# Patient Record
Sex: FEMALE | Race: BLACK OR AFRICAN AMERICAN | Employment: OTHER | URBAN - METROPOLITAN AREA
[De-identification: names, ages, dates, MRNs, and addresses within clinical notes are randomized per-mention and may not be internally consistent; named-entity substitution may affect disease eponyms.]

---

## 2023-02-03 ENCOUNTER — APPOINTMENT (OUTPATIENT)
Dept: CT IMAGING | Age: 88
DRG: 064 | End: 2023-02-03
Attending: STUDENT IN AN ORGANIZED HEALTH CARE EDUCATION/TRAINING PROGRAM
Payer: MEDICARE

## 2023-02-03 ENCOUNTER — APPOINTMENT (OUTPATIENT)
Dept: MRI IMAGING | Age: 88
DRG: 064 | End: 2023-02-03
Attending: STUDENT IN AN ORGANIZED HEALTH CARE EDUCATION/TRAINING PROGRAM
Payer: MEDICARE

## 2023-02-03 ENCOUNTER — APPOINTMENT (OUTPATIENT)
Dept: GENERAL RADIOLOGY | Age: 88
DRG: 064 | End: 2023-02-03
Attending: STUDENT IN AN ORGANIZED HEALTH CARE EDUCATION/TRAINING PROGRAM
Payer: MEDICARE

## 2023-02-03 ENCOUNTER — HOSPITAL ENCOUNTER (INPATIENT)
Age: 88
LOS: 10 days | Discharge: NURSING, REHAB OR LONG-TERM CARE | DRG: 064 | End: 2023-02-13
Attending: STUDENT IN AN ORGANIZED HEALTH CARE EDUCATION/TRAINING PROGRAM | Admitting: STUDENT IN AN ORGANIZED HEALTH CARE EDUCATION/TRAINING PROGRAM
Payer: MEDICARE

## 2023-02-03 DIAGNOSIS — I67.9 CEREBRAL VASCULAR DISEASE: ICD-10-CM

## 2023-02-03 DIAGNOSIS — R29.90 STROKE-LIKE EPISODE: ICD-10-CM

## 2023-02-03 DIAGNOSIS — G93.40 ENCEPHALOPATHY: Primary | ICD-10-CM

## 2023-02-03 LAB
ALBUMIN SERPL-MCNC: 2.7 G/DL (ref 3.5–5)
ALBUMIN/GLOB SERPL: 0.6 (ref 1.1–2.2)
ALP SERPL-CCNC: 90 U/L (ref 45–117)
ALT SERPL-CCNC: 19 U/L (ref 12–78)
AMMONIA PLAS-SCNC: 70 UMOL/L
ANION GAP BLD CALC-SCNC: 10 (ref 10–20)
ANION GAP SERPL CALC-SCNC: 4 MMOL/L (ref 5–15)
APPEARANCE UR: CLEAR
AST SERPL-CCNC: 17 U/L (ref 15–37)
ATRIAL RATE: 98 BPM
B PERT DNA SPEC QL NAA+PROBE: NOT DETECTED
BACTERIA URNS QL MICRO: NEGATIVE /HPF
BASE EXCESS BLD CALC-SCNC: 1.5 MMOL/L
BASOPHILS # BLD: 0 K/UL (ref 0–0.1)
BASOPHILS NFR BLD: 1 % (ref 0–1)
BILIRUB SERPL-MCNC: 0.3 MG/DL (ref 0.2–1)
BILIRUB UR QL: NEGATIVE
BORDETELLA PARAPERTUSSIS PCR, BORPAR: NOT DETECTED
BUN SERPL-MCNC: 26 MG/DL (ref 6–20)
BUN/CREAT SERPL: 29 (ref 12–20)
C PNEUM DNA SPEC QL NAA+PROBE: NOT DETECTED
CA-I BLD-MCNC: 1.28 MMOL/L (ref 1.12–1.32)
CALCIUM SERPL-MCNC: 9.3 MG/DL (ref 8.5–10.1)
CALCULATED R AXIS, ECG10: -167 DEGREES
CALCULATED T AXIS, ECG11: 23 DEGREES
CHLORIDE BLD-SCNC: 110 MMOL/L (ref 100–108)
CHLORIDE SERPL-SCNC: 109 MMOL/L (ref 97–108)
CO2 BLD-SCNC: 27 MMOL/L (ref 19–24)
CO2 SERPL-SCNC: 28 MMOL/L (ref 21–32)
COLOR UR: ABNORMAL
COMMENT, HOLDF: NORMAL
CREAT SERPL-MCNC: 0.91 MG/DL (ref 0.55–1.02)
CREAT UR-MCNC: 1 MG/DL (ref 0.6–1.3)
DIAGNOSIS, 93000: NORMAL
DIFFERENTIAL METHOD BLD: ABNORMAL
EOSINOPHIL # BLD: 0.1 K/UL (ref 0–0.4)
EOSINOPHIL NFR BLD: 2 % (ref 0–7)
EPITH CASTS URNS QL MICRO: ABNORMAL /LPF
ERYTHROCYTE [DISTWIDTH] IN BLOOD BY AUTOMATED COUNT: 11.6 % (ref 11.5–14.5)
FLUAV AG NPH QL IA: NEGATIVE
FLUAV SUBTYP SPEC NAA+PROBE: NOT DETECTED
FLUBV AG NOSE QL IA: NEGATIVE
FLUBV RNA SPEC QL NAA+PROBE: NOT DETECTED
GLOBULIN SER CALC-MCNC: 4.4 G/DL (ref 2–4)
GLUCOSE BLD STRIP.AUTO-MCNC: 103 MG/DL (ref 74–106)
GLUCOSE BLD STRIP.AUTO-MCNC: 128 MG/DL (ref 65–117)
GLUCOSE BLD STRIP.AUTO-MCNC: 71 MG/DL (ref 65–117)
GLUCOSE BLD STRIP.AUTO-MCNC: 79 MG/DL (ref 65–117)
GLUCOSE BLD STRIP.AUTO-MCNC: 85 MG/DL (ref 65–117)
GLUCOSE SERPL-MCNC: 100 MG/DL (ref 65–100)
GLUCOSE UR STRIP.AUTO-MCNC: NEGATIVE MG/DL
HADV DNA SPEC QL NAA+PROBE: NOT DETECTED
HCO3 BLDA-SCNC: 28 MMOL/L
HCOV 229E RNA SPEC QL NAA+PROBE: NOT DETECTED
HCOV HKU1 RNA SPEC QL NAA+PROBE: NOT DETECTED
HCOV NL63 RNA SPEC QL NAA+PROBE: NOT DETECTED
HCOV OC43 RNA SPEC QL NAA+PROBE: NOT DETECTED
HCT VFR BLD AUTO: 37.5 % (ref 35–47)
HGB BLD-MCNC: 12.8 G/DL (ref 11.5–16)
HGB UR QL STRIP: NEGATIVE
HMPV RNA SPEC QL NAA+PROBE: NOT DETECTED
HPIV1 RNA SPEC QL NAA+PROBE: NOT DETECTED
HPIV2 RNA SPEC QL NAA+PROBE: NOT DETECTED
HPIV3 RNA SPEC QL NAA+PROBE: NOT DETECTED
HPIV4 RNA SPEC QL NAA+PROBE: NOT DETECTED
HYALINE CASTS URNS QL MICRO: ABNORMAL /LPF (ref 0–2)
IMM GRANULOCYTES # BLD AUTO: 0 K/UL (ref 0–0.04)
IMM GRANULOCYTES NFR BLD AUTO: 1 % (ref 0–0.5)
INR PPP: 1.5 (ref 0.9–1.1)
KETONES UR QL STRIP.AUTO: 15 MG/DL
LACTATE BLD-SCNC: 0.84 MMOL/L (ref 0.4–2)
LEUKOCYTE ESTERASE UR QL STRIP.AUTO: NEGATIVE
LYMPHOCYTES # BLD: 0.9 K/UL (ref 0.8–3.5)
LYMPHOCYTES NFR BLD: 18 % (ref 12–49)
M PNEUMO DNA SPEC QL NAA+PROBE: NOT DETECTED
MCH RBC QN AUTO: 33.2 PG (ref 26–34)
MCHC RBC AUTO-ENTMCNC: 34.1 G/DL (ref 30–36.5)
MCV RBC AUTO: 97.2 FL (ref 80–99)
MONOCYTES # BLD: 0.5 K/UL (ref 0–1)
MONOCYTES NFR BLD: 9 % (ref 5–13)
NEUTS SEG # BLD: 3.6 K/UL (ref 1.8–8)
NEUTS SEG NFR BLD: 69 % (ref 32–75)
NITRITE UR QL STRIP.AUTO: NEGATIVE
NRBC # BLD: 0 K/UL (ref 0–0.01)
NRBC BLD-RTO: 0 PER 100 WBC
PCO2 BLDV: 47.5 MMHG (ref 41–51)
PH BLDV: 7.37 (ref 7.32–7.42)
PH UR STRIP: 5.5 (ref 5–8)
PLATELET # BLD AUTO: 449 K/UL (ref 150–400)
PMV BLD AUTO: 9.6 FL (ref 8.9–12.9)
PO2 BLDV: 23 MMHG (ref 25–40)
POTASSIUM BLD-SCNC: 3.6 MMOL/L (ref 3.5–5.5)
POTASSIUM SERPL-SCNC: 3.5 MMOL/L (ref 3.5–5.1)
PROT SERPL-MCNC: 7.1 G/DL (ref 6.4–8.2)
PROT UR STRIP-MCNC: ABNORMAL MG/DL
PROTHROMBIN TIME: 15.2 SEC (ref 9–11.1)
Q-T INTERVAL, ECG07: 386 MS
QRS DURATION, ECG06: 108 MS
QTC CALCULATION (BEZET), ECG08: 495 MS
RBC # BLD AUTO: 3.86 M/UL (ref 3.8–5.2)
RBC #/AREA URNS HPF: ABNORMAL /HPF (ref 0–5)
RSV RNA SPEC QL NAA+PROBE: NOT DETECTED
RV+EV RNA SPEC QL NAA+PROBE: DETECTED
SAMPLES BEING HELD,HOLD: NORMAL
SAO2 % BLD: 38 %
SARS-COV-2 RDRP RESP QL NAA+PROBE: NOT DETECTED
SARS-COV-2 RNA RESP QL NAA+PROBE: NOT DETECTED
SERVICE CMNT-IMP: ABNORMAL
SERVICE CMNT-IMP: NORMAL
SODIUM BLD-SCNC: 147 MMOL/L (ref 136–145)
SODIUM SERPL-SCNC: 141 MMOL/L (ref 136–145)
SOURCE, COVRS: NORMAL
SP GR UR REFRACTOMETRY: 1.01 (ref 1–1.03)
SPECIMEN SITE: ABNORMAL
TROPONIN I SERPL HS-MCNC: 62 NG/L (ref 0–51)
TROPONIN I SERPL HS-MCNC: 65 NG/L (ref 0–51)
TSH SERPL DL<=0.05 MIU/L-ACNC: 2.95 UIU/ML (ref 0.36–3.74)
UA: UC IF INDICATED,UAUC: ABNORMAL
UROBILINOGEN UR QL STRIP.AUTO: 1 EU/DL (ref 0.2–1)
VENTRICULAR RATE, ECG03: 99 BPM
WBC # BLD AUTO: 5.2 K/UL (ref 3.6–11)
WBC URNS QL MICRO: ABNORMAL /HPF (ref 0–4)

## 2023-02-03 PROCEDURE — 84484 ASSAY OF TROPONIN QUANT: CPT

## 2023-02-03 PROCEDURE — 82607 VITAMIN B-12: CPT

## 2023-02-03 PROCEDURE — 80053 COMPREHEN METABOLIC PANEL: CPT

## 2023-02-03 PROCEDURE — 70450 CT HEAD/BRAIN W/O DYE: CPT

## 2023-02-03 PROCEDURE — 74011250636 HC RX REV CODE- 250/636: Performed by: STUDENT IN AN ORGANIZED HEALTH CARE EDUCATION/TRAINING PROGRAM

## 2023-02-03 PROCEDURE — 93005 ELECTROCARDIOGRAM TRACING: CPT

## 2023-02-03 PROCEDURE — A9576 INJ PROHANCE MULTIPACK: HCPCS | Performed by: STUDENT IN AN ORGANIZED HEALTH CARE EDUCATION/TRAINING PROGRAM

## 2023-02-03 PROCEDURE — 87040 BLOOD CULTURE FOR BACTERIA: CPT

## 2023-02-03 PROCEDURE — 0202U NFCT DS 22 TRGT SARS-COV-2: CPT

## 2023-02-03 PROCEDURE — 82962 GLUCOSE BLOOD TEST: CPT

## 2023-02-03 PROCEDURE — 99223 1ST HOSP IP/OBS HIGH 75: CPT | Performed by: PSYCHIATRY & NEUROLOGY

## 2023-02-03 PROCEDURE — 85610 PROTHROMBIN TIME: CPT

## 2023-02-03 PROCEDURE — 36415 COLL VENOUS BLD VENIPUNCTURE: CPT

## 2023-02-03 PROCEDURE — 65270000046 HC RM TELEMETRY

## 2023-02-03 PROCEDURE — 85025 COMPLETE CBC W/AUTO DIFF WBC: CPT

## 2023-02-03 PROCEDURE — 0042T CT PERF W CBF: CPT

## 2023-02-03 PROCEDURE — 82140 ASSAY OF AMMONIA: CPT

## 2023-02-03 PROCEDURE — 70496 CT ANGIOGRAPHY HEAD: CPT

## 2023-02-03 PROCEDURE — 82947 ASSAY GLUCOSE BLOOD QUANT: CPT

## 2023-02-03 PROCEDURE — 71045 X-RAY EXAM CHEST 1 VIEW: CPT

## 2023-02-03 PROCEDURE — 74011000250 HC RX REV CODE- 250: Performed by: STUDENT IN AN ORGANIZED HEALTH CARE EDUCATION/TRAINING PROGRAM

## 2023-02-03 PROCEDURE — 70553 MRI BRAIN STEM W/O & W/DYE: CPT

## 2023-02-03 PROCEDURE — 81001 URINALYSIS AUTO W/SCOPE: CPT

## 2023-02-03 PROCEDURE — 87804 INFLUENZA ASSAY W/OPTIC: CPT

## 2023-02-03 PROCEDURE — 84443 ASSAY THYROID STIM HORMONE: CPT

## 2023-02-03 PROCEDURE — 74011250637 HC RX REV CODE- 250/637: Performed by: STUDENT IN AN ORGANIZED HEALTH CARE EDUCATION/TRAINING PROGRAM

## 2023-02-03 PROCEDURE — 74011000636 HC RX REV CODE- 636: Performed by: STUDENT IN AN ORGANIZED HEALTH CARE EDUCATION/TRAINING PROGRAM

## 2023-02-03 PROCEDURE — 87635 SARS-COV-2 COVID-19 AMP PRB: CPT

## 2023-02-03 PROCEDURE — 99285 EMERGENCY DEPT VISIT HI MDM: CPT

## 2023-02-03 RX ORDER — ACETAMINOPHEN 500 MG/1
500 CAPSULE, LIQUID FILLED ORAL
Status: DISCONTINUED | OUTPATIENT
Start: 2023-02-03 | End: 2023-02-03 | Stop reason: SDUPTHER

## 2023-02-03 RX ORDER — TIMOLOL MALEATE 5 MG/ML
1 SOLUTION/ DROPS OPHTHALMIC 2 TIMES DAILY
COMMUNITY

## 2023-02-03 RX ORDER — IBUPROFEN 200 MG
4 TABLET ORAL AS NEEDED
Status: DISCONTINUED | OUTPATIENT
Start: 2023-02-03 | End: 2023-02-13 | Stop reason: HOSPADM

## 2023-02-03 RX ORDER — LOSARTAN POTASSIUM 100 MG/1
100 TABLET ORAL DAILY
COMMUNITY
End: 2023-02-13

## 2023-02-03 RX ORDER — THERA TABS 400 MCG
1 TAB ORAL DAILY
Status: DISCONTINUED | OUTPATIENT
Start: 2023-02-04 | End: 2023-02-13 | Stop reason: HOSPADM

## 2023-02-03 RX ORDER — MULTIVITAMIN
1 TABLET ORAL DAILY
COMMUNITY

## 2023-02-03 RX ORDER — ACETAMINOPHEN 650 MG/1
650 SUPPOSITORY RECTAL
Status: DISCONTINUED | OUTPATIENT
Start: 2023-02-03 | End: 2023-02-13 | Stop reason: HOSPADM

## 2023-02-03 RX ORDER — ADHESIVE BANDAGE
30 BANDAGE TOPICAL DAILY PRN
Status: DISCONTINUED | OUTPATIENT
Start: 2023-02-03 | End: 2023-02-13 | Stop reason: HOSPADM

## 2023-02-03 RX ORDER — TIMOLOL MALEATE 5 MG/ML
1 SOLUTION/ DROPS OPHTHALMIC 2 TIMES DAILY
Status: DISCONTINUED | OUTPATIENT
Start: 2023-02-03 | End: 2023-02-13 | Stop reason: HOSPADM

## 2023-02-03 RX ORDER — METOPROLOL TARTRATE 25 MG/1
25 TABLET, FILM COATED ORAL 2 TIMES DAILY
COMMUNITY
End: 2023-02-13

## 2023-02-03 RX ORDER — ACETAMINOPHEN 500 MG
500 TABLET ORAL
Status: DISCONTINUED | OUTPATIENT
Start: 2023-02-03 | End: 2023-02-13 | Stop reason: HOSPADM

## 2023-02-03 RX ORDER — INSULIN LISPRO 100 [IU]/ML
INJECTION, SOLUTION INTRAVENOUS; SUBCUTANEOUS
Status: DISCONTINUED | OUTPATIENT
Start: 2023-02-03 | End: 2023-02-13 | Stop reason: HOSPADM

## 2023-02-03 RX ORDER — LATANOPROST 50 UG/ML
1 SOLUTION/ DROPS OPHTHALMIC
COMMUNITY

## 2023-02-03 RX ORDER — DEXTROMETHORPHAN HYDROBROMIDE, GUAIFENESIN 5; 100 MG/5ML; MG/5ML
650 LIQUID ORAL EVERY 8 HOURS
COMMUNITY

## 2023-02-03 RX ORDER — GUAIFENESIN 100 MG/5ML
81 LIQUID (ML) ORAL DAILY
Status: DISCONTINUED | OUTPATIENT
Start: 2023-02-04 | End: 2023-02-13 | Stop reason: HOSPADM

## 2023-02-03 RX ORDER — LOSARTAN POTASSIUM 100 MG/1
100 TABLET ORAL DAILY
Status: DISCONTINUED | OUTPATIENT
Start: 2023-02-04 | End: 2023-02-13 | Stop reason: HOSPADM

## 2023-02-03 RX ORDER — DEXTROSE MONOHYDRATE 100 MG/ML
0-250 INJECTION, SOLUTION INTRAVENOUS AS NEEDED
Status: DISCONTINUED | OUTPATIENT
Start: 2023-02-03 | End: 2023-02-13 | Stop reason: HOSPADM

## 2023-02-03 RX ORDER — AMIODARONE HYDROCHLORIDE 200 MG/1
200 TABLET ORAL 2 TIMES DAILY
Status: DISCONTINUED | OUTPATIENT
Start: 2023-02-03 | End: 2023-02-06

## 2023-02-03 RX ORDER — PHENOL/SODIUM PHENOLATE
20 AEROSOL, SPRAY (ML) MUCOUS MEMBRANE DAILY
COMMUNITY

## 2023-02-03 RX ORDER — SODIUM CHLORIDE 0.9 % (FLUSH) 0.9 %
5-10 SYRINGE (ML) INJECTION AS NEEDED
Status: DISCONTINUED | OUTPATIENT
Start: 2023-02-03 | End: 2023-02-13 | Stop reason: HOSPADM

## 2023-02-03 RX ORDER — METOPROLOL TARTRATE 25 MG/1
25 TABLET, FILM COATED ORAL 2 TIMES DAILY
Status: DISCONTINUED | OUTPATIENT
Start: 2023-02-03 | End: 2023-02-06

## 2023-02-03 RX ORDER — LEVOTHYROXINE SODIUM 75 UG/1
75 TABLET ORAL
Status: DISCONTINUED | OUTPATIENT
Start: 2023-02-04 | End: 2023-02-13 | Stop reason: HOSPADM

## 2023-02-03 RX ORDER — AMLODIPINE BESYLATE 5 MG/1
5 TABLET ORAL DAILY
COMMUNITY

## 2023-02-03 RX ORDER — GUAIFENESIN 600 MG/1
600 TABLET, EXTENDED RELEASE ORAL EVERY 12 HOURS
Status: DISCONTINUED | OUTPATIENT
Start: 2023-02-03 | End: 2023-02-13 | Stop reason: HOSPADM

## 2023-02-03 RX ORDER — BENZONATATE 100 MG/1
100 CAPSULE ORAL
Status: DISCONTINUED | OUTPATIENT
Start: 2023-02-03 | End: 2023-02-13 | Stop reason: HOSPADM

## 2023-02-03 RX ORDER — LEVOTHYROXINE SODIUM 75 UG/1
75 TABLET ORAL
COMMUNITY

## 2023-02-03 RX ORDER — ACETAMINOPHEN 325 MG/1
650 TABLET ORAL
Status: DISCONTINUED | OUTPATIENT
Start: 2023-02-03 | End: 2023-02-13 | Stop reason: HOSPADM

## 2023-02-03 RX ORDER — AMIODARONE HYDROCHLORIDE 200 MG/1
200 TABLET ORAL 2 TIMES DAILY
Status: ON HOLD | COMMUNITY
End: 2023-02-10 | Stop reason: SDUPTHER

## 2023-02-03 RX ORDER — LATANOPROST 50 UG/ML
1 SOLUTION/ DROPS OPHTHALMIC
Status: DISCONTINUED | OUTPATIENT
Start: 2023-02-03 | End: 2023-02-13 | Stop reason: HOSPADM

## 2023-02-03 RX ORDER — AMLODIPINE BESYLATE 5 MG/1
5 TABLET ORAL DAILY
Status: DISCONTINUED | OUTPATIENT
Start: 2023-02-04 | End: 2023-02-13 | Stop reason: HOSPADM

## 2023-02-03 RX ORDER — GUAIFENESIN 100 MG/5ML
81 LIQUID (ML) ORAL DAILY
COMMUNITY

## 2023-02-03 RX ORDER — PANTOPRAZOLE SODIUM 40 MG/1
40 TABLET, DELAYED RELEASE ORAL
Status: DISCONTINUED | OUTPATIENT
Start: 2023-02-04 | End: 2023-02-13 | Stop reason: HOSPADM

## 2023-02-03 RX ADMIN — GUAIFENESIN 600 MG: 600 TABLET, EXTENDED RELEASE ORAL at 22:36

## 2023-02-03 RX ADMIN — GADOTERIDOL 12 ML: 279.3 INJECTION, SOLUTION INTRAVENOUS at 19:51

## 2023-02-03 RX ADMIN — IOPAMIDOL 100 ML: 755 INJECTION, SOLUTION INTRAVENOUS at 10:59

## 2023-02-03 RX ADMIN — LATANOPROST 1 DROP: 50 SOLUTION OPHTHALMIC at 22:37

## 2023-02-03 NOTE — CONSULTS
Neurology Note    Patient ID:  Rabia Calles  [id number]  24 y.o.  6/14/1932      Date of Consultation:  February 3, 2023    Referring Physician: Dr. Adam Pearson    Reason for Consultation:  altered mental status      Assessment and Plan:    The patient is a pleasant 80-year-old female who presented to the hospital with altered mental status. Her examination does reveal a rather severe left-sided hemiparesis with cognitive slowing. Exact baseline is unclear at this time. Acute encephalopathy:    Differential includes extension of prior stroke, TIA, metabolic encephalopathy, systemic impact of infection. Patient does have risk factors for stroke including hypertension and atrial fibrillation  Continue home dosing of Xarelto and aspirin. Head CT with no acute abnormality  CTA with concern for possible ring-enhancing abnormality which may include a subacute stroke versus other structural abnormality  The patient should receive a urgent MRI with and without contrast.  Evaluate for underlying infectious etiology. COVID testing is pending  Attempt to a acquire testing results from recent hospitalization  May need to repeat echo, hemoglobin A1c and lipid panel. I will order other labs for possible causes of encephalopathy including thyroid, ammonia, and vitamin B12. Depending on clarification of mental status, the patient may need an EEG to ensure there is no seizure focus or subclinical seizures. Neurology will continue to follow closely in this complicated patient with ongoing neurological symptoms necessitating additional testing to be ordered and performed             Subjective:  I don't know       History of Present Illness:   Rabia Calles is a 80 y.o. female with a history of reportedly prior stroke, hypertension, dyslipidemia who was brought to the hospital from [address] [place] [address] and rehabilitation for reported altered mental status.   The medical history is from speaking with nursing at the bedside and medical records that are available for my review. It appears the patient had a prior stroke approximately 3 weeks ago. She was left with severe left-sided weakness and left-sided facial droop since that time. At the rehabilitation center, the patient did have a fall out of bed but reportedly did not hit her head. Since that time there has been an alteration in her mental status. She had become less responsive. She has been bedbound with dysarthria since her stroke. Prior to the stroke, she was living independently. There is no family available for me to talk to and find additional information. Pertinent labs upon admission include a white blood cell count of 5.2, platelets of 943, hemoglobin of 12.8. Her sodium was 141, INR 1.5, creatinine of 0.9. Her AST was 17 and ALT was 19. I was able to independently review the head CT from February 3, 2023. There was no acute abnormalities. There was atrophy and chronic microvascular ischemic changes. CTA of the head and neck revealed no large vessel flow-limiting stenosis. There was abnormal ringing Hanssen appearance of the right caudate and putamen which may represent subacute ischemia however other etiologies could not be excluded. Additional neuroimaging is recommended. Past Medical History:   Diagnosis Date    Atrial fibrillation ([address]     CVA (cerebral vascular accident) ([address]     GERD (gastroesophageal reflux disease)     Glaucoma     HTN (hypertension)     Hyperlipidemia         No past surgical history on file. Family History   Problem Relation Age of Onset    No Known Problems Mother     No Known Problems Father         Social History     Tobacco Use    Smoking status: Never    Smokeless tobacco: Never   Substance Use Topics    Alcohol use: Not Currently        No Known Allergies     Prior to Admission medications    Medication Sig Start Date End Date Taking?  Authorizing Provider   acetaminophen (Tylenol 8 Hour) 650 mg TbER Take 650 mg by mouth every eight (8) hours. Yes Provider, Historical   amiodarone (CORDARONE) 200 mg tablet Take 200 mg by mouth two (2) times a day. Yes Provider, Historical   aspirin 81 mg chewable tablet Take 81 mg by mouth daily. Yes Provider, Historical   latanoprost (XALATAN) 0.005 % ophthalmic solution Administer 1 Drop to both eyes nightly. Yes Provider, Historical   levothyroxine (SYNTHROID) 75 mcg tablet Take 75 mcg by mouth Daily (before breakfast). Yes Provider, Historical   losartan (COZAAR) 100 mg tablet Take 100 mg by mouth daily. Yes Provider, Historical   metoprolol tartrate (LOPRESSOR) 25 mg tablet Take 25 mg by mouth two (2) times a day. Yes Provider, Historical   multivitamin (ONE A DAY) tablet Take 1 Tablet by mouth daily. Yes Provider, Historical   amLODIPine (Norvasc) 5 mg tablet Take 5 mg by mouth daily. Yes Provider, Historical   Omeprazole delayed release (PRILOSEC D/R) 20 mg tablet Take 20 mg by mouth daily. Yes Provider, Historical   timolol (TIMOPTIC) 0.5 % ophthalmic solution 1 Drop two (2) times a day. Yes Provider, Historical   rivaroxaban (Xarelto) 20 mg tab tablet Take 20 mg by mouth every evening.    Yes Provider, Historical     Current Facility-Administered Medications   Medication Dose Route Frequency    sodium chloride (NS) flush 5-10 mL  5-10 mL IntraVENous PRN    [Held by provider] amiodarone (CORDARONE) tablet 200 mg  200 mg Oral BID    [START ON 2/4/2023] aspirin chewable tablet 81 mg  81 mg Oral DAILY    latanoprost (XALATAN) 0.005 % ophthalmic solution 1 Drop  1 Drop Both Eyes QHS    [START ON 2/4/2023] levothyroxine (SYNTHROID) tablet 75 mcg  75 mcg Oral ACB    [Held by provider] losartan (COZAAR) tablet 100 mg  100 mg Oral DAILY    [Held by provider] metoprolol tartrate (LOPRESSOR) tablet 25 mg  25 mg Oral BID    [Held by provider] amLODIPine (NORVASC) tablet 5 mg  5 mg Oral DAILY    timolol (TIMOPTIC) 0.5 % ophthalmic solution 1 Drop  1 Drop Both Eyes BID    [START ON 2/4/2023] pantoprazole (PROTONIX) tablet 40 mg  40 mg Oral ACB    [START ON 2/4/2023] rivaroxaban (XARELTO) tablet 15 mg  15 mg Oral DAILY    [START ON 2/4/2023] therapeutic multivitamin (THERAGRAN) tablet 1 Tablet  1 Tablet Oral DAILY    acetaminophen (TYLENOL) tablet 650 mg  650 mg Oral Q4H PRN    Or    acetaminophen (TYLENOL) solution 650 mg  650 mg Per NG tube Q4H PRN    Or    acetaminophen (TYLENOL) suppository 650 mg  650 mg Rectal Q4H PRN    magnesium hydroxide (MILK OF MAGNESIA) 400 mg/5 mL oral suspension 30 mL  30 mL Oral DAILY PRN    guaiFENesin ER (MUCINEX) tablet 600 mg  600 mg Oral Q12H    benzonatate (TESSALON) capsule 100 mg  100 mg Oral TID PRN    acetaminophen (TYLENOL) tablet 500 mg  500 mg Oral Q6H PRN       Review of Systems:      [x]Unable to obtain  ROS due to  [x]mental status change  []sedated   []intubated    Objective:     Visit Vitals  /73 (BP 1 Location: Right lower arm, BP Patient Position: Supine)   Pulse 70   Temp 97.9 °F (36.6 °C)   Resp 17   Ht 5' 8\" (1.727 m)   Wt 135 lb (61.2 kg)   SpO2 99%   BMI 20.53 kg/m²       Physical Exam:      General:  appears well nourished in no acute distress  Neck: no carotid bruits  Lungs: clear to auscultation  Heart:  no murmurs, regular rate  Lower extremity: peripheral pulses palpable and no edema  Skin: intact    Neurological exam:    The patient is awake and alert. She would tell me her name. She would follow simple one-step commands such as raise her right arm and wiggle her right toes. She did show me a thumb and 2 fingers. She does have decreased attention and concentration. Her speech was soft but comprehensible. Cranial nerves:   II-XII were tested    Perrrla  Fundoscopic examination attempted but difficult to visualize fundus.   Visual fields were full  Eomi, no evidence of nystagmus  Facial sensation:  normal and symmetric  Facial motor: Left facial droop  Hearing intact  SCM strength intact  Tongue: midline without fasciculations    Motor: Tone -decreased in the left upper and lower extremity    No evidence of fasciculations    Strength testing: The patient had a difficult time in comprehending motor testing. She does lift her right arm and right leg off the bed independently. She had no movement seen in her left arm or left leg. Sensory:  Upper extremity: intact to pp,  Lower extremity: intact to pp,   More detailed sensory testing was unable to be performed due to her mental status. Reflexes:    Right Left  Biceps  2 3  Triceps 2 3  Brachiorad. 2 3  Patella  2 3  Achilles 1 1    Plantar response:  extensor on the left      Cerebellar testing:  no tremor apparent  Gait: This was not assessed due to her severe weakness  Labs:     Lab Results   Component Value Date/Time    Sodium 141 02/03/2023 09:25 AM    Potassium 3.5 02/03/2023 09:25 AM    Chloride 109 (H) 02/03/2023 09:25 AM    Glucose 100 02/03/2023 09:25 AM    BUN 26 (H) 02/03/2023 09:25 AM    Creatinine 0.91 02/03/2023 09:25 AM    Calcium 9.3 02/03/2023 09:25 AM    WBC 5.2 02/03/2023 09:25 AM    HCT 37.5 02/03/2023 09:25 AM    HGB 12.8 02/03/2023 09:25 AM    PLATELET 869 (H) 28/78/8818 09:25 AM       Imaging:    Results from Hospital Encounter encounter on 12/17/10    MRI SPINE CERVICAL WITHOUT CONTRAST    Narrative      ICD Codes / Adm. Diagnosis: 723.1   / Lendel Mood  Examination:  MRI C SPINE  CON  - [id number] - Dec 17 2010 12:33PM  Accession No:  [id number]  Reason:  cervicalgia      REPORT:  INDICATION: Cervicalgia    Cervical spine MRI examination consists of sagittal STIR and T2 and T1 and  axial gradient-echo and T1-weighted sequences. STIR images demonstrate no pathologic high signal intensity foci in cervical  spine bone marrow. The cord is of normal size and signal intensity. There is no tonsillar  herniation. There is degenerative disc disease fairly markedly at C4-C5 and mild to  moderately at C3-C4.     There is bilateral Luschka joint spurring at C3-C4 and transverse disc  protrusion and grade 1 spondylolisthesis of C3 on C4 with mild to moderate  ventral thecal sac indentation at that level and mild to moderate left and  mild right foraminal compromise. There is bilateral Luschka joint spurring and a central disc extrusion at  C4-C5 with moderate ventral central thecal sac indentation and mild  bilateral foraminal compromise. There is transverse disc protrusion at C5-C6 with mild ventral thecal sac  indentation. IMPRESSION:  1. Spondylosis C3-C6. Interpreting/Reading Doctor: Charles Cabello ([id number])  Transcribed:  on 12/17/2010  Approved: Charles Cabello ([id number])  12/17/2010        Distribution:  Attending Doctor: Francisco Peterson. Kim Scheuermann      Results from Hospital Encounter encounter on 02/03/23    CT PERF W CBF    Narrative  EXAM:  CTA HEAD NECK W CONT, CT PERF W CBF    INDICATION:   ams    COMPARISON:  None. CONTRAST: 100 mL of Isovue-370    TECHNIQUE: Unenhanced  images were obtained to localize the volume for  acquisition. Multislice helical axial CT angiography was performed from the  aortic arch to the top of the head during uneventful rapid bolus intravenous  contrast administration. Coronal and sagittal reformations and 3D post  processing was performed. CT dose reduction was achieved through use of a  standardized protocol tailored for this examination and automatic exposure  control for dose modulation. FINDINGS:    DELAYED CONTRAST-ENHANCED HEAD CT:    The ventricles are slightly prominent, possibly related to ex vacuo dilatation  from generalized volume loss. There is no midline shift or mass effect. Relative  low density with ring enhancement of the right caudate and putamen with  surrounding increased low-density in the white matter. No extra-axial  collections. Gray-white differentiation is maintained. The basal cisterns are  clear.  The orbits, paranasal sinuses, and mastoid air cells are unremarkable. CTA NECK:    Great vessels: The aortic arch and great vessel origins are not completely  visualized. Right subclavian artery: Patent    Left subclavian artery: Patent    Right common carotid artery: Patent    Left common carotid artery: Patent    Cervical right internal carotid artery: Patent with no significant stenosis by  NASCET criteria. Cervical left internal carotid artery: Patent with no significant stenosis by  NASCET criteria. Right vertebral artery: Patent    Left vertebral artery: Patent    The lung apices are clear. The cervical soft tissues are unremarkable. No  suspicious osseous lesions. CTA HEAD:    Right cavernous internal carotid artery: Patent    Left cavernous internal carotid artery: Patent    Anterior cerebral arteries: Patent    Anterior communicating artery: Patent    Right middle cerebral artery: Patent    Left middle cerebral artery: Patent    Posterior communicating arteries: Patent    Posterior cerebral arteries: Patent    Basilar artery: Patent    Distal vertebral arteries: Patent    There is no intracranial aneurysm. The dural venous sinuses are patent. Impression  1. Abnormal ring-enhancing appearance of the right caudate and putamen,  possibly representing subacute ischemia though other etiologies are not  excluded. Further evaluation with contrast-enhanced MRI recommended. 2.  No evidence of large vessel occlusion or significant flow-limiting stenosis. 3.  Mild prominence of the ventricular system may be related to ex vacuo  dilatation. Superimposed communicating hydrocephalus can be considered. Complex decision making was necessary due to the patient's current medical condition and other medical co-morbidities.          Active Problems:    Stroke-like episode (2/3/2023)                   Signed By:  Nagi Morrison DO FAAN    February 3, 2023

## 2023-02-03 NOTE — Clinical Note
Status[de-identified] INPATIENT [101]   Type of Bed: Neuro/Stroke [9]   Cardiac Monitoring Required?: Yes   Inpatient Hospitalization Certified Necessary for the Following Reasons: 3.  Patient receiving treatment that can only be provided in an inpatient setting (further clarification in H&P documentation)   Admitting Diagnosis: Stroke-like episode [[id number]]   Admitting Physician: Alonzo Henriquez [[id number]]   Attending Physician: Alonzo Henriquez [[id number]]   Estimated Length of Stay: 3-4 Midnights   Discharge Plan[de-identified] Extended Care Facility (e.g. Adult Home, Nursing Home, etc.)

## 2023-02-03 NOTE — ED NOTES
Pt present to ER from [facility] via EMS w/ complaints of AMS. Facility staff found pt this morning and she was only responsive to pain. Assisted to position of comfort, call bell within reach.

## 2023-02-03 NOTE — PROGRESS NOTES

## 2023-02-03 NOTE — PROGRESS NOTES
Report given to [facility] according to report received from [employer] in ED using SBAR. Patient still needs MRI checklist. Attempted to obtain MRI Check list prior to patient arriving on unit but no one answered the contact numbers on chart and there was no voice mailbox. Attempted to call [facility] care x3 to obtain information but each time was put on hold and then cut off.

## 2023-02-03 NOTE — PROGRESS NOTES
Speech path  Patient is currently being evaluated by the neurologist and the nurse. She just arrived on the floor. No completed H and P yet. We will follow up tomorrow. Please complete [place] swallow screen.    Brii Manuel, SLP

## 2023-02-03 NOTE — H&P
Hospitalist Admission Note    NAME: Ruba Castro   :  1932   MRN:  [id number]     Date/Time:  2/3/2023 2:09 PM    Patient PCP: None  ______________________________________________________________________  Given the patient's current clinical presentation, I have a high level of concern for decompensation if discharged from the emergency department. Complex decision making was performed, which includes reviewing the patient's available past medical records, laboratory results, and x-ray films. My assessment of this patient's clinical condition and my plan of care is as follows. Assessment / Plan:  AMS-Encephalopathy  R/o stroke/TIA  Episode of AMS and unresponsive except to painful stimuli at SNF post CVA discharge approximately 3 weeks ago. CT with subacute stroke/ring enhancing lesion  MRI with previously identified ring enhancing lesion  UA okay  Plan  Admit to neuro  Cardiac monitoring  Stroke order set used  Neurology consulted, expertise appreciated  AM HbA1C and lipid panel  MRI brain  Permissive HTN-hold home antihypertensives  PRNs for BP >220/110  Continue home xarelto and ASA  Stoke nursing assessments  PT/OT/SLP    Cough  Flu and covid negative. CXR with no acute process. Deferred blood cultures as pt not demonstrating SIRS criteria. Acute respiratory viral infection with recent stroke contributing to AMS. PCR added with viral panel as patient currently living in healthcare group setting. Positive for rhinovirus and enterovirus. Mucinex    HTN  Afib  Hold amiodarone, norvasc, losartan, and metoprolol  Continue xarelto    DM  SSI  Follow up AM HbA1C    Glaucoma  Continue home eye drops    GERD  Continue home PPI    Hypothyroidism  Continue home synthroid    Code Status: Full  Surrogate Decision Maker: Damien Canales [phone]    DVT Prophylaxis: Lovenox  GI Prophylaxis: not indicated    Baseline: New baseline of bed bound with left sided hemiplegia  JACKY: ? Subjective:   CHIEF COMPLAINT: AMS    HISTORY OF PRESENT ILLNESS:     Rabia Calles is a 80 y.o.  female who presents with PMH of HTN, afib, CVA, DM, glaucoma, hypothyroidism, and GERD. Patient poor historian, alert and oriented to self and very nearly date with month, day, and \"2003\". Unaware of location. History collected from chart and son Gee Richards. Patient presenting from [facility] after episode of AMS this morning. Reportedly she fell from bed level yesterday and did not hit her head. Yesterday post fall she was fine at new baseline of bed bound with some dysarthria, however able to talk. This morning staff noted that she was altered by finding that she only responded to pain. Per son when he presented to ED, patient was back to her new baseline post CVA. Prior to stroke 3 weeks ago, she was living independently without a assisted walking device. She was driving and managing her ADLs independently with some short term memory difficulties. Since 1/2023 CVA she has been in rehab with left sided hemiplegia and left sided facial droop. Pt is noted to have some congestion and cough during interview. Rapid COVID and flu test negative. ED Course  Afebrile, HR 59 /68 RR 15 SpO2 98% RA  Labs remarkable for Plt of 449, INR of 1.5, Cl 109, BUN 26, Albumin 2.7. HS troponin 62>65  Rapid COVID and flu negative. Blood culture and UA collected. EKG with afib  CT head/CTA with 1. Abnormal ring-enhancing appearance of the right caudate and putamen, possibly representing subacute ischemia though other etiologies are not excluded. Further evaluation with contrast-enhanced MRI recommended. 2.  No evidence of large vessel occlusion or significant flow-limiting stenosis. 3.  Mild prominence of the ventricular system may be related to ex vacuo  dilatation. Superimposed communicating hydrocephalus can be considered.     We were asked to admit for work up and evaluation of the above problems. Past Medical History:   Diagnosis Date    Atrial fibrillation ([facility] [address]     CVA (cerebral vascular accident) ([facility] [address]     GERD (gastroesophageal reflux disease)     Glaucoma     HTN (hypertension)     Hyperlipidemia         No past surgical history on file. Social History     Tobacco Use    Smoking status: Never    Smokeless tobacco: Never   Substance Use Topics    Alcohol use: Not Currently        Family History   Problem Relation Age of Onset    No Known Problems Mother     No Known Problems Father      No Known Allergies     Prior to Admission medications    Medication Sig Start Date End Date Taking? Authorizing Provider   acetaminophen (Tylenol 8 Hour) 650 mg TbER Take 650 mg by mouth every eight (8) hours. Yes Provider, Historical   amiodarone (CORDARONE) 200 mg tablet Take 200 mg by mouth two (2) times a day. Yes Provider, Historical   aspirin 81 mg chewable tablet Take 81 mg by mouth daily. Yes Provider, Historical   latanoprost (XALATAN) 0.005 % ophthalmic solution Administer 1 Drop to both eyes nightly. Yes Provider, Historical   levothyroxine (SYNTHROID) 75 mcg tablet Take 75 mcg by mouth Daily (before breakfast). Yes Provider, Historical   losartan (COZAAR) 100 mg tablet Take 100 mg by mouth daily. Yes Provider, Historical   metoprolol tartrate (LOPRESSOR) 25 mg tablet Take 25 mg by mouth two (2) times a day. Yes Provider, Historical   multivitamin (ONE A DAY) tablet Take 1 Tablet by mouth daily. Yes Provider, Historical   amLODIPine (Norvasc) 5 mg tablet Take 5 mg by mouth daily. Yes Provider, Historical   Omeprazole delayed release (PRILOSEC D/R) 20 mg tablet Take 20 mg by mouth daily. Yes Provider, Historical   timolol (TIMOPTIC) 0.5 % ophthalmic solution 1 Drop two (2) times a day. Yes Provider, Historical   rivaroxaban (Xarelto) 20 mg tab tablet Take 20 mg by mouth every evening.    Yes Provider, Historical       REVIEW OF SYSTEMS:     I am not able to complete the review of systems because: The patient is intubated and sedated    The patient has altered mental status due to acute medical problems    The patient has baseline aphasia from prior stroke(s)   x The patient has baseline dementia and is not reliable historian    The patient is in acute medical distress and unable to provide information           Total of 12 systems reviewed as follows:       POSITIVE= underlined text  Negative = text not underlined  General:  fever, chills, sweats, generalized weakness, weight loss/gain,      loss of appetite   Eyes:    blurred vision, eye pain, loss of vision, double vision  ENT:    rhinorrhea, pharyngitis   Respiratory:   cough, sputum production, SOB, MARTIN, wheezing, pleuritic pain   Cardiology:   chest pain, palpitations, orthopnea, PND, edema, syncope   Gastrointestinal:  abdominal pain , N/V, diarrhea, dysphagia, constipation, bleeding   Genitourinary:  frequency, urgency, dysuria, hematuria, incontinence   Muskuloskeletal :  arthralgia, myalgia, back pain  Hematology:  easy bruising, nose or gum bleeding, lymphadenopathy   Dermatological: rash, ulceration, pruritis, color change / jaundice  Endocrine:   hot flashes or polydipsia   Neurological:  headache, dizziness, confusion, focal weakness, paresthesia,     Speech difficulties, memory loss, gait difficulty  Psychological: Feelings of anxiety, depression, agitation    Objective:   VITALS:    Visit Vitals  /80   Pulse 70   Temp 98.7 °F (37.1 °C)   Resp 17   Ht 5' 8\" (1.727 m)   Wt 61.2 kg (135 lb)   SpO2 99%   BMI 20.53 kg/m²       PHYSICAL EXAM:    General:    Alert, cooperative, no distress, appears stated age. HEENT: Atraumatic, anicteric sclerae, pink conjunctivae     No oral ulcers, mucosa moist, throat clear, dentition fair  Neck:  Supple, symmetrical,  thyroid: non tender  Lungs:   Clear to auscultation bilaterally. No Wheezing or Rhonchi. No rales.   Chest wall:  No tenderness  No Accessory muscle use.  Heart:   Regular  rhythm,  No  murmur   No edema  Abdomen:   Soft, non-tender. Not distended. Bowel sounds normal  Extremities: No cyanosis. No clubbing,      Skin turgor normal, Capillary refill normal, Radial dial pulse 2+  Skin:     Not pale. Not Jaundiced  No rashes   Psych:  Good insight. Not depressed. Not anxious or agitated. Neurologic: EOMs intact. Left sided facial droop/asymmetry. No aphasia, some delayed speech. Alert and oriented x2. Left side UE and LE with 0/5 strength. No palpable muscular contractions except for 1/5 from LLE dorsiflexion. _______________________________________________________________________  Care Plan discussed with:    Comments   Patient x    Family      RN     Care Manager                    Consultant:      _______________________________________________________________________  Expected  Disposition:   Home with Family    HH/PT/OT/RN    SNF/LTC x   MALLORIE    ________________________________________________________________________  TOTAL TIME:  72 Minutes    Critical Care Provided     Minutes non procedure based      Comments    x Reviewed previous records   >50% of visit spent in counseling and coordination of care x Discussion with patient and/or family and questions answered       ________________________________________________________________________  Signed: Macrina Martínez MD    Procedures: see electronic medical records for all procedures/Xrays and details which were not copied into this note but were reviewed prior to creation of Plan.     LAB DATA REVIEWED:    Recent Results (from the past 24 hour(s))   GLUCOSE, POC    Collection Time: 02/03/23  9:07 AM   Result Value Ref Range    Glucose (POC) 128 (H) 65 - 117 mg/dL    Performed by Arian Springer (KEVIN RN)    BLOOD GAS,CHEM8,LACTIC ACID POC    Collection Time: 02/03/23  9:14 AM   Result Value Ref Range    pH, venous (POC) 7.37 7.32 - 7.42      pCO2, venous (POC) 47.5 41 - 51 MMHG    pO2, venous (POC) 23 (L) 25 - 40 mmHg    BICARBONATE 28 mmol/L    Base excess (POC) 1.5 mmol/L    O2 SAT 38 %    Sodium,  (H) 136 - 145 MMOL/L    Potassium, POC 3.6 3.5 - 5.5 MMOL/L    Chloride,  (H) 100 - 108 MMOL/L    CO2, POC 27 (H) 19 - 24 MMOL/L    Anion gap, POC 10 10 - 20      Glucose,  74 - 106 MG/DL    Creatinine, POC 1.0 0.6 - 1.3 MG/DL    eGFR (POC) 54 (L) >60 ml/min/1.73m2    Calcium, ionized (POC) 1.28 1.12 - 1.32 mmol/L    Lactic Acid (POC) 0.84 0.40 - 2.00 mmol/L    Sample source VENOUS BLOOD     EKG, 12 LEAD, INITIAL    Collection Time: 02/03/23  9:14 AM   Result Value Ref Range    Ventricular Rate 99 BPM    Atrial Rate 98 BPM    QRS Duration 108 ms    Q-T Interval 386 ms    QTC Calculation (Bezet) 495 ms    Calculated R Axis -167 degrees    Calculated T Axis 23 degrees    Diagnosis       ** Poor data quality, interpretation may be adversely affected  ** Suspect arm lead reversal, interpretation assumes no reversal  Atrial fibrillation  Minimal voltage criteria for LVH, may be normal variant  Confirmed by Chrissie Fernández ([id number]) on 2/3/2023 11:29:40 AM     CBC WITH AUTOMATED DIFF    Collection Time: 02/03/23  9:25 AM   Result Value Ref Range    WBC 5.2 3.6 - 11.0 K/uL    RBC 3.86 3.80 - 5.20 M/uL    HGB 12.8 11.5 - 16.0 g/dL    HCT 37.5 35.0 - 47.0 %    MCV 97.2 80.0 - 99.0 FL    MCH 33.2 26.0 - 34.0 PG    MCHC 34.1 30.0 - 36.5 g/dL    RDW 11.6 11.5 - 14.5 %    PLATELET 006 (H) 121 - 400 K/uL    MPV 9.6 8.9 - 12.9 FL    NRBC 0.0 0  WBC    ABSOLUTE NRBC 0.00 0.00 - 0.01 K/uL    NEUTROPHILS 69 32 - 75 %    LYMPHOCYTES 18 12 - 49 %    MONOCYTES 9 5 - 13 %    EOSINOPHILS 2 0 - 7 %    BASOPHILS 1 0 - 1 %    IMMATURE GRANULOCYTES 1 (H) 0.0 - 0.5 %    ABS. NEUTROPHILS 3.6 1.8 - 8.0 K/UL    ABS. LYMPHOCYTES 0.9 0.8 - 3.5 K/UL    ABS. MONOCYTES 0.5 0.0 - 1.0 K/UL    ABS. EOSINOPHILS 0.1 0.0 - 0.4 K/UL    ABS. BASOPHILS 0.0 0.0 - 0.1 K/UL    ABS. IMM.  GRANS. 0.0 0.00 - 0.04 K/UL    DF AUTOMATED METABOLIC PANEL, COMPREHENSIVE    Collection Time: 02/03/23  9:25 AM   Result Value Ref Range    Sodium 141 136 - 145 mmol/L    Potassium 3.5 3.5 - 5.1 mmol/L    Chloride 109 (H) 97 - 108 mmol/L    CO2 28 21 - 32 mmol/L    Anion gap 4 (L) 5 - 15 mmol/L    Glucose 100 65 - 100 mg/dL    BUN 26 (H) 6 - 20 MG/DL    Creatinine 0.91 0.55 - 1.02 MG/DL    BUN/Creatinine ratio 29 (H) 12 - 20      eGFR 60 (L) >60 ml/min/1.73m2    Calcium 9.3 8.5 - 10.1 MG/DL    Bilirubin, total 0.3 0.2 - 1.0 MG/DL    ALT (SGPT) 19 12 - 78 U/L    AST (SGOT) 17 15 - 37 U/L    Alk. phosphatase 90 45 - 117 U/L    Protein, total 7.1 6.4 - 8.2 g/dL    Albumin 2.7 (L) 3.5 - 5.0 g/dL    Globulin 4.4 (H) 2.0 - 4.0 g/dL    A-G Ratio 0.6 (L) 1.1 - 2.2     SAMPLES BEING HELD    Collection Time: 02/03/23  9:25 AM   Result Value Ref Range    SAMPLES BEING HELD DRKGRN     COMMENT        Add-on orders for these samples will be processed based on acceptable specimen integrity and analyte stability, which may vary by analyte.    PROTHROMBIN TIME + INR    Collection Time: 02/03/23 10:01 AM   Result Value Ref Range    INR 1.5 (H) 0.9 - 1.1      Prothrombin time 15.2 (H) 9.0 - 11.1 sec   TROPONIN-HIGH SENSITIVITY    Collection Time: 02/03/23 10:01 AM   Result Value Ref Range    Troponin-High Sensitivity 62 (H) 0 - 51 ng/L   COVID-19 RAPID TEST    Collection Time: 02/03/23 10:01 AM   Result Value Ref Range    Specimen source Nasopharyngeal      COVID-19 rapid test Not detected NOTD     INFLUENZA A+B VIRAL AGS    Collection Time: 02/03/23 10:01 AM   Result Value Ref Range    Influenza A Antigen Negative NEG      Influenza B Antigen Negative NEG     TROPONIN-HIGH SENSITIVITY    Collection Time: 02/03/23 12:37 PM   Result Value Ref Range    Troponin-High Sensitivity 65 (H) 0 - 51 ng/L

## 2023-02-03 NOTE — PROGRESS NOTES
Pharmacy Automatic Direct Oral Anticoagulant Renal Dosing Protocol    Patient currently receiving Rivaroxaban 20 mg daily with an indication of ? Start date: PTA med      Labs:  Recent Labs     02/03/23  0925   CREA 0.91   HGB 12.8   *     Wt Readings from Last 1 Encounters:   02/03/23 61.2 kg (135 lb)        Creatinine Clearance: 39.7 (Actual Body Weight)    Impression/Plan:   Reduced dose to  Rivaroxaban 15mg/day while Est Crcl 15-50 ml/min per dosing protocol. Pharmacy will follow daily and adjust as appropriate.     Thank you,  Sy Tapia, [facility]

## 2023-02-03 NOTE — ED PROVIDER NOTES
[facility] [address] [facility]       Pt Name: Yulia Hays  MRN: [id number]  [place] 6/14/1932  Date of evaluation: 2/3/2023  Provider: Cesar Aguilar DO   PCP: None  Note Started: 5:20 PM 2/3/23     CHIEF COMPLAINT       Chief Complaint   Patient presents with    Unresponsive     AMS        HISTORY OF PRESENT ILLNESS: 1 or more elements      History From: EMS and Patient's Son  HPI Limitations : Altered Mental Status and Dementia     Gisell Colin is a 80 y.o. female who presents to the emergency department with chief complaint of altered mental status starting this morning. Patient's last known well was sometime last evening when she went to bed. There are no alternative caregivers at bedside and EMS is unclear of history. Per EMS, patient began not acting normally when she was evaluated this morning. They report that she is normally verbal but was nonverbal.  She does have a cough. Nursing facility denies any fever. History is limited due to patient's altered mental status and lack of caregivers at bedside. Nursing Notes were all reviewed and agreed with or any disagreements were addressed in the HPI. REVIEW OF SYSTEMS      Review of Systems   Unable to perform ROS: Mental status change      Positives and Pertinent negatives as per HPI. PAST HISTORY     Past Medical History:  Past Medical History:   Diagnosis Date    Atrial fibrillation ([address]     CVA (cerebral vascular accident) ([address]     GERD (gastroesophageal reflux disease)     Glaucoma     HTN (hypertension)     Hyperlipidemia        Past Surgical History:  No past surgical history on file.     Family History:  Family History   Problem Relation Age of Onset    No Known Problems Mother     No Known Problems Father        Social History:  Social History     Tobacco Use    Smoking status: Never    Smokeless tobacco: Never   Substance Use Topics    Alcohol use: Not Currently    Drug use: Never Allergies:  No Known Allergies    CURRENT MEDICATIONS      Current Discharge Medication List        CONTINUE these medications which have NOT CHANGED    Details   acetaminophen (Tylenol 8 Hour) 650 mg TbER Take 650 mg by mouth every eight (8) hours. amiodarone (CORDARONE) 200 mg tablet Take 200 mg by mouth two (2) times a day. aspirin 81 mg chewable tablet Take 81 mg by mouth daily. latanoprost (XALATAN) 0.005 % ophthalmic solution Administer 1 Drop to both eyes nightly. levothyroxine (SYNTHROID) 75 mcg tablet Take 75 mcg by mouth Daily (before breakfast). losartan (COZAAR) 100 mg tablet Take 100 mg by mouth daily. metoprolol tartrate (LOPRESSOR) 25 mg tablet Take 25 mg by mouth two (2) times a day. multivitamin (ONE A DAY) tablet Take 1 Tablet by mouth daily. amLODIPine (Norvasc) 5 mg tablet Take 5 mg by mouth daily. Omeprazole delayed release (PRILOSEC D/R) 20 mg tablet Take 20 mg by mouth daily. timolol (TIMOPTIC) 0.5 % ophthalmic solution 1 Drop two (2) times a day. rivaroxaban (Xarelto) 20 mg tab tablet Take 20 mg by mouth every evening. SCREENINGS               No data recorded         PHYSICAL EXAM      ED Triage Vitals [02/03/23 0908]   ED Encounter Vitals Group      /68      Pulse (Heart Rate) (!) 59      Resp Rate 15      Temp 98.7 °F (37.1 °C)      Temp src       O2 Sat (%) 98 %      Weight 135 lb      Height 5' 8\"        Physical Exam  Vitals and nursing note reviewed. Constitutional:       Appearance: Normal appearance. HENT:      Head: Normocephalic and atraumatic. Mouth/Throat:      Mouth: Mucous membranes are moist.   Eyes:      Conjunctiva/sclera: Conjunctivae normal.   Cardiovascular:      Rate and Rhythm: Tachycardia present. Rhythm irregular. Pulmonary:      Effort: Pulmonary effort is normal.      Breath sounds: Normal breath sounds. Abdominal:      General: Abdomen is flat.       Palpations: Abdomen is soft.   Musculoskeletal:      Right lower leg: No edema. Left lower leg: No edema. Skin:     General: Skin is warm. Capillary Refill: Capillary refill takes less than 2 seconds. Neurological:      Mental Status: She is alert. Comments: Opens eyes to painful stimuli, when asked if something hurts, patient reported her head. She is moving all extremities to painful stimuli, does appear weaker on the left arm and the left lower extremity.   She does appear to have a mild facial droop on the left side          DIAGNOSTIC RESULTS   LABS:     Recent Results (from the past 12 hour(s))   GLUCOSE, POC    Collection Time: 02/03/23  9:07 AM   Result Value Ref Range    Glucose (POC) 128 (H) 65 - 117 mg/dL    Performed by Jase Cole (KEIVN RN)    BLOOD GAS,CHEM8,LACTIC ACID POC    Collection Time: 02/03/23  9:14 AM   Result Value Ref Range    pH, venous (POC) 7.37 7.32 - 7.42      pCO2, venous (POC) 47.5 41 - 51 MMHG    pO2, venous (POC) 23 (L) 25 - 40 mmHg    BICARBONATE 28 mmol/L    Base excess (POC) 1.5 mmol/L    O2 SAT 38 %    Sodium,  (H) 136 - 145 MMOL/L    Potassium, POC 3.6 3.5 - 5.5 MMOL/L    Chloride,  (H) 100 - 108 MMOL/L    CO2, POC 27 (H) 19 - 24 MMOL/L    Anion gap, POC 10 10 - 20      Glucose,  74 - 106 MG/DL    Creatinine, POC 1.0 0.6 - 1.3 MG/DL    eGFR (POC) 54 (L) >60 ml/min/1.73m2    Calcium, ionized (POC) 1.28 1.12 - 1.32 mmol/L    Lactic Acid (POC) 0.84 0.40 - 2.00 mmol/L    Sample source VENOUS BLOOD     EKG, 12 LEAD, INITIAL    Collection Time: 02/03/23  9:14 AM   Result Value Ref Range    Ventricular Rate 99 BPM    Atrial Rate 98 BPM    QRS Duration 108 ms    Q-T Interval 386 ms    QTC Calculation (Bezet) 495 ms    Calculated R Axis -167 degrees    Calculated T Axis 23 degrees    Diagnosis       ** Poor data quality, interpretation may be adversely affected  ** Suspect arm lead reversal, interpretation assumes no reversal  Atrial fibrillation  Minimal voltage criteria for LVH, may be normal variant  Confirmed by Saintclair Danish ([id number]) on 2/3/2023 11:29:40 AM     CBC WITH AUTOMATED DIFF    Collection Time: 02/03/23  9:25 AM   Result Value Ref Range    WBC 5.2 3.6 - 11.0 K/uL    RBC 3.86 3.80 - 5.20 M/uL    HGB 12.8 11.5 - 16.0 g/dL    HCT 37.5 35.0 - 47.0 %    MCV 97.2 80.0 - 99.0 FL    MCH 33.2 26.0 - 34.0 PG    MCHC 34.1 30.0 - 36.5 g/dL    RDW 11.6 11.5 - 14.5 %    PLATELET 690 (H) 189 - 400 K/uL    MPV 9.6 8.9 - 12.9 FL    NRBC 0.0 0  WBC    ABSOLUTE NRBC 0.00 0.00 - 0.01 K/uL    NEUTROPHILS 69 32 - 75 %    LYMPHOCYTES 18 12 - 49 %    MONOCYTES 9 5 - 13 %    EOSINOPHILS 2 0 - 7 %    BASOPHILS 1 0 - 1 %    IMMATURE GRANULOCYTES 1 (H) 0.0 - 0.5 %    ABS. NEUTROPHILS 3.6 1.8 - 8.0 K/UL    ABS. LYMPHOCYTES 0.9 0.8 - 3.5 K/UL    ABS. MONOCYTES 0.5 0.0 - 1.0 K/UL    ABS. EOSINOPHILS 0.1 0.0 - 0.4 K/UL    ABS. BASOPHILS 0.0 0.0 - 0.1 K/UL    ABS. IMM. GRANS. 0.0 0.00 - 0.04 K/UL    DF AUTOMATED     METABOLIC PANEL, COMPREHENSIVE    Collection Time: 02/03/23  9:25 AM   Result Value Ref Range    Sodium 141 136 - 145 mmol/L    Potassium 3.5 3.5 - 5.1 mmol/L    Chloride 109 (H) 97 - 108 mmol/L    CO2 28 21 - 32 mmol/L    Anion gap 4 (L) 5 - 15 mmol/L    Glucose 100 65 - 100 mg/dL    BUN 26 (H) 6 - 20 MG/DL    Creatinine 0.91 0.55 - 1.02 MG/DL    BUN/Creatinine ratio 29 (H) 12 - 20      eGFR 60 (L) >60 ml/min/1.73m2    Calcium 9.3 8.5 - 10.1 MG/DL    Bilirubin, total 0.3 0.2 - 1.0 MG/DL    ALT (SGPT) 19 12 - 78 U/L    AST (SGOT) 17 15 - 37 U/L    Alk.  phosphatase 90 45 - 117 U/L    Protein, total 7.1 6.4 - 8.2 g/dL    Albumin 2.7 (L) 3.5 - 5.0 g/dL    Globulin 4.4 (H) 2.0 - 4.0 g/dL    A-G Ratio 0.6 (L) 1.1 - 2.2     SAMPLES BEING HELD    Collection Time: 02/03/23  9:25 AM   Result Value Ref Range    SAMPLES BEING HELD DRKGRN     COMMENT        Add-on orders for these samples will be processed based on acceptable specimen integrity and analyte stability, which may vary by analyte. PROTHROMBIN TIME + INR    Collection Time: 02/03/23 10:01 AM   Result Value Ref Range    INR 1.5 (H) 0.9 - 1.1      Prothrombin time 15.2 (H) 9.0 - 11.1 sec   TROPONIN-HIGH SENSITIVITY    Collection Time: 02/03/23 10:01 AM   Result Value Ref Range    Troponin-High Sensitivity 62 (H) 0 - 51 ng/L   COVID-19 RAPID TEST    Collection Time: 02/03/23 10:01 AM   Result Value Ref Range    Specimen source Nasopharyngeal      COVID-19 rapid test Not detected NOTD     INFLUENZA A+B VIRAL AGS    Collection Time: 02/03/23 10:01 AM   Result Value Ref Range    Influenza A Antigen Negative NEG      Influenza B Antigen Negative NEG     TROPONIN-HIGH SENSITIVITY    Collection Time: 02/03/23 12:37 PM   Result Value Ref Range    Troponin-High Sensitivity 65 (H) 0 - 51 ng/L   URINALYSIS W/ REFLEX CULTURE    Collection Time: 02/03/23  2:54 PM    Specimen: Urine   Result Value Ref Range    Color YELLOW/STRAW      Appearance CLEAR CLEAR      Specific gravity 1.010 1.003 - 1.030      pH (UA) 5.5 5.0 - 8.0      Protein TRACE (A) NEG mg/dL    Glucose Negative NEG mg/dL    Ketone 15 (A) NEG mg/dL    Bilirubin Negative NEG      Blood Negative NEG      Urobilinogen 1.0 0.2 - 1.0 EU/dL    Nitrites Negative NEG      Leukocyte Esterase Negative NEG      UA:UC IF INDICATED CULTURE NOT INDICATED BY UA RESULT CNI      WBC 0-4 0 - 4 /hpf    RBC 0-5 0 - 5 /hpf    Epithelial cells MODERATE (A) FEW /lpf    Bacteria Negative NEG /hpf    Hyaline cast 0-2 0 - 2 /lpf        EKG interpreted by me: A-fib     RADIOLOGY:  Non-plain film images such as CT, Ultrasound and MRI are read by the radiologist. Plain radiographic images are visualized and preliminarily interpreted by the ED Provider with the below findings:       Interpretation per the Radiologist below, if available at the time of this note:     CT HEAD WO CONT    Result Date: 2/3/2023  EXAM: CT HEAD WO CONT INDICATION: ams COMPARISON: None. CONTRAST: None.  TECHNIQUE: Unenhanced CT of the head was performed using 5 mm images. Brain and bone windows were generated. Coronal and sagittal reformats. CT dose reduction was achieved through use of a standardized protocol tailored for this examination and automatic exposure control for dose modulation. FINDINGS: Atrophy and white matter disease. No extra-axial fluid collection hemorrhage or shift. No mass. No acute changes. CT PERF W CBF    Result Date: 2/3/2023  EXAM:  CTA HEAD NECK W CONT, CT PERF W CBF INDICATION:   ams COMPARISON:  None. CONTRAST: 100 mL of Isovue-370 TECHNIQUE: Unenhanced  images were obtained to localize the volume for acquisition. Multislice helical axial CT angiography was performed from the aortic arch to the top of the head during uneventful rapid bolus intravenous contrast administration. Coronal and sagittal reformations and 3D post processing was performed. CT dose reduction was achieved through use of a standardized protocol tailored for this examination and automatic exposure control for dose modulation. FINDINGS: DELAYED CONTRAST-ENHANCED HEAD CT: The ventricles are slightly prominent, possibly related to ex vacuo dilatation from generalized volume loss. There is no midline shift or mass effect. Relative low density with ring enhancement of the right caudate and putamen with surrounding increased low-density in the white matter. No extra-axial collections. Gray-white differentiation is maintained. The basal cisterns are clear. The orbits, paranasal sinuses, and mastoid air cells are unremarkable. CTA NECK: Great vessels: The aortic arch and great vessel origins are not completely visualized. Right subclavian artery: Patent Left subclavian artery: Patent Right common carotid artery: Patent Left common carotid artery: Patent Cervical right internal carotid artery: Patent with no significant stenosis by NASCET criteria. Cervical left internal carotid artery: Patent with no significant stenosis by NASCET criteria.  Right vertebral artery: Patent Left vertebral artery: Patent The lung apices are clear. The cervical soft tissues are unremarkable. No suspicious osseous lesions. CTA HEAD: Right cavernous internal carotid artery: Patent Left cavernous internal carotid artery: Patent Anterior cerebral arteries: Patent Anterior communicating artery: Patent Right middle cerebral artery: Patent Left middle cerebral artery: Patent Posterior communicating arteries: Patent Posterior cerebral arteries: Patent Basilar artery: Patent Distal vertebral arteries: Patent There is no intracranial aneurysm. The dural venous sinuses are patent. 1.  Abnormal ring-enhancing appearance of the right caudate and putamen, possibly representing subacute ischemia though other etiologies are not excluded. Further evaluation with contrast-enhanced MRI recommended. 2.  No evidence of large vessel occlusion or significant flow-limiting stenosis. 3.  Mild prominence of the ventricular system may be related to ex vacuo dilatation. Superimposed communicating hydrocephalus can be considered. XR CHEST PORT    Result Date: 2/3/2023  EXAM:  XR CHEST PORT INDICATION: CVA COMPARISON: none TECHNIQUE: portable chest AP view FINDINGS: The cardiac silhouette is within normal limits. The pulmonary vasculature is within normal limits. Prior sternotomy The lungs and pleural spaces are clear. The visualized bones and upper abdomen are age-appropriate. No acute process on portable chest.     CTA HEAD NECK W CONT    Result Date: 2/3/2023  EXAM:  CTA HEAD NECK W CONT, CT PERF W CBF INDICATION:   ams COMPARISON:  None. CONTRAST: 100 mL of Isovue-370 TECHNIQUE: Unenhanced  images were obtained to localize the volume for acquisition. Multislice helical axial CT angiography was performed from the aortic arch to the top of the head during uneventful rapid bolus intravenous contrast administration. Coronal and sagittal reformations and 3D post processing was performed.  CT dose reduction was achieved through use of a standardized protocol tailored for this examination and automatic exposure control for dose modulation. FINDINGS: DELAYED CONTRAST-ENHANCED HEAD CT: The ventricles are slightly prominent, possibly related to ex vacuo dilatation from generalized volume loss. There is no midline shift or mass effect. Relative low density with ring enhancement of the right caudate and putamen with surrounding increased low-density in the white matter. No extra-axial collections. Gray-white differentiation is maintained. The basal cisterns are clear. The orbits, paranasal sinuses, and mastoid air cells are unremarkable. CTA NECK: Great vessels: The aortic arch and great vessel origins are not completely visualized. Right subclavian artery: Patent Left subclavian artery: Patent Right common carotid artery: Patent Left common carotid artery: Patent Cervical right internal carotid artery: Patent with no significant stenosis by NASCET criteria. Cervical left internal carotid artery: Patent with no significant stenosis by NASCET criteria. Right vertebral artery: Patent Left vertebral artery: Patent The lung apices are clear. The cervical soft tissues are unremarkable. No suspicious osseous lesions. CTA HEAD: Right cavernous internal carotid artery: Patent Left cavernous internal carotid artery: Patent Anterior cerebral arteries: Patent Anterior communicating artery: Patent Right middle cerebral artery: Patent Left middle cerebral artery: Patent Posterior communicating arteries: Patent Posterior cerebral arteries: Patent Basilar artery: Patent Distal vertebral arteries: Patent There is no intracranial aneurysm. The dural venous sinuses are patent. 1.  Abnormal ring-enhancing appearance of the right caudate and putamen, possibly representing subacute ischemia though other etiologies are not excluded. Further evaluation with contrast-enhanced MRI recommended.  2.  No evidence of large vessel occlusion or significant flow-limiting stenosis. 3.  Mild prominence of the ventricular system may be related to ex vacuo dilatation. Superimposed communicating hydrocephalus can be considered.        PROCEDURES   Unless otherwise noted below, none  Procedures     CRITICAL CARE TIME       EMERGENCY DEPARTMENT COURSE and DIFFERENTIAL DIAGNOSIS/MDM   Vitals:    Vitals:    02/03/23 1345 02/03/23 1526 02/03/23 1558 02/03/23 1600   BP: 125/80 (!) 141/68 125/73    Pulse: 70 64 (!) 53 70   Resp: 17 17     Temp:  98.2 °F (36.8 °C) 97.9 °F (36.6 °C)    SpO2: 99%  99%    Weight:       Height:            Patient was given the following medications:  Medications   sodium chloride (NS) flush 5-10 mL (has no administration in time range)   amiodarone (CORDARONE) tablet 200 mg ( Oral Automatically Held 2/17/23 1800)   aspirin chewable tablet 81 mg (has no administration in time range)   latanoprost (XALATAN) 0.005 % ophthalmic solution 1 Drop (has no administration in time range)   levothyroxine (SYNTHROID) tablet 75 mcg (has no administration in time range)   losartan (COZAAR) tablet 100 mg ( Oral Automatically Held 2/18/23 0900)   metoprolol tartrate (LOPRESSOR) tablet 25 mg ( Oral Automatically Held 2/17/23 1800)   amLODIPine (NORVASC) tablet 5 mg ( Oral Automatically Held 2/18/23 0900)   timolol (TIMOPTIC) 0.5 % ophthalmic solution 1 Drop (has no administration in time range)   pantoprazole (PROTONIX) tablet 40 mg (has no administration in time range)   rivaroxaban (XARELTO) tablet 15 mg (has no administration in time range)   therapeutic multivitamin (THERAGRAN) tablet 1 Tablet (has no administration in time range)   acetaminophen (TYLENOL) tablet 650 mg (has no administration in time range)     Or   acetaminophen (TYLENOL) solution 650 mg (has no administration in time range)     Or   acetaminophen (TYLENOL) suppository 650 mg (has no administration in time range)   magnesium hydroxide (MILK OF MAGNESIA) 400 mg/5 mL oral suspension 30 mL (has no administration in time range)   guaiFENesin ER (MUCINEX) tablet 600 mg (has no administration in time range)   benzonatate (TESSALON) capsule 100 mg (has no administration in time range)   acetaminophen (TYLENOL) tablet 500 mg (has no administration in time range)   iopamidoL (ISOVUE-370) 370 mg iodine /mL (76 %) injection 100 mL (100 mL IntraVENous Given 2/3/23 1059)       CONSULTS: (Who and What was discussed)  IP CONSULT TO NEUROLOGY    Chronic Conditions: On xarelto (likely afib), OA, HTN    Social Determinants affecting Dx or Tx: None    Records Reviewed (source and summary of external notes): Prior medical records and EMS run sheet  Revealing hx of OA, has not been seen here in years, no recent admissions    CC/HPI Summary, DDx, ED Course, and Reassessment: Patient presenting with chief complaint of altered mental status. On exam, patient is hemodynamically stable, slightly tachycardic but with history of A-fib likely as she is on Xarelto. She is not hypoxic, tachypneic or febrile. Not hypotensive. Differentials include infectious etiology, pneumonia versus UTI versus sepsis versus COVID versus flu. Consider electrolyte abnormality, anemia, ACS, arrhythmia. I have no previous documentation of stroke or history of left-sided deficits in the nursing home documentation or in our chart review, last known well is last night, she is more verbal in the emergency department but does have left-sided weakness on my exam.  Given the unclear time of onset, waxing and waning symptoms and unclear history as well as patient is on Xarelto will not be a stroke alert or TNK candidate at this time but will place orders for CT head, CTA and CT perfusion. I attempted to call patient's number listed in the chart but it appears to be her home phone and is not excepting any voicemails at this time. Will obtain labs, chest x-ray, UA, CT imaging as above. Disposition pending work-up.     Son is at bedside, he stated that patient was recently admitted to [facility] for stroke 3 weeks ago. She does have left-sided deficits from the stroke 3 weeks ago. He states she is in [facility] and rehab from the stroke. He states that she was normal last night, she was talking and interacting with him. He states that she is altered from her baseline. We will proceed with work-up and likely admission. ED Course as of 02/03/23 1720   Fri Feb 03, 2023   0936 EPOC with creatinine within normal limits [NM]   1148 Lab work largely benign, troponin mildly elevated at 62, patient remaining hemodynamically stable [NM]   1250 CT perfusion with abnormal ring-enhancing in the putamen, possibly consistent with subacute ischemia. This is of unclear etiology. I am unable to see her previous admission as it was at an OSH and she does not have records with her. She is not febrile and has no leukocytosis so my suspicion for infectious etiology is lower however certainly on ddx given ring enhancing lesion. I updated the patient's son regarding the findings. Will admit to the hospitalist for MRI and further work-up. [NM]      ED Course User Index  [NM] Henry Cade,        Disposition Considerations (Tests not done, Shared Decision Making, Pt Expectation of Test or Tx.):     FINAL IMPRESSION     1. Encephalopathy [G93.40 (ICD-10-CM)]    2. Stroke-like episode [R29.90 (ICD-10-CM)]    3. Cerebral vascular disease [I67.9 (ICD-10-CM)]          DISPOSITION/PLAN   Admitted    Admit Note: Pt is being admitted by Dr. Linda Scruggs . The results of their tests and reason(s) for their admission have been discussed with pt and/or available family. They convey agreement and understanding for the need to be admitted and for the admission diagnosis. (Please note that parts of this dictation were completed with voice recognition software.  Quite often unanticipated grammatical, syntax, homophones, and other interpretive errors are inadvertently transcribed by the computer software. Please disregards these errors.  Please excuse any errors that have escaped final proofreading.)    Sarah Acosta, DO

## 2023-02-04 PROBLEM — I69.954 HEMIPLEGIA OF LEFT NONDOMINANT SIDE AS LATE EFFECT OF CEREBROVASCULAR DISEASE (HCC): Status: ACTIVE | Noted: 2023-02-04

## 2023-02-04 PROBLEM — I69.359 HISTORY OF HEMORRHAGIC STROKE WITH RESIDUAL HEMIPLEGIA (HCC): Status: ACTIVE | Noted: 2023-02-04

## 2023-02-04 LAB
CHOLEST SERPL-MCNC: 164 MG/DL
EST. AVERAGE GLUCOSE BLD GHB EST-MCNC: 105 MG/DL
GLUCOSE BLD STRIP.AUTO-MCNC: 104 MG/DL (ref 65–117)
GLUCOSE BLD STRIP.AUTO-MCNC: 111 MG/DL (ref 65–117)
GLUCOSE BLD STRIP.AUTO-MCNC: 123 MG/DL (ref 65–117)
GLUCOSE BLD STRIP.AUTO-MCNC: 144 MG/DL (ref 65–117)
GLUCOSE BLD STRIP.AUTO-MCNC: 62 MG/DL (ref 65–117)
GLUCOSE BLD STRIP.AUTO-MCNC: 83 MG/DL (ref 65–117)
GLUCOSE BLD STRIP.AUTO-MCNC: 92 MG/DL (ref 65–117)
GLUCOSE BLD STRIP.AUTO-MCNC: 97 MG/DL (ref 65–117)
HBA1C MFR BLD: 5.3 % (ref 4–5.6)
HDLC SERPL-MCNC: 43 MG/DL
HDLC SERPL: 3.8 (ref 0–5)
LDLC SERPL CALC-MCNC: 96.4 MG/DL (ref 0–100)
SERVICE CMNT-IMP: ABNORMAL
SERVICE CMNT-IMP: NORMAL
TRIGL SERPL-MCNC: 123 MG/DL (ref ?–150)
VIT B12 SERPL-MCNC: 1222 PG/ML (ref 193–986)
VLDLC SERPL CALC-MCNC: 24.6 MG/DL

## 2023-02-04 PROCEDURE — 97112 NEUROMUSCULAR REEDUCATION: CPT

## 2023-02-04 PROCEDURE — 51798 US URINE CAPACITY MEASURE: CPT

## 2023-02-04 PROCEDURE — 74011636637 HC RX REV CODE- 636/637: Performed by: STUDENT IN AN ORGANIZED HEALTH CARE EDUCATION/TRAINING PROGRAM

## 2023-02-04 PROCEDURE — 80061 LIPID PANEL: CPT

## 2023-02-04 PROCEDURE — 36415 COLL VENOUS BLD VENIPUNCTURE: CPT

## 2023-02-04 PROCEDURE — 82962 GLUCOSE BLOOD TEST: CPT

## 2023-02-04 PROCEDURE — 99232 SBSQ HOSP IP/OBS MODERATE 35: CPT | Performed by: PSYCHIATRY & NEUROLOGY

## 2023-02-04 PROCEDURE — 97166 OT EVAL MOD COMPLEX 45 MIN: CPT

## 2023-02-04 PROCEDURE — 83036 HEMOGLOBIN GLYCOSYLATED A1C: CPT

## 2023-02-04 PROCEDURE — 74011250637 HC RX REV CODE- 250/637: Performed by: INTERNAL MEDICINE

## 2023-02-04 PROCEDURE — 65270000046 HC RM TELEMETRY

## 2023-02-04 PROCEDURE — 97162 PT EVAL MOD COMPLEX 30 MIN: CPT

## 2023-02-04 PROCEDURE — 74011000250 HC RX REV CODE- 250: Performed by: STUDENT IN AN ORGANIZED HEALTH CARE EDUCATION/TRAINING PROGRAM

## 2023-02-04 PROCEDURE — 74011250637 HC RX REV CODE- 250/637: Performed by: STUDENT IN AN ORGANIZED HEALTH CARE EDUCATION/TRAINING PROGRAM

## 2023-02-04 PROCEDURE — 92610 EVALUATE SWALLOWING FUNCTION: CPT

## 2023-02-04 PROCEDURE — 92522 EVALUATE SPEECH PRODUCTION: CPT

## 2023-02-04 PROCEDURE — 74011250636 HC RX REV CODE- 250/636: Performed by: INTERNAL MEDICINE

## 2023-02-04 RX ORDER — SODIUM CHLORIDE 9 MG/ML
50 INJECTION, SOLUTION INTRAVENOUS CONTINUOUS
Status: DISCONTINUED | OUTPATIENT
Start: 2023-02-04 | End: 2023-02-05

## 2023-02-04 RX ORDER — LANOLIN ALCOHOL/MO/W.PET/CERES
3 CREAM (GRAM) TOPICAL
Status: DISCONTINUED | OUTPATIENT
Start: 2023-02-04 | End: 2023-02-13 | Stop reason: HOSPADM

## 2023-02-04 RX ADMIN — GUAIFENESIN 600 MG: 600 TABLET, EXTENDED RELEASE ORAL at 20:21

## 2023-02-04 RX ADMIN — ACETAMINOPHEN 325MG 650 MG: 325 TABLET ORAL at 20:27

## 2023-02-04 RX ADMIN — PANTOPRAZOLE SODIUM 40 MG: 40 TABLET, DELAYED RELEASE ORAL at 09:23

## 2023-02-04 RX ADMIN — GUAIFENESIN 600 MG: 600 TABLET, EXTENDED RELEASE ORAL at 09:23

## 2023-02-04 RX ADMIN — Medication 2 UNITS: at 11:47

## 2023-02-04 RX ADMIN — TIMOLOL MALEATE 1 DROP: 5 SOLUTION OPHTHALMIC at 17:03

## 2023-02-04 RX ADMIN — LEVOTHYROXINE SODIUM 75 MCG: 0.07 TABLET ORAL at 09:23

## 2023-02-04 RX ADMIN — LACTULOSE 15 ML: 20 SOLUTION ORAL at 09:24

## 2023-02-04 RX ADMIN — TIMOLOL MALEATE 1 DROP: 5 SOLUTION OPHTHALMIC at 09:32

## 2023-02-04 RX ADMIN — THERA TABS 1 TABLET: TAB at 09:23

## 2023-02-04 RX ADMIN — SODIUM CHLORIDE 50 ML/HR: 9 INJECTION, SOLUTION INTRAVENOUS at 15:01

## 2023-02-04 RX ADMIN — LATANOPROST 1 DROP: 50 SOLUTION OPHTHALMIC at 22:41

## 2023-02-04 RX ADMIN — LACTULOSE 15 ML: 20 SOLUTION ORAL at 17:03

## 2023-02-04 RX ADMIN — LACTULOSE 15 ML: 20 SOLUTION ORAL at 22:00

## 2023-02-04 RX ADMIN — ASPIRIN 81 MG: 81 TABLET, CHEWABLE ORAL at 09:23

## 2023-02-04 NOTE — PROGRESS NOTES
Neurology Progress Note    Patient ID:  Isidra Renee  [id number]  80 y.o.  6/14/1932      CHIEF COMPLAINT: Increased left-sided weakness    Subjective:      Patient has complaints of increased left-sided weakness that occurred after he was just discharged from [facility] less than a week ago with acute stroke in the right basal ganglia area that now on MRI scan shows hemorrhagic transformation which is causing her increased weakness on the left side. She was on Xarelto and has been discontinued, and she is now on just a baby aspirin. She has a history of GI bleed in the past, we have reassured her that she could take a coated baby aspirin safely since has been 1 year since her stomach problem. She has a fairly flaccid or dense left hemiplegia now. She does not complain of any headache and we discussed her condition with the patient and her son in detail. We will repeat a CT tomorrow in the morning and she might be able to go back to her rehab center to repeat another CT scan in another week or 2 if tomorrow's are stable. Patient overall doing well for her stroke.     Current Facility-Administered Medications   Medication Dose Route Frequency    lactulose (CHRONULAC) 10 gram/15 mL solution 15 mL  10 g Oral TID    0.9% sodium chloride infusion  50 mL/hr IntraVENous CONTINUOUS    sodium chloride (NS) flush 5-10 mL  5-10 mL IntraVENous PRN    [Held by provider] amiodarone (CORDARONE) tablet 200 mg  200 mg Oral BID    aspirin chewable tablet 81 mg  81 mg Oral DAILY    latanoprost (XALATAN) 0.005 % ophthalmic solution 1 Drop  1 Drop Both Eyes QHS    levothyroxine (SYNTHROID) tablet 75 mcg  75 mcg Oral ACB    [Held by provider] losartan (COZAAR) tablet 100 mg  100 mg Oral DAILY    [Held by provider] metoprolol tartrate (LOPRESSOR) tablet 25 mg  25 mg Oral BID    [Held by provider] amLODIPine (NORVASC) tablet 5 mg  5 mg Oral DAILY    timolol (TIMOPTIC) 0.5 % ophthalmic solution 1 Drop  1 Drop Both Eyes BID pantoprazole (PROTONIX) tablet 40 mg  40 mg Oral ACB    [Held by provider] rivaroxaban (XARELTO) tablet 15 mg  15 mg Oral DAILY    therapeutic multivitamin (THERAGRAN) tablet 1 Tablet  1 Tablet Oral DAILY    acetaminophen (TYLENOL) tablet 650 mg  650 mg Oral Q4H PRN    Or    acetaminophen (TYLENOL) solution 650 mg  650 mg Per NG tube Q4H PRN    Or    acetaminophen (TYLENOL) suppository 650 mg  650 mg Rectal Q4H PRN    magnesium hydroxide (MILK OF MAGNESIA) 400 mg/5 mL oral suspension 30 mL  30 mL Oral DAILY PRN    guaiFENesin ER (MUCINEX) tablet 600 mg  600 mg Oral Q12H    benzonatate (TESSALON) capsule 100 mg  100 mg Oral TID PRN    acetaminophen (TYLENOL) tablet 500 mg  500 mg Oral Q6H PRN    insulin lispro (HUMALOG) injection   SubCUTAneous AC&HS    glucose chewable tablet 16 g  4 Tablet Oral PRN    glucagon (GLUCAGEN) injection 1 mg  1 mg IntraMUSCular PRN    dextrose 10% infusion 0-250 mL  0-250 mL IntraVENous PRN        Past Medical History:   Diagnosis Date    Atrial fibrillation (HCC)     CVA (cerebral vascular accident) ([facility] [address]     GERD (gastroesophageal reflux disease)     Glaucoma     HTN (hypertension)     Hyperlipidemia        No past surgical history on file.     [unfilled]    Social History     Tobacco Use    Smoking status: Never    Smokeless tobacco: Never   Substance Use Topics    Alcohol use: Not Currently       Current Facility-Administered Medications   Medication Dose Route Frequency Provider Last Rate Last Admin    lactulose (CHRONULAC) 10 gram/15 mL solution 15 mL  10 g Oral TID Katey Bush MD   15 mL at 02/04/23 0924    0.9% sodium chloride infusion  50 mL/hr IntraVENous CONTINUOUS Katey Bush MD 50 mL/hr at 02/04/23 1501 50 mL/hr at 02/04/23 1501    sodium chloride (NS) flush 5-10 mL  5-10 mL IntraVENous PRN Allyson Maldonado MD        [facility] by provider] amiodarone (CORDARONE) tablet 200 mg  200 mg Oral BID Allyson Maldonado MD        aspirin chewable tablet 81 mg  81 mg Oral DAILY Randa Faye MD   81 mg at 02/04/23 0923    latanoprost (XALATAN) 0.005 % ophthalmic solution 1 Drop  1 Drop Both Eyes QHS Randa Faye MD   1 Drop at 02/03/23 2237    levothyroxine (SYNTHROID) tablet 75 mcg  75 mcg Oral ACB Randa Faye MD   75 mcg at 02/04/23 8898    [Held by provider] losartan (COZAAR) tablet 100 mg  100 mg Oral DAILY Randa Faye MD        [facility] [place] by provider] metoprolol tartrate (LOPRESSOR) tablet 25 mg  25 mg Oral BID Randa Faye MD        [facility] [place] by provider] amLODIPine (NORVASC) tablet 5 mg  5 mg Oral DAILY Randa Faye MD        timolol (TIMOPTIC) 0.5 % ophthalmic solution 1 Drop  1 Drop Both Eyes BID Randa Faye MD   1 Drop at 02/04/23 0932    pantoprazole (PROTONIX) tablet 40 mg  40 mg Oral ACB Randa Faye MD   40 mg at 02/04/23 3192    [Held by provider] rivaroxaban [facility]) tablet 15 mg  15 mg Oral DAILY Rnada Faye MD        therapeutic multivitamin [facility]) tablet 1 Tablet  1 Tablet Oral DAILY Randa Faye MD   1 Tablet at 02/04/23 8515    acetaminophen (TYLENOL) tablet 650 mg  650 mg Oral Q4H PRN Randa Faye MD        Or    acetaminophen (TYLENOL) solution 650 mg  650 mg Per NG tube Q4H PRN Randa Faye MD        Or    acetaminophen (TYLENOL) suppository 650 mg  650 mg Rectal Q4H PRN Randa Faye MD        magnesium hydroxide (MILK OF MAGNESIA) 400 mg/5 mL oral suspension 30 mL  30 mL Oral DAILY PRN Randa Faye MD        guaiFENesin ER [facility]) tablet 600 mg  600 mg Oral Q12H Randa Faye MD   600 mg at 02/04/23 2916    benzonatate (TESSALON) capsule 100 mg  100 mg Oral TID PRN Randa Faye MD        acetaminophen (TYLENOL) tablet 500 mg  500 mg Oral Q6H PRN Randa Faye MD        insulin lispro (HUMALOG) injection   SubCUTAneous AC&HS Randa Faye MD   2 Units at 02/04/23 1147    glucose chewable tablet 16 g  4 Tablet Oral PRN Oralee MD Neyda        glucagon (GLUCAGEN) injection 1 mg  1 mg IntraMUSCular PRN Scotty Baron MD        dextrose 10% infusion 0-250 mL  0-250 mL IntraVENous PRN Scotty Baron MD           No Known Allergies    Review of Systems:    A comprehensive review of systems was negative except for: Neurological: positive for paresthesia and weakness    Objective:    Objective:     Patient Vitals for the past 24 hrs:   BP Temp Pulse Resp SpO2 Weight   02/04/23 1501 -- -- -- -- -- 133 lb 12.8 oz (60.7 kg)   02/04/23 1100 -- -- (!) 51 -- -- --   02/04/23 1023 137/62 97.3 °F (36.3 °C) (!) 45 18 90 % --   02/04/23 0758 139/61 97.7 °F (36.5 °C) 63 20 100 % --   02/04/23 0447 138/70 -- 61 -- -- --   02/04/23 0342 (!) 171/76 97.7 °F (36.5 °C) (!) 59 18 100 % --   02/03/23 2316 (!) 146/82 97.9 °F (36.6 °C) (!) 53 18 90 % --   02/03/23 2013 (!) 152/72 98.2 °F (36.8 °C) 62 18 100 % --   02/03/23 1822 135/73 97.7 °F (36.5 °C) 68 18 100 % --         Lab Review   Recent Results (from the past 24 hour(s))   GLUCOSE, POC    Collection Time: 02/03/23  6:19 PM   Result Value Ref Range    Glucose (POC) 79 65 - 117 mg/dL    Performed by Gregorio Mcadams (TRBRYSON RN)    GLUCOSE, POC    Collection Time: 02/03/23 10:06 PM   Result Value Ref Range    Glucose (POC) 53 (LL) 65 - 117 mg/dL    Performed by Kristal Plush PCT    GLUCOSE, POC    Collection Time: 02/03/23 10:11 PM   Result Value Ref Range    Glucose (POC) 71 65 - 117 mg/dL    Performed by Kristal Plush PCT    GLUCOSE, POC    Collection Time: 02/03/23 10:34 PM   Result Value Ref Range    Glucose (POC) 85 65 - 117 mg/dL    Performed by Kristal Plush PCT    AMMONIA    Collection Time: 02/03/23 10:46 PM   Result Value Ref Range    Ammonia, plasma 70 (H) <32 UMOL/L   VITAMIN B12    Collection Time: 02/03/23 10:46 PM   Result Value Ref Range    Vitamin B12 1,222 (H) 193 - 986 pg/mL   TSH 3RD GENERATION    Collection Time: 02/03/23 10:46 PM   Result Value Ref Range    TSH 2.95 0.36 - 3.74 uIU/mL   LIPID PANEL Collection Time: 02/04/23  2:47 AM   Result Value Ref Range    Cholesterol, total 164 <200 MG/DL    Triglyceride 123 <150 MG/DL    HDL Cholesterol 43 MG/DL    LDL, calculated 96.4 0 - 100 MG/DL    VLDL, calculated 24.6 MG/DL    CHOL/HDL Ratio 3.8 0.0 - 5.0     HEMOGLOBIN A1C WITH EAG    Collection Time: 02/04/23  2:47 AM   Result Value Ref Range    Hemoglobin A1c 5.3 4.0 - 5.6 %    Est. average glucose 105 mg/dL   GLUCOSE, POC    Collection Time: 02/04/23  6:31 AM   Result Value Ref Range    Glucose (POC) 111 65 - 117 mg/dL    Performed by Mily Cuevas PCT    GLUCOSE, POC    Collection Time: 02/04/23 11:11 AM   Result Value Ref Range    Glucose (POC) 144 (H) 65 - 117 mg/dL    Performed by Windy Stewart PCT            Additional comments:I personally viewed and interpreted the patient's MRI scan and CT scans  MRI Results (most recent):  Results from Hospital Encounter encounter on 02/03/23    MRI BRAIN W WO CONT    Narrative  PRELIMINARY REPORT  No emergent process. Restricted diffusion surrounding the area of enhancement  previously identified    Preliminary report was provided by Dr. Kathy Myers  Final report to follow. END PRELIMINARY REPORT      EXAM:  MRI BRAIN W WO CONT    INDICATION:    Abnormal ring-enhancing appearance of the right caudate and  putamen, recent CVA 3wks ago    COMPARISON:  CTA head neck 2/3/2023. CONTRAST: 12 ml ProHance. TECHNIQUE:  Multiplanar multisequence acquisition without and with contrast of the brain. FINDINGS:  Subacute infarct in the right corona radiata/basal ganglia with associated  subacute hematomas within the right caudate, putamen and globus pallidus with  associated peripheral enhancement within the right caudate and putamen. Generalized parenchymal volume loss with commensurate dilation of the sulci and  ventricular system. Incidental cavum vergae.  Confluent areas of periventricular  and deep white matter T2/FLAIR hyperintensity, consistent with moderate to  severe chronic microvascular ischemic disease. Tiny chronic infarct in the right  cerebellum. There is no cerebellar tonsillar herniation. Expected arterial  flow-voids are present. Incidental small right cerebellar developmental venous  anomaly. The paranasal sinuses are clear. Trace left mastoid effusion. The orbital  contents are within normal limits with bilateral lens implants. No significant  osseous or scalp lesions are identified. Grade 1 anterolisthesis of C3 on C4  secondary to severe facet arthropathy in the cervical spine, with multilevel  degenerative disc disease also noted. Impression  1. Subacute hemorrhage with associated peripheral enhancement throughout the  right basal ganglia, associated with a subacute infarct in the right corona  radiata/basal ganglia. Follow-up brain MRI in 6-8 weeks is recommended to  establish expected evolution of the suspected subacute infarct with hemorrhagic  conversion. 2. Generalized parenchymal volume loss and moderate to severe chronic  microvascular ischemic disease. Tiny chronic infarct in the right cerebellum. Results from [facility] encounter on 02/03/23    MRI BRAIN W WO CONT    Narrative  PRELIMINARY REPORT  No emergent process. Restricted diffusion surrounding the area of enhancement  previously identified    Preliminary report was provided by Dr. Radha Shelton  Final report to follow. END PRELIMINARY REPORT      EXAM:  MRI BRAIN W WO CONT    INDICATION:    Abnormal ring-enhancing appearance of the right caudate and  putamen, recent CVA 3wks ago    COMPARISON:  CTA head neck 2/3/2023. CONTRAST: 12 ml ProHance. TECHNIQUE:  Multiplanar multisequence acquisition without and with contrast of the brain.     FINDINGS:  Subacute infarct in the right corona radiata/basal ganglia with associated  subacute hematomas within the right caudate, putamen and globus pallidus with  associated peripheral enhancement within the right caudate and putamen. Generalized parenchymal volume loss with commensurate dilation of the sulci and  ventricular system. Incidental cavum vergae. Confluent areas of periventricular  and deep white matter T2/FLAIR hyperintensity, consistent with moderate to  severe chronic microvascular ischemic disease. Tiny chronic infarct in the right  cerebellum. There is no cerebellar tonsillar herniation. Expected arterial  flow-voids are present. Incidental small right cerebellar developmental venous  anomaly. The paranasal sinuses are clear. Trace left mastoid effusion. The orbital  contents are within normal limits with bilateral lens implants. No significant  osseous or scalp lesions are identified. Grade 1 anterolisthesis of C3 on C4  secondary to severe facet arthropathy in the cervical spine, with multilevel  degenerative disc disease also noted. Impression  1. Subacute hemorrhage with associated peripheral enhancement throughout the  right basal ganglia, associated with a subacute infarct in the right corona  radiata/basal ganglia. Follow-up brain MRI in 6-8 weeks is recommended to  establish expected evolution of the suspected subacute infarct with hemorrhagic  conversion. 2. Generalized parenchymal volume loss and moderate to severe chronic  microvascular ischemic disease. Tiny chronic infarct in the right cerebellum. NEUROLOGICAL EXAM:    Appearance: The patient is well developed, well nourished, provides a coherent history and is in no acute distress. Mental Status: Oriented to time, place and person and the president, cognitive function and fund of knowledge is probably normal for her age. Speech is fluent without aphasia or dysarthria. Mood and affect appropriate. Cranial Nerves:   Intact visual fields. Fundi are benign, discs are flat, no lesions seen on funduscopy. ROVERTO, EOM's full, no nystagmus, no ptosis. Facial sensation is normal. Corneal reflexes are not tested.  Facial movement is weak on the left. Hearing is normal bilaterally. Palate is midline with normal sternocleidomastoid and trapezius muscles are normal. Tongue is midline. Neck without meningismus or bruits   Motor:  5/5 strength in upper and lower proximal and distal muscles on the right, but dense almost flaccid left hemiplegia persists. Normal bulk and tone. No fasciculations. Rapid alternating movement is symmetric and intact in all extremities   Reflexes:   Deep tendon reflexes 1+/4 and symmetrical.  No babinski or clonus present   Sensory:   Normal to touch, pinprick and temperature and vibration. DSS is intact   Gait:  Not testable    Tremor:   No tremor noted. Cerebellar:  Not tested abnormal cerebellar signs present on Romberg, tandem, and finger-nose-finger exam.   Neurovascular:  Abnormal heart sounds and irregular rhythm, peripheral pulses decreased and no carotid bruits. Assessment:         ICD-10-CM ICD-9-CM    1. Encephalopathy [G93.40 (ICD-10-CM)]  G93.40 348.30       2. Stroke-like episode [R29.90 (ICD-10-CM)]  R29.90 781.99       3. 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  We will repeat her CT scan tomorrow to make sure there is no enlarging hematoma, and if that is stable we can probably let her go back to rehab and repeat her scan in a week to 2 as an outpatient. Discussed with the patient and her son in detail and they agree with plans and therapy. Discussed with nursing staff also.   Continue excellent medical care, 45 minutes spent with the patient going over her chest, reviewing her scans personally, and discussing her condition with the patient and wife and the staff all in detail.       Signed:  Aileen Montes MD  2/4/2023  4:19 PM    None  None

## 2023-02-04 NOTE — PROGRESS NOTES
Problem: Mobility Impaired (Adult and Pediatric)  Goal: *Acute Goals and Plan of Care (Insert Text)  Description:   FUNCTIONAL STATUS PRIOR TO ADMISSION: per chart, patient was independent prior to CVA which was 3 weeks ago. Patient and chart report that she has been bedbound since CVA. Patient with L hemiplegia. HOME SUPPORT PRIOR TO ADMISSION: patient at SNF since CVA. Physical Therapy Goals  Initiated 2/4/2023  1. Patient will move from supine to sit and sit to supine , scoot up and down, and roll side to side in bed with moderate assistance  within 7 day(s). 2.  Patient will transfer from bed to chair and chair to bed with maximal assistance using the least restrictive device within 7 day(s). 3.  Patient will perform sit to stand with maximal assistance within 7 day(s). 4.  Patient will demonstrate sitting EOB x 5 minutes without external support, requiring supervision in preparation for transfers within 7 days. Outcome: Not Met   PHYSICAL THERAPY EVALUATION- NEURO POPULATION  Patient: Rabia Calles (80 y.o. female)  Date: 2/4/2023  Primary Diagnosis: Stroke-like episode [R29.90]       Precautions:   Bed Alarm, Contact      ASSESSMENT  Based on the objective data described below, the patient presents with decreased strength, decrease functional mobility, impaired sitting balance, and L hemiplegia following stroke work up. Patient with L hemiplegia from previous CVA from 3 weeks ago. Patient required max-total A x 2 for bed mobility. Patient with inattention/neglect of L side although able to find L arm with max cues. She was able to briefly maintain sitting balance on EOB x 10 seconds without external support, although immediately with posterior LOB. Recommend returning to SNF for rehab as patient was independent prior. Current Level of Function Impacting Discharge (mobility/balance): max-total A x 2 for bed mobility    Functional Outcome Measure:  The patient scored Total: 1/56 on the [facility] Assessment which is indicative of high fall risk. Other factors to consider for discharge: fall risk, L hemiplegia     Patient will benefit from skilled therapy intervention to address the above noted impairments. PLAN :  Recommendations and Planned Interventions: bed mobility training, therapeutic exercises, neuromuscular re-education, patient and family training/education, and therapeutic activities      Frequency/Duration: Patient will be followed by physical therapy:  3 times a week to address goals. Recommendation for discharge: (in order for the patient to meet his/her long term goals)  Therapy up to 5 days/week in SNF setting    This discharge recommendation:  A follow-up discussion with the attending provider and/or case management is planned    IF patient discharges home will need the following DME: patient owns DME required for discharge         SUBJECTIVE:   Patient stated I don't think I was getting any therapy.     OBJECTIVE DATA SUMMARY:   HISTORY:    Past Medical History:   Diagnosis Date    Atrial fibrillation ([facility] [address]     CVA (cerebral vascular accident) ([facility] [address]     GERD (gastroesophageal reflux disease)     Glaucoma     HTN (hypertension)     Hyperlipidemia    No past surgical history on file.     Personal factors and/or comorbidities impacting plan of care: fall risk, L hemiplegia    Home Situation  Home Environment: [address] [place] [address] Name: [facility].    EXAMINATION/PRESENTATION/DECISION MAKING:   Critical Behavior:  Neurologic State: Alert  Orientation Level: Oriented X4  Cognition: Follows commands     Hearing:     Skin:  intact  Edema: none  Range Of Motion:  AROM: Grossly decreased, non-functional (L UE and LLE flaccid, decreased but functional on R side)           PROM: Generally decreased, functional           Strength:    Strength: Grossly decreased, non-functional (L UE and LLE flaccid, decreased but functional on R side) Tone & Sensation:   Tone: Abnormal (L UE and LLE flaccid, decreased but functional on R side)              Sensation: Impaired (on L)               Coordination:  Coordination: Grossly decreased, non-functional (L UE and LLE flaccid, decreased but functional on R side)  Vision:      Functional Mobility:  Bed Mobility:  Rolling: Assist x2; Moderate assistance  Supine to Sit: Assist x2  Sit to Supine: Maximum assistance; Total assistance;Assist x2  Scooting: Total assistance;Assist x2  Transfers:  Sit to Stand:  (not tested)                          Balance:   Sitting: Impaired; Without support  Sitting - Static: Poor (constant support)  Sitting - Dynamic: Poor (constant support)  Standing:  (not tested)  Ambulation/Gait Training:                                                          Functional Measure  Boo Balance Test:    Sitting to Standin  Standing Unsupported: 0  Sitting with Back Unsupported: 1  Standing to Sittin  Transfers: 0  Standing Unsupported with Eyes Closed: 0  Standing Unsupported with Feet Together: 0  Reach Forward with Outstretched Arm: 0   Object: 0  Turn to Look Over Shoulders: 0  Turn 360 Degrees: 0  Alternate Foot on Step/Stool: 0  Standing Unsupported One Foot in Front: 0  Stand on One Le  Total:          56=Maximum possible score;   0-20=High fall risk  21-40=Moderate fall risk   41-56=Low fall risk        Physical Therapy Evaluation Charge Determination   History Examination Presentation Decision-Making   MEDIUM  Complexity : 1-2 comorbidities / personal factors will impact the outcome/ POC  MEDIUM Complexity : 3 Standardized tests and measures addressing body structure, function, activity limitation and / or participation in recreation  MEDIUM Complexity : Evolving with changing characteristics  Other outcome measures Boo  HIGH       Based on the above components, the patient evaluation is determined to be of the following complexity level: MEDIUM        Activity Tolerance:   Fair, Poor, and requires rest breaks      After treatment patient left in no apparent distress:   Supine in bed, Heels elevated for pressure relief, Call bell within reach, Bed / chair alarm activated, and Side rails x 3    COMMUNICATION/EDUCATION:   The patients plan of care was discussed with: Occupational therapist and Registered nurse. Did not educate patient on BEFAST this date due to confusion and attention. Fall prevention education was provided and the patient/caregiver indicated understanding., Patient/family have participated as able in goal setting and plan of care. , and Patient/family agree to work toward stated goals and plan of care.     Thank you for this referral.  Sheldon Nunez, PT, DPT   Time Calculation: 21 mins

## 2023-02-04 NOTE — PROGRESS NOTES
Transition of Care Plan:    RUR: 13%  Disposition: [facility]  If SNF or IPR: Date FOC offered:  Date FOC received:  Date authorization started with reference number:  Date authorization received and expires:  Accepting facility:  Follow up appointments: Pt does not have a PCP  DME needed: None  Transportation at Discharge: Pt will need medical transport at d/c.   Valerio Shorten or means to access home:   Pt has access     IM Medicare Letter: 2nd IM Letter needed at d/c. Is patient a  and connected with the [facility]? No              If yes, was [employer] transfer form completed and [facility] notified? Caregiver Contact: Tha Quezada [phone]  Discharge Caregiver contacted prior to discharge? CM will notify caregiver at d/c. Care Conference needed?: No    Reason for Admission:   Stroke-like episode                    RUR Score:    13% Low risk for readmission              PCP: First and Last name:   None     Name of Practice:    Are you a current patient: Yes/No:    Approximate date of last visit:    Can you participate in a virtual visit if needed:     Do you (patient/family) have any concerns for transition/discharge? Plan for utilizing home health:   Pt's son is receptive to home health at d/c. Current Advanced Directive/Advance Care Plan:  Full Code; No ACP on file. Advance Care Planning   Healthcare Decision Maker: Tha Quezada       Click here to complete INSOMENIA including selection of the Healthcare Decision Maker Relationship (ie \"Primary\")  Today we documented Decision Maker(s) consistent with Legal Next of Kin hierarchy. CM met with pt and pt's son at bedside to discuss d/c plan. CM verified pt's demographics, insurance and PCP. Pt is a 81 y/o  [place] male admitted to [facility] on 2/3/2023 for Stroke-like episode. Pt does not have a PCP. Pt uses [address] on [facility]. Pt resides alone in a tri-level for 4/5 ASHWINI. Pt does not drive. Pt needs assistance with ADL's and IADL's. No HH or IPR in the past. No DME's. Pt was in [address] [place] [address] in the past. Pt is FULL code status. No ACP on file. Pt will need medical transport at d/c.     Pt's son stated that pt will be going to [address] at d/c and that Yasmeen Longoria will need to do an assessment before pt is discharge. Care Management Interventions  PCP Verified by CM:  (Pt does not have a PCP.)  Palliative Care Criteria Met (RRAT>21 & CHF Dx)?: No  Mode of Transport at Discharge:  (Pt will need medical transport at d/c. )  Transition of Care Consult (CM Consult): Discharge Planning ([address])  Discharge Durable Medical Equipment: No (No DME's)  Physical Therapy Consult: Yes  Occupational Therapy Consult: Yes  Speech Therapy Consult: Yes  Support Systems: Child(beatriz) (Pt resides alone in a tri-level for 4/5 ASHWINI. )  Confirm Follow Up Transport: Family (Pt does not drive)  [facility] Provided?: No  Discharge Location  Patient Expects to be Discharged to[de-identified]  ([address] )    CM will continue to follow patient for discharge planning needs and arrange for services as deemed necessary.     Evelyne Concepcion [address]  [phone]

## 2023-02-04 NOTE — PROGRESS NOTES
Problem: Dysphagia (Adult)  Goal: *Acute Goals and Plan of Care (Insert Text)  Description: Speech pathology goals  Initiated 2023  1. Patient will tolerate soft and bite sized/thin liquid diet with no adverse effects within 7 days  Outcome: Progressing Towards Goal     Problem: Communication Impaired (Adult)  Goal: *Acute Goals and Plan of Care (Insert Text)  Description: Speech pathology goals  Initiated 2023   1. Patient will achieve 80% intelligibility with functional conversation within 7 days  Outcome: Progressing Towards Goal     [address]  Patient: Radha Rivas (80 y.o. female)  Date: 2023  Primary Diagnosis: Stroke-like episode [R29.90]       Precautions:        ASSESSMENT :  Based on the objective data described below, the patient presents with suspected mild oral/pharyngeal dysphagia. Patient with prolonged mastication and delayed posterior propulsion, and she reported that the easy to chew diet was too hard. Note per facility records that patient's baseline diet is soft and bite sized/thin liquid. Patient also reported she does not like to chew because she bites her lip, and note L labial droop. Patient with audible swallow but no overt s/s aspiration observed. Note patient with CVA ~3 weeks ago, and MRI this admission showed the followin. Subacute hemorrhage with associated peripheral enhancement throughout the  right basal ganglia, associated with a subacute infarct in the right corona  radiata/basal ganglia. Follow-up brain MRI in 6-8 weeks is recommended to  establish expected evolution of the suspected subacute infarct with hemorrhagic  conversion. 2. Generalized parenchymal volume loss and moderate to severe chronic  microvascular ischemic disease. Tiny chronic infarct in the right cerebellum. Patient is at risk for aspiration secondary to CVA. Note CXR showed no acute process.     Patient also with mild-moderate dysarthria characterized by blended word boundaries, imprecise articulation due to L labial/lingual weakness, and decreased breath support. Discussed motor speech strategies of slow, loud, clear speech, however suspect reinforcement will be needed. Patient will benefit from skilled intervention to address the above impairments. Patients rehabilitation potential is considered to be Fair     PLAN :  Recommendations and Planned Interventions:  -Soft and bite sized/thin liquid diet, straws ok  -Meds per patient preference. May need large meds whole in puree  -Cue patient for slow, loud, clear speech  -SLP to follow for diet tolerance and motor speech treatment  Frequency/Duration: Patient will be followed by speech-language pathology 2 times a week to address goals. Discharge Recommendations: Skilled Nursing Facility     SUBJECTIVE:   Patient stated Since my stroke re: dysarthria. Ox4. OBJECTIVE:     Past Medical History:   Diagnosis Date    Atrial fibrillation ([facility]     CVA (cerebral vascular accident) ([facility]     GERD (gastroesophageal reflux disease)     Glaucoma     HTN (hypertension)     Hyperlipidemia    No past surgical history on file. Prior Level of Function/Home Situation:      Diet prior to admission: soft and bite sized/thin liquid   Current Diet:  easy to chew/thin liquid   Cognitive and Communication Status:  Neurologic State: Alert  Orientation Level: Oriented X4  Cognition: Follows commands           Swallowing Evaluation:   Oral Assessment:  Oral Assessment  Labial: Left droop  Dentition: Natural;Limited  Oral Hygiene: moist  Lingual: Left deviation  Velum: Unable to visualize  Mandible: No impairment  P.O. Trials:  Patient Position: up in bed  Vocal quality prior to P.O.: No impairment  Consistency Presented: Thin liquid; Solid;Puree  How Presented: SLP-fed/presented;Straw;Successive swallows;Spoon     Bolus Acceptance: No impairment  Bolus Formation/Control: Impaired  Type of Impairment: Delayed;Mastication  Propulsion: Delayed (# of seconds)  Oral Residue: None        Aspiration Signs/Symptoms: None  Pharyngeal Phase Characteristics: Audible swallow                     NOMS:   The NOMS functional outcome measure was used to quantify this patient's level of swallowing impairment. Based on the NOMS, the patient was determined to be at level 5 for swallow function       NOMS Swallowing Levels:  Level 1 (CN): NPO  Level 2 (CM): NPO but takes consistency in therapy  Level 3 (CL): Takes less than 50% of nutrition p.o. and continues with nonoral feedings; and/or safe with mod cues; and/or max diet restriction  Level 4 (CK): Safe swallow but needs mod cues; and/or mod diet restriction; and/or still requires some nonoral feeding/supplements  Level 5 (CJ): Safe swallow with min diet restriction; and/or needs min cues  Level 6 (CI): Independent with p.o.; rare cues; usually self cues; may need to avoid some foods or needs extra time  Level 7 ([address] [place] [address]): Independent for all p.o.  KODI (2003). National Outcomes Measurement System (NOMS): Adult Speech-Language Pathology User's Guide. Speech/Language Evaluation  Motor Speech:  Oral-Motor Structure/Motor Speech  Labial: Left droop  Dentition: Natural;Limited  Oral Hygiene: moist  Lingual: Left deviation  Velum: Unable to visualize  Mandible: No impairment  Apraxic Characteristics: None  Dysarthric Characteristics: Blended word boundaries; Decreased breath support; Imprecise  Intelligibility: Impaired  Overall Impairment Severity: Mild-moderate  Compensatory Strategies for Motor Speech: slow, loud, clear     Voice:                 Vocal Quality: No impairment             Pain:  Pain Scale 1: Numeric (0 - 10)  Pain Intensity 1: 0       After treatment:   Patient left in no apparent distress in bed, Call bell within reach, and Nursing notified    COMMUNICATION/EDUCATION:   Patient was educated regarding her deficit(s) of dysphagia, dysarthria as this relates to her diagnosis of CVA. She demonstrated Good understanding as evidenced by verbalizing understanding. The patient's plan of care including recommendations, planned interventions, and recommended diet changes were discussed with: Registered nurse. Patient/family have participated as able in goal setting and plan of care. Patient/family agree to work toward stated goals and plan of care.     Thank you for this referral.  Mireille Reyes, SLP  Time Calculation: 16 mins

## 2023-02-04 NOTE — PROGRESS NOTES
Problem: Self Care Deficits Care Plan (Adult)  Goal: *Acute Goals and Plan of Care (Insert Text)  Description: FUNCTIONAL STATUS PRIOR TO ADMISSION: Per chart review, patient was independent until 3 weeks ago following CVA. Since CVA, she has been bed bound at SNF with L hemiplegia and inattention. HOME SUPPORT: Patient from SNF. Occupational Therapy Goals  Initiated 2/4/2023  1. Patient will perform supine > sit EOB to prepare for OOB ADLs with moderate assistance within 7 day(s). 2.  Patient will perform simple grooming task with supervision/set-up within 7 day(s). 3.  Patient will perform upper body dressing using sally technique with moderate assistance within 7 day(s). 4.  Patient will perform toilet transfers to [facility] with maximal assistance within 7 day(s). 5.  Patient will perform all aspects of toileting with maximal assistance within 7 day(s). 6.  Patient will participate in upper extremity therapeutic exercise/activities with minimal assistance for 3 minutes within 7 day(s). Outcome: Not Met   OCCUPATIONAL THERAPY EVALUATION  Patient: Benjy De Paz (80 y.o. female)  Date: 2/4/2023  Primary Diagnosis: Stroke-like episode [R29.90]       Precautions: Bed Alarm, Contact    ASSESSMENT  Based on the objective data described below, the patient presents with decreased independence in self-care and functional mobility secondary to gross weakness, impaired sitting balance, L hemiplegia, L vision inattention, drowsiness, decreased insight/safety awareness, and decreased activity tolerance. Patient has CVA 3 weeks ago resulting in L hemiplegia. Patient is functioning significantly below her baseline for ADLs and functional mobility, now completing ADLs with min to total assist and bed mobility with mod x2 to total assist x2. Patient received semisupine in bed and cleared for therapy by nursing.  She required max/total assist x2 for supine > sit and was only able to briefly maintain sitting balance unsupported but consistently demonstrating posterior LOB. Patient initially demonstrated L inattention and required max cues to attend L side to manage LUE. She tolerated sitting EOB for approx 6 mins before reporting increased fatigue. Once returned to supine, patient requested to sit on bed pan for bowel movement and required mod x2 to roll onto L side for placement. She required total assist for pericare and was left semisupine in bed with all needs met, VSS, and bed alarmed. Patient would continue to benefit from skilled OT services during acute hospital stay. Anticipate patient will need to return to SNF rehab. Current Level of Function Impacting Discharge (ADLs/self-care): min to total assist for ADLs, mod x2 to total assist x2 for bed mobility     Functional Outcome Measure: The patient scored 10/100 on the Barthel Index outcome measure which is indicative of being totally dependent in ADLs. Other factors to consider for discharge: fall risk, L hemiplegia, L inattention, drowsy      Patient will benefit from skilled therapy intervention to address the above noted impairments. PLAN :  Recommendations and Planned Interventions: self care training, functional mobility training, therapeutic exercise, balance training, therapeutic activities, endurance activities, patient education, home safety training, and family training/education    Frequency/Duration: Patient will be followed by occupational therapy 3 times a week to address goals. Recommendation for discharge: (in order for the patient to meet his/her long term goals)  Therapy up to 5 days/week in SNF setting    This discharge recommendation:  Has not yet been discussed the attending provider and/or case management    IF patient discharges home will need the following DME: TBD in SNF rehab       SUBJECTIVE:   Patient stated I wasn't getting any therapy.     OBJECTIVE DATA SUMMARY:   HISTORY:   Past Medical History:   Diagnosis Date    Atrial fibrillation [facility])     CVA (cerebral vascular accident) ([address]     GERD (gastroesophageal reflux disease)     Glaucoma     HTN (hypertension)     Hyperlipidemia    No past surgical history on file. Expanded or extensive additional review of patient history:     Home Situation  Home Environment: [address] [place] [address] Name: [facility] [employer]  One/Two Story Residence: Other (Comment)  Living Alone: Yes  Support Systems: Child(beatriz)  Patient Expects to be Discharged to[de-identified] Assisted Living  Current DME Used/Available at Home: None    Hand dominance: Right    EXAMINATION OF PERFORMANCE DEFICITS:  Cognitive/Behavioral Status:  Neurologic State: Drowsy  Orientation Level: Oriented to person;Oriented to place;Oriented to time;Disoriented to situation  Cognition: Follows commands; Impaired decision making;Poor safety awareness  Perception: Cues to maintain midline in sitting  Perseveration: No perseveration noted  Safety/Judgement: Awareness of environment;Decreased insight into deficits; Fall prevention    Hearing: Auditory  Auditory Impairment: Hard of hearing, bilateral    Vision/Perceptual:    Unable to formally assess but did appear to have L visual neglect/inattention     Range of Motion:  AROM: Grossly decreased, non-functional (L UE and LLE flaccid, decreased but functional on R side)  PROM: Generally decreased, functional    Strength:  Strength: Grossly decreased, non-functional (L UE and LLE flaccid, decreased but functional on R side)    Coordination:  Coordination: Grossly decreased, non-functional (L UE and LLE flaccid, decreased but functional on R side)  Fine Motor Skills-Upper: Left Impaired;Right Intact    Gross Motor Skills-Upper: Left Impaired;Right Intact    Tone & Sensation:  Tone: Abnormal (L UE and LLE flaccid, decreased but functional on R side)  Sensation: Impaired (on L)    Balance:  Sitting: Impaired; Without support  Sitting - Static: Poor (constant support)  Sitting - Dynamic: Poor (constant support)  Standing:  (not tested)    Functional Mobility and Transfers for ADLs:  Bed Mobility:  Rolling: Assist x2; Moderate assistance  Supine to Sit: Assist x2;Maximum assistance  Sit to Supine: Maximum assistance; Total assistance;Assist x2  Scooting: Total assistance;Assist x2    Transfers:  Sit to Stand:  (not tested)    ADL Assessment:  Feeding: Minimum assistance  Oral Facial Hygiene/Grooming: Minimum assistance  Bathing: Maximum assistance  Upper Body Dressing: Maximum assistance  Lower Body Dressing: Total assistance  Toileting: Total assistance    ADL Intervention and task modifications:    Lower Body Dressing Assistance  Socks: Total assistance (dependent)  Position Performed: Supine  Cues: Don;Physical assistance; Tactile cues provided;Verbal cues provided    Cognitive Retraining  Safety/Judgement: Awareness of environment;Decreased insight into deficits; Fall prevention    Functional Measure:    Barthel Index:  Bathin  Bladder: 0  Bowels: 5  Groomin  Dressin  Feedin  Mobility: 0  Stairs: 0  Toilet Use: 0  Transfer (Bed to Chair and Back): 0  Total: 10/100      The Barthel ADL Index: Guidelines  1. The index should be used as a record of what a patient does, not as a record of what a patient could do. 2. The main aim is to establish degree of independence from any help, physical or verbal, however minor and for whatever reason. 3. The need for supervision renders the patient not independent. 4. A patient's performance should be established using the best available evidence. Asking the patient, friends/relatives and nurses are the usual sources, but direct observation and common sense are also important. However direct testing is not needed. 5. Usually the patient's performance over the preceding 24-48 hours is important, but occasionally longer periods will be relevant. 6. Middle categories imply that the patient supplies over 50 per cent of the effort.   7. Use of aids to be independent is allowed. Score Interpretation (from [facility])    Independent   60-79 Minimally independent   40-59 Partially dependent   20-39 Very dependent   <20 Totally dependent     -Kelsy Seay., Barthel, D.W. (1965). Functional evaluation: the Barthel Index. [facility] ([address] (1997). The Barthel activities of daily living index: self-reporting versus actual performance in the old (> or = 75 years). Journal of [address] [place] [address] 45(7), [facility], J.J.M.F, Levar Elizalde., Vicenta Waddell. (1999). Measuring the change in disability after inpatient rehabilitation; comparison of the responsiveness of the Barthel Index and Functional [place] Measure. Journal of Neurology, Neurosurgery, and Psychiatry, 66(4), 922-042. KELLY Lopez, OFELIA Dodge, & Deedee Lacy, MMAY. (2004) Assessment of post-stroke quality of life in cost-effectiveness studies: The usefulness of the Barthel Index and the EuroQoL-5D. Quality of Life Research, 13, 177-58      Based on the above components, the patient evaluation is determined to be of the following complexity level: MEDIUM  Pain Rating:  Patient c/o L shoulder pain. Activity Tolerance:   Fair and Poor    After treatment patient left in no apparent distress:    Supine in bed, Call bell within reach, Bed / chair alarm activated, and Side rails x 3    COMMUNICATION/EDUCATION:   The patients plan of care was discussed with: Physical therapist and Registered nurse. Home safety education was provided and the patient/caregiver indicated understanding., Patient/family have participated as able in goal setting and plan of care. , and Patient/family agree to work toward stated goals and plan of care. This patients plan of care is appropriate for delegation to [facility].     Thank you for this referral.  Mary Abdullahi, OTR/L  Time Calculation: 20 mins

## 2023-02-04 NOTE — PROGRESS NOTES
Hospitalist Progress Note    Subjective:   Daily Progress Note: 2/4/2023 2:24 PM    Hospital Course:  Isidra Renee is a 80 y.o.  female who presents with PMH of HTN, afib, CVA, DM, glaucoma, hypothyroidism, and GERD. Patient presenting from [facility] after episode of AMS this morning. Reportedly she fell from bed level yesterday and did not hit her head. Yesterday post fall she was fine at new baseline of bed bound with some dysarthria, however able to talk. This morning staff noted that she was altered by finding that she only responded to pain. Per son when he presented to ED, patient was back to her new baseline post CVA. Prior to stroke 3 weeks ago, she was living independently without a assisted walking device. She was driving and managing her ADLs independently with some short term memory difficulties. Since 1/2023 CVA she has been in rehab with left sided hemiplegia and left sided facial droop. MRI brain shows subacute hemorrhage associated with subacute infarct in right corona radiata/basal ganglia. Subjective:  Patient was alert, awake, and talking this morning. Assessment / Plan:  Acute encephalopathy:  Transient  Mental status now improved  Apparently there was concern for slurring of speech this morning, plan is to repeat CT head in morning    Cough:  Rhino and enterovirus +  Mucinex    Hypertension  Afib:  Hold xarelto in the setting of subacute hemorrhage  Hold amiodarone, amlodipine, losartan and metoprolol    Diabetes mellitus:  Continue sliding scale    Glaucoma:  Continue eye drops        18.5 - 24.9 Normal weight / Body mass index is 20.53 kg/m².     Code status: Full  Prophylaxis: SCD's  Recommended Disposition: SNF/LTC  JACKY: 2/5/23  Barrier: neurology clearance       Current Facility-Administered Medications   Medication Dose Route Frequency    lactulose (CHRONULAC) 10 gram/15 mL solution 15 mL  10 g Oral TID    sodium chloride (NS) flush 5-10 mL  5-10 mL IntraVENous PRN [Held by provider] amiodarone (CORDARONE) tablet 200 mg  200 mg Oral BID    aspirin chewable tablet 81 mg  81 mg Oral DAILY    latanoprost (XALATAN) 0.005 % ophthalmic solution 1 Drop  1 Drop Both Eyes QHS    levothyroxine (SYNTHROID) tablet 75 mcg  75 mcg Oral ACB    [Held by provider] losartan (COZAAR) tablet 100 mg  100 mg Oral DAILY    [Held by provider] metoprolol tartrate (LOPRESSOR) tablet 25 mg  25 mg Oral BID    [Held by provider] amLODIPine (NORVASC) tablet 5 mg  5 mg Oral DAILY    timolol (TIMOPTIC) 0.5 % ophthalmic solution 1 Drop  1 Drop Both Eyes BID    pantoprazole (PROTONIX) tablet 40 mg  40 mg Oral ACB    [Held by provider] rivaroxaban (XARELTO) tablet 15 mg  15 mg Oral DAILY    therapeutic multivitamin (THERAGRAN) tablet 1 Tablet  1 Tablet Oral DAILY    acetaminophen (TYLENOL) tablet 650 mg  650 mg Oral Q4H PRN    Or    acetaminophen (TYLENOL) solution 650 mg  650 mg Per NG tube Q4H PRN    Or    acetaminophen (TYLENOL) suppository 650 mg  650 mg Rectal Q4H PRN    magnesium hydroxide (MILK OF MAGNESIA) 400 mg/5 mL oral suspension 30 mL  30 mL Oral DAILY PRN    guaiFENesin ER (MUCINEX) tablet 600 mg  600 mg Oral Q12H    benzonatate (TESSALON) capsule 100 mg  100 mg Oral TID PRN    acetaminophen (TYLENOL) tablet 500 mg  500 mg Oral Q6H PRN    insulin lispro (HUMALOG) injection   SubCUTAneous AC&HS    glucose chewable tablet 16 g  4 Tablet Oral PRN    glucagon (GLUCAGEN) injection 1 mg  1 mg IntraMUSCular PRN    dextrose 10% infusion 0-250 mL  0-250 mL IntraVENous PRN        Review of Systems  Constitutional: No fevers, No chills, No sweats, No fatigue, No Weakness  Eyes: No redness  Ears, nose, mouth, throat, and face: No nasal congestion, No sore throat, No voice change  Respiratory: No Shortness of Breath, No cough, No wheezing  Cardiovascular: No chest pain, No palpitations, No extremity edema  Gastrointestinal: No nausea, No vomiting, No diarrhea, No abdominal pain  Genitourinary: No frequency, No dysuria, No hematuria  Integument/breast: No skin lesion(s)   Neurological: has mild Confusion, No headaches, No dizziness      Objective:     Visit Vitals  /62   Pulse (!) 51   Temp 97.3 °F (36.3 °C)   Resp 18   Ht 5' 8\" (1.727 m)   Wt 61.2 kg (135 lb)   SpO2 90%   BMI 20.53 kg/m²      O2 Device: None (Room air)    Temp (24hrs), Av.8 °F (36.6 °C), Min:97.3 °F (36.3 °C), Max:98.2 °F (36.8 °C)      No intake/output data recorded. No intake/output data recorded. PHYSICAL EXAM:  Constitutional: No acute distress  Skin: Extremities and face reveal no rashes. HEENT: Sclerae anicteric. Extra-occular muscles are intact. No oral ulcers. The neck is supple and no masses. Cardiovascular: Regular rate and rhythm. Respiratory:  Clear breath sounds bilaterally with no wheezes, rales, or rhonchi. GI: Abdomen nondistended, soft, and nontender. Normal active bowel sounds. Musculoskeletal: No pitting edema of the lower legs.  Able to move all ext  Neurological:  Patient is alert and oriented X 2  Psychiatric: Mood appears appropriate       Data Review    Recent Results (from the past 24 hour(s))   URINALYSIS W/ REFLEX CULTURE    Collection Time: 23  2:54 PM    Specimen: Urine   Result Value Ref Range    Color YELLOW/STRAW      Appearance CLEAR CLEAR      Specific gravity 1.010 1.003 - 1.030      pH (UA) 5.5 5.0 - 8.0      Protein TRACE (A) NEG mg/dL    Glucose Negative NEG mg/dL    Ketone 15 (A) NEG mg/dL    Bilirubin Negative NEG      Blood Negative NEG      Urobilinogen 1.0 0.2 - 1.0 EU/dL    Nitrites Negative NEG      Leukocyte Esterase Negative NEG      UA:UC IF INDICATED CULTURE NOT INDICATED BY UA RESULT CNI      WBC 0-4 0 - 4 /hpf    RBC 0-5 0 - 5 /hpf    Epithelial cells MODERATE (A) FEW /lpf    Bacteria Negative NEG /hpf    Hyaline cast 0-2 0 - 2 /lpf   RESPIRATORY VIRUS PANEL W/COVID-19, PCR    Collection Time: 23  2:54 PM    Specimen: Nasopharyngeal   Result Value Ref Range Adenovirus Not detected NOTD      Coronavirus 229E Not detected NOTD      Coronavirus HKU1 Not detected NOTD      Coronavirus CVNL63 Not detected NOTD      Coronavirus OC43 Not detected NOTD      SARS-CoV-2, PCR Not detected NOTD      Metapneumovirus Not detected NOTD      Rhinovirus and Enterovirus Detected (A) NOTD      Influenza A Not detected NOTD      Influenza B Not detected NOTD      Parainfluenza 1 Not detected NOTD      Parainfluenza 2 Not detected NOTD      Parainfluenza 3 Not detected NOTD      Parainfluenza virus 4 Not detected NOTD      RSV by PCR Not detected NOTD      B. parapertussis, PCR Not detected NOTD      Bordetella pertussis - PCR Not detected NOTD      Chlamydophila pneumoniae DNA, QL, PCR Not detected NOTD      Mycoplasma pneumoniae DNA, QL, PCR Not detected NOTD     GLUCOSE, POC    Collection Time: 02/03/23  6:19 PM   Result Value Ref Range    Glucose (POC) 79 65 - 117 mg/dL    Performed by Isaiah Patino (KEVIN RN)    GLUCOSE, POC    Collection Time: 02/03/23 10:06 PM   Result Value Ref Range    Glucose (POC) 53 (LL) 65 - 117 mg/dL    Performed by [email or web address] [email or web address] PCT    GLUCOSE, POC    Collection Time: 02/03/23 10:11 PM   Result Value Ref Range    Glucose (POC) 71 65 - 117 mg/dL    Performed by Jero Inoue PCT    GLUCOSE, POC    Collection Time: 02/03/23 10:34 PM   Result Value Ref Range    Glucose (POC) 85 65 - 117 mg/dL    Performed by Jero Inoue PCT    AMMONIA    Collection Time: 02/03/23 10:46 PM   Result Value Ref Range    Ammonia, plasma 70 (H) <32 UMOL/L   VITAMIN B12    Collection Time: 02/03/23 10:46 PM   Result Value Ref Range    Vitamin B12 1,222 (H) 193 - 986 pg/mL   TSH 3RD GENERATION    Collection Time: 02/03/23 10:46 PM   Result Value Ref Range    TSH 2.95 0.36 - 3.74 uIU/mL   LIPID PANEL    Collection Time: 02/04/23  2:47 AM   Result Value Ref Range    Cholesterol, total 164 <200 MG/DL    Triglyceride 123 <150 MG/DL    HDL Cholesterol 43 MG/DL    LDL, calculated 96.4 0 - 100 MG/DL    VLDL, calculated 24.6 MG/DL    CHOL/HDL Ratio 3.8 0.0 - 5.0     HEMOGLOBIN A1C WITH EAG    Collection Time: 02/04/23  2:47 AM   Result Value Ref Range    Hemoglobin A1c 5.3 4.0 - 5.6 %    Est. average glucose 105 mg/dL   GLUCOSE, POC    Collection Time: 02/04/23  6:31 AM   Result Value Ref Range    Glucose (POC) 111 65 - 117 mg/dL    Performed by Amy Ramirez PCT    GLUCOSE, POC    Collection Time: 02/04/23 11:11 AM   Result Value Ref Range    Glucose (POC) 144 (H) 65 - 117 mg/dL    Performed by Kirill Wood PCT                  Time spent 35 minutes involving direct patient care as well as reviewing patient's labs and coordination of care with nursing staff     Care Plan discussed with: Patient/Family/RN/Case Management        Total time spent with patient: 35 minutes.

## 2023-02-04 NOTE — PROGRESS NOTES
Patient facial drop seems appears questionably greater than earlier. Son in room and states he sees no change in facial droop. Speech seems slurred with some words and not others but it was noted that speech was garbled all day yesterday and son states he sees no change in patent condition since yesterday. Dr Justin Lyman neurologist at bedside and aware of above.  He stated he would repeat CT in am

## 2023-02-04 NOTE — PROGRESS NOTES
End of Shift Note    Bedside shift change report given to Brandin Nicole (oncoming nurse) by Neelima Hooker RN (offgoing nurse). Report included the following information SBAR, Kardex, ED Summary, MAR, and Recent Results    Shift worked:  [id number]   Shift summary and any significant changes:     New ED admit at 1600. Patient in MRI now. Patient waxes and wanes between lethargic and alert. VSS. BG 79. Dr. Laine Gutierrez at bedside to examine pt       Concerns for physician to address: [facility]   Zone phone for oncoming shift:   [phone]     Patient Information  Austin Schultz  80 y.o.  2/3/2023  9:02 AM by Carla Cox MD. Austin Schultz was admitted from [facility]    Problem List  Patient Active Problem List    Diagnosis Date Noted    Stroke-like episode 02/03/2023     Past Medical History:   Diagnosis Date    Atrial fibrillation ([facility] [address]     CVA (cerebral vascular accident) ([facility] [address]     GERD (gastroesophageal reflux disease)     Glaucoma     HTN (hypertension)     Hyperlipidemia        Core Measures:  CVA: Yes Yes  CHF:No Not applicable  PNA:No Not applicable    Activity:  Activity Level: Bed Rest  Number times ambulated in hallways past shift: 0  Number of times OOB to chair past shift: 0    Cardiac:   Cardiac Monitoring: Yes      Cardiac Rhythm: Sinus Rhythm    Access:   Current line(s): PIV   Central Line? No Placement date  Reason Medically Necessary   PICC LINE? No Placement date Reason Medically Necessary     Genitourinary:   Urinary status: voiding  Urinary Catheter?  No Placement Date  Reason Medically Necessary     Respiratory:   O2 Device: None (Room air)  Chronic home O2 use?: NO  Incentive spirometer at bedside: N/A       GI:     Current diet:  ADULT DIET Easy to Chew  Passing flatus: YES  Tolerating current diet: TBD       Pain Management:   Patient states pain is manageable on current regimen: YES    Skin:     Interventions: float heels, PT/OT consult, limit briefs, and internal/external urinary devices    Patient Safety:  Fall Score:    Interventions: bed/chair alarm, gripper socks, and pt to call before getting OOB     @Rollbelt  @dexterity to release roll belt  Yes/No ( must document dexterity  here by stating Yes or No here, otherwise this is a restraint and must follow restraint documentation policy.)    DVT prophylaxis:  DVT prophylaxis Med- Yes  DVT prophylaxis SCD or JOSELO- No     Wounds: (If Applicable)  Wounds- No  Location     Active Consults:  IP CONSULT TO NEUROLOGY    Length of Stay:  Expected LOS: - - -  Actual LOS: 0  Discharge Plan: No TBAUDI Hooker RN

## 2023-02-04 NOTE — CONSULTS
Consult    NAME: Romeo Collier   :  1932   MRN:  [id number]     Date/Time:  2023 3:13 PM    Patient PCP: None  ________________________________________________________________________     Assessment:     - Recent CVA with hemorragic conversion  - AMS    - CAD with 1V CABG, stress in  no ischemia  - Echo 2023 EF 55%, mild AI, mild MR  - PAF with hx of bradycardia  - HTN  - Dyslipidemia  - Carotid 2023 mild  - Hypothyroid  - GERD    Cardiologist: Maricel Martinez        Plan:     - Negative enyzmes  - Euvolemic  - Paroxysmal afib, with maggie when in sinus    - Start xarelto when ok with neurology  - holding amiodarone today, resume at lower dose soon  - Hold metoprolol  - Hold amlodipine  - Hold losartan  - Stop fluids as tolerates po    Follow up Maricel Martinez to see if watchman candidate      [x]        High complexity decision making was performed        Subjective:   CHIEF COMPLAINT: Altered mental status    HISTORY OF PRESENT ILLNESS:       Romeo Collier is a 80 y.o.  female who presents with PMH of HTN, afib, CVA, DM, glaucoma, hypothyroidism, and GERD. Patient poor historian, alert and oriented to self and very nearly date with month, day, and \"\". Unaware of location. History collected from chart and son Jeremy Sung. Patient presenting from [facility] after episode of AMS this morning. Reportedly she fell from bed level yesterday and did not hit her head. Yesterday post fall she was fine at new baseline of bed bound with some dysarthria, however able to talk. This morning staff noted that she was altered by finding that she only responded to pain. Per son when he presented to ED, patient was back to her new baseline post CVA. Prior to stroke 3 weeks ago, she was living independently without a assisted walking device. She was driving and managing her ADLs independently with some short term memory difficulties.  Since 2023 CVA she has been in rehab with left sided hemiplegia and left sided facial droop. Pt is noted to have some congestion and cough during interview. Rapid COVID and flu test negative. ED Course  Afebrile, HR 59 /68 RR 15 SpO2 98% RA  Labs remarkable for Plt of 449, INR of 1.5, Cl 109, BUN 26, Albumin 2.7. HS troponin 62>65  Rapid COVID and flu negative. Blood culture and UA collected. EKG with afib  CT head/CTA with 1. Abnormal ring-enhancing appearance of the right caudate and putamen, possibly representing subacute ischemia though other etiologies are not excluded. Further evaluation with contrast-enhanced MRI recommended. 2.  No evidence of large vessel occlusion or significant flow-limiting stenosis. 3.  Mild prominence of the ventricular system may be related to ex vacuo  dilatation. Superimposed communicating hydrocephalus can be considered. In Room oriented to self  No chest pain  No dyspnea  No syncope    We were asked to consult for work up and evaluation of the above problems. Past Medical History:   Diagnosis Date    Atrial fibrillation ([address]     CVA (cerebral vascular accident) [address] [address]     GERD (gastroesophageal reflux disease)     Glaucoma     HTN (hypertension)     Hyperlipidemia       No past surgical history on file. No Known Allergies   Meds:  See below  Social History     Tobacco Use    Smoking status: Never    Smokeless tobacco: Never   Substance Use Topics    Alcohol use: Not Currently      Family History   Problem Relation Age of Onset    No Known Problems Mother     No Known Problems Father        REVIEW OF SYSTEMS:     [x]         Unable to obtain  ROS due to altered mental status   []         Total of 12 systems reviewed as follows:     Total of 12 systems reviewed as follows:       POSITIVE= Bold text  Negative = normal text  General:  fever, chills, sweats, generalized weakness, weight loss/gain,      loss of appetite   Eyes:    blurred vision, eye pain, loss of vision, double vision  ENT:    rhinorrhea, pharyngitis   Respiratory:   cough, sputum production, SOB, MARTIN, wheezing, pleuritic pain   Cardiology:   chest pain, palpitations, orthopnea, PND, edema, syncope   Gastrointestinal:  abdominal pain , N/V, diarrhea, dysphagia, constipation, bleeding   Genitourinary:  frequency, urgency, dysuria, hematuria, incontinence   Muskuloskeletal :  arthralgia, myalgia, back pain  Hematology:  easy bruising, nose or gum bleeding, lymphadenopathy   Dermatological: rash, ulceration, pruritis, color change / jaundice  Endocrine:   hot flashes or polydipsia   Neurological:  headache, dizziness, confusion, focal weakness, paresthesia,     Speech difficulties, memory loss, gait difficulty  Psychological: Feelings of anxiety, depression, agitation    Objective:      Physical Exam:    Last 24hrs VS reviewed since prior progress note. Most recent are:    Visit Vitals  /62   Pulse (!) 51   Temp 97.3 °F (36.3 °C)   Resp 18   Ht 5' 8\" (1.727 m)   Wt 60.7 kg (133 lb 12.8 oz)   SpO2 90%   BMI 20.34 kg/m²       Intake/Output Summary (Last 24 hours) at 2/4/2023 1513  Last data filed at 2/4/2023 1330  Gross per 24 hour   Intake 200 ml   Output --   Net 200 ml        General Appearance: Well developed, well nourished, oriented to self,    no acute distress. Ears/Nose/Mouth/Throat: Pupils equal and round, Hearing grossly normal.  Neck: Supple. JVP within normal limits. Carotids good upstrokes, with no bruit. Chest: Lungs clear to auscultation bilaterally. Cardiovascular: Regular rate and rhythm, S1S2 normal, no murmur, rubs, gallops. Abdomen: Soft, non-tender, bowel sounds are active. No organomegaly. Extremities: No edema bilaterally. Femoral pulses +1, Distal Pulses +1. Skin: Warm and dry. Neuro: poorly cooperative    Data:      Prior to Admission medications    Medication Sig Start Date End Date Taking? Authorizing Provider   acetaminophen (Tylenol 8 Hour) 650 mg TbER Take 650 mg by mouth every eight (8) hours.    Yes Provider, Historical   amiodarone (CORDARONE) 200 mg tablet Take 200 mg by mouth two (2) times a day. Yes Provider, Historical   aspirin 81 mg chewable tablet Take 81 mg by mouth daily. Yes Provider, Historical   latanoprost (XALATAN) 0.005 % ophthalmic solution Administer 1 Drop to both eyes nightly. Yes Provider, Historical   levothyroxine (SYNTHROID) 75 mcg tablet Take 75 mcg by mouth Daily (before breakfast). Yes Provider, Historical   losartan (COZAAR) 100 mg tablet Take 100 mg by mouth daily. Yes Provider, Historical   metoprolol tartrate (LOPRESSOR) 25 mg tablet Take 25 mg by mouth two (2) times a day. Yes Provider, Historical   multivitamin (ONE A DAY) tablet Take 1 Tablet by mouth daily. Yes Provider, Historical   amLODIPine (Norvasc) 5 mg tablet Take 5 mg by mouth daily. Yes Provider, Historical   Omeprazole delayed release (PRILOSEC D/R) 20 mg tablet Take 20 mg by mouth daily. Yes Provider, Historical   timolol (TIMOPTIC) 0.5 % ophthalmic solution 1 Drop two (2) times a day. Yes Provider, Historical   rivaroxaban (Xarelto) 20 mg tab tablet Take 20 mg by mouth every evening.    Yes Provider, Historical       Recent Results (from the past 24 hour(s))   GLUCOSE, POC    Collection Time: 02/03/23  6:19 PM   Result Value Ref Range    Glucose (POC) 79 65 - 117 mg/dL    Performed by Sirena Thomas (KEVIN RN)    GLUCOSE, POC    Collection Time: 02/03/23 10:06 PM   Result Value Ref Range    Glucose (POC) 53 (LL) 65 - 117 mg/dL    Performed by Adán Fore PCT    GLUCOSE, POC    Collection Time: 02/03/23 10:11 PM   Result Value Ref Range    Glucose (POC) 71 65 - 117 mg/dL    Performed by Adán Fore PCT    GLUCOSE, POC    Collection Time: 02/03/23 10:34 PM   Result Value Ref Range    Glucose (POC) 85 65 - 117 mg/dL    Performed by [facility]    AMMONIA    Collection Time: 02/03/23 10:46 PM   Result Value Ref Range    Ammonia, plasma 70 (H) <32 UMOL/L   VITAMIN B12    Collection Time: 02/03/23 10:46 PM   Result Value Ref Range    Vitamin B12 1,222 (H) 193 - 986 pg/mL   TSH 3RD GENERATION    Collection Time: 02/03/23 10:46 PM   Result Value Ref Range    TSH 2.95 0.36 - 3.74 uIU/mL   LIPID PANEL    Collection Time: 02/04/23  2:47 AM   Result Value Ref Range    Cholesterol, total 164 <200 MG/DL    Triglyceride 123 <150 MG/DL    HDL Cholesterol 43 MG/DL    LDL, calculated 96.4 0 - 100 MG/DL    VLDL, calculated 24.6 MG/DL    CHOL/HDL Ratio 3.8 0.0 - 5.0     HEMOGLOBIN A1C WITH EAG    Collection Time: 02/04/23  2:47 AM   Result Value Ref Range    Hemoglobin A1c 5.3 4.0 - 5.6 %    Est. average glucose 105 mg/dL   GLUCOSE, POC    Collection Time: 02/04/23  6:31 AM   Result Value Ref Range    Glucose (POC) 111 65 - 117 mg/dL    Performed by Alfa Arambula PCT    GLUCOSE, POC    Collection Time: 02/04/23 11:11 AM   Result Value Ref Range    Glucose (POC) 144 (H) 65 - 117 mg/dL    Performed by Alida Bosworth PCT         RYLEY SMITH  80year old F (06/14/1932)  Account #: [de-identified]  PRIMARY CARE PHYSICIAN:  Miriam Benton MD  CARDIOLOGIST:  Cookie Zapata MD    REASON FOR VISIT:  This 80year old female presents for AFib. HISTORY OF PRESENT ILLNESS:   ARRYTHMIA DIAGNOSES:  1. Paroxysmal atrial fibrillation, on Xarelto, Amiodarone. 2.  Bradycardia reported to 40s, Metoprolol stopped 12/20/17    CARDIOVASCULAR DIAGNOSES:  1. Status post coronary artery bypass grafting times one vessel in January 2010 (left anterior descending). Lexiscan Cardiolite stress test February 2011 showed ejection fraction of 64% with no evidence of inducible ischemia. 2. Evaluation also showed mild AI with normal left ventricular systolic function, mildly dilated aortic root, moderate MR.     OTHER DIAGNOSES:  1. Hypertension. 2. Dyslipidemia. 3. Mildly prolonged QT interval.  4. Hypothyroidism. 5. Gastroesophageal reflux disease.     REASON FOR VISIT:  Ms. Lauren Palmer is an 59-year-old pleasant lady who presents today for Afib followup. The patient states that she has been doing well since her last visit apart from little aches and pains. She reports no particular problems from a cardiac standpoint. Denies any chest pain, sustained palpitations, syncope/presyncope. The patient is currently on amiodarone 100 mg a day and ASA 81 mg a day. BP today is 109/67. 12-lead EKG today reveals normal sinus rhythm at 60 bpm.    EKG today reveals normal sinus rhythm. Continues to do very well. No signs or symptoms to signify recurrence of atrial fibrillation. At this point, we will not change anything. Continue with current medical therapy. Follow up with NP in 6 months. Followup with me in one year. Amiodarone maintenance diagnostics were discussed. We will order chest x-ray. CARDIAC HISTORY  CAD:  1 1V CABG - 1/2010     ARRHYTHMIA:  1 PAF   2 Mildly prolonged QT interval   3 Nonsustained VT     VALVULAR:  1 Moderate MR, Mild AI   2 Mild TR, Mild AR     RISK FACTORS:  1 Hypertension   2 Dyslipidemia   3 Family History of CAD [Less than 61years of age]       CARDIOVASCULAR PROCEDURES  Procedure Date Results   [facility] MPI 04/07/2016 EF 0.76 (76%), Normal   [facility] MPI 08/08/2012 EF 0.70, Normal rest and stress myocardial perfusion images. The gated study shows septal hypokinesis probably related to prior CABG   CV Surgery  1V CABG: LAD   Echo 03/17/2016 EF: .65, Normal left ventricular size and function. Concentric left ventricular hypertrophy. Diastolic dysfunction. Mild to moderate aortic regurgitation. Mild mitral regurgitation. Echo 08/25/2011 Concentric left ventricular hypertrophy. Normal left ventricular function with ejection fraction at 65-70%. Diastolic dysfunction. Left atrial enlargement. Mild aortic, mitral, and tricuspid regurgitation. No pericardial effusion.    EKG 10/18/2021 Sinus Curtis, HR 58, Nonspecific ST Twave changes   Holter 03/17/2016 INAL IMPRESSION  w    Baseline Rhythm:  Sinus Bradycardia  w    Rare PACs  w    Rare PVCs  w    average HR 51 bpm.  Max HR 85 bpm. No sx's that would relate to cardiac source. No afib. Holter 2010 Only two strips were submitted for interpretation. At baseline she has sinus bradycardia at 55 to 57 beats per minute. Ten days later she had a routine recording that showed sinus rhythm at 60 beats per minute. No other strips were submitted for interpretation. MPI 2011 EF 0.06, Normal myocardial perfusion imaging study. PFTs 2016 the diffision capacity is moderately reduced but corrects when considering alveolar volumr       ACTIVE ALLERGIES:  Ingredient Reaction Comment   EZETIMIBE     PENICILLINS  Penicillins   STATINS-HMG-COA REDUCTASE INHIBITORS Intolerant      ACTIVE INTOLERANCES:  Description Reaction Comment   EZETIMIBE       PROBLEM LIST:  Problem Description Chronic   Hypercholesterolemia Y   HTN Y   CAD Y   AFIB Y       PAST MEDICAL/SURGICAL HISTORY  (Detailed)    Disease/disorder Onset Date Surgical History Date Comments     CABG     Bradyarrhymias       CAD       GERD       Hyperlipidemia       Hypertension       Hypothyroidism       Paroxysmal afib       Valvular disease         Family History  (Detailed)  Relationship Family Member Name  Age at Death Condition Onset Age Cause of Death   Father  Y  Myocardial Infarction  Y   Mother  N  Myocardial Infarction  N   Mother  N  Diabetes mellitus  N   Mother  N  unknown cause per pt at age 80  N   Mother  N  Hypertension  N   Sister  Y  heart condition per pt  Y   Sister  N  Hypertension  N   Sister  N  Diabetes mellitus  N     SOCIAL HISTORY  (Detailed)  Tobacco use reviewed. Preferred language is [place]. EDUCATION/EMPLOYMENT/OCCUPATION  Employment History Status Retired Restrictions                                 MARITAL STATUS/FAMILY/SOCIAL SUPPORT  Currently . 0 son(s). 0 daughter(s). Smoking status: Former smoker.       SMOKING STATUS  Use Status Type Smoking Status Usage Per Day Years Used Total Pack Years   yes  Former smoker        TOBACCO CESSATION INFORMATION  Date Counseled By Order Status Description Code Tobacco Cessation Information   05/27/2016      Smoking effects education   12/02/2016      Smoking effects education   06/16/2017      Smoking effects education         ALCOHOL  There is a history of alcohol use. consumed rarely. CAFFEINE  The patient does not use caffeine. Piedad Dao REVIEW OF SYMPTOMS:    RESP - Negative for dyspnea. CARD - Negative for chest pain, palpitation, syncope. VASC - Negative for edema. NEURO - Negative for dizziness. VITAL SIGNS  Time BP mm/Hg Pulse /min Resp /min Temp F Ht ft Ht in Ht cm Wt lb Wt kg BMI kg/m2 BSA m2 O2 Sat%   2:58 /67 60   5.0 5.00 165.10 135.00 61.235 22.46         PHYSICAL EXAM:  Exam Findings Details   Const Neg Level of Distress - Awake / Alert. Nourishment - Well Nourished. Appearance - Well Developed. Resp Neg Respirations - Nonlabored. Breath Sounds - Clear Throughout. Rales - Absent. Wheezes - Absent. Rhonchi - Absent. Cardiac Neg Rhythm - Regular. Palpation - PMI Normal.  Heart Sounds - S1 Normal, S2 Normal, No S3, No S4. Extra Sounds - None. Murmurs - None. Vasc Neg Carotid - Bilateral Normal Pulse. Dorsalis Pedis - Bilateral Normal Pulse. Vasc Pos Aorta - Nonpalpable. Abd Neg Tenderness - None. EXT Neg Clubbing - Absent. Lower Extremity Edema - Absent. Psych Neg Orientation - Oriented to Time, Person, Place. IMPRESSION AND PLAN  01. Paroxysmal atrial fibrillation (I48.0):  12-lead EKG today reveals normal sinus rhythm at 60 bpm.    EKG today reveals normal sinus rhythm. Continues to do very well. No signs or symptoms to signify recurrence of atrial fibrillation. At this point, we will not change anything. Continue with current medical therapy. Follow up with NP in 6 months. Followup with me in one year. Amiodarone maintenance diagnostics were discussed.   We will order chest x-ray. ECG done Return for follow-up visit in one year. .  Return for follow-up with Nurse Practitioner in six months. PA / Lat CXR to be done   02. Essential (primary) hypertension (I10):  Reasonably well controlled. 03. Personal history of nicotine dependence (H47.414):  remains abstinent from tobacco use. 04. Body mass index [BMI] 22.0-22.9, adult (Z68.22)       Total time spent >30 minutes on reviewing previous tests/charts, discussing test results with the patient, performing a medically necessary appropriate examination, orders, counseling/educating the patient, documenting clinical information in the EHR. ORDERS:   Dietary management education, guidance, and counseling    1 ECG done    3 Return for follow-up visit in one year. 4 Return for follow-up with Nurse Practitioner in six months. 5 PA / Lat Chest X-Ray.         FINAL MEDICATION LIST  Medication Sig Desc Non-VCS   AMIODARONE 200MG TABLETS TAKE 1/2 TABLET( 100 MG) BY MOUTH EVERY DAY N   amlodipine 5 mg tablet take 1 tablet (5MG)  by oral route  every day N   aspirin 81 mg Tab take 1 tablet (81MG)  by oral route  every day N   Fish Oil 1,000 mg capsule take 1 by Oral route  every day Y   Glucosamine 500 mg tablet  Y   Levothroid 75 mcg tablet take 1 tablet (75MCG)  by oral route  every day Y   losartan 100 mg-hydrochlorothiazide 25 mg tablet take 1 tablet by oral route  every day N   multivitamin capsule take 1 capsule by oral route  every day Y   omeprazole 40 mg capsule,delayed release take 1 capsule (40MG)  by oral route  every day before a meal Y   tramadol 50 mg tablet take 1 tablet (50MG)  by oral route  every 6 hours as needed Y

## 2023-02-04 NOTE — PROGRESS NOTES
End of Shift Note    Bedside shift change report given to [employer] (oncoming nurse) by Elena Galan RN (offgoing nurse). Report included the following information SBAR, Kardex, ED Summary, MAR, and Recent Results    Shift worked:  [id number]   Shift summary and any significant changes:     Seen by Neurology. Has not voided. Bladder scanned for 296cc. IV fluids started. Cardiology consult called for maggie cardia. Turned and repositioned every 2 hours       Concerns for physician to address:     Zone phone for oncoming shift:   [phone]     Patient Information  Verna Miguel  80 y.o.  2/3/2023  9:02 AM by Julia Morgan MD. Verna Miguel was admitted from [facility]    Problem List  Patient Active Problem List    Diagnosis Date Noted    Stroke-like episode 02/03/2023     Past Medical History:   Diagnosis Date    Atrial fibrillation ([facility] [address]     CVA (cerebral vascular accident) ([facility] [address]     GERD (gastroesophageal reflux disease)     Glaucoma     HTN (hypertension)     Hyperlipidemia        Core Measures:  CVA: Yes Yes  CHF:No Not applicable  PNA:No Not applicable    Activity:  Activity Level: Bed Rest  Number times ambulated in hallways past shift: 0  Number of times OOB to chair past shift: 0    Cardiac:   Cardiac Monitoring: Yes      Cardiac Rhythm: Sinus Rhythm    Access:   Current line(s): PIV   Central Line? No Placement date  Reason Medically Necessary   PICC LINE? No Placement date Reason Medically Necessary     Genitourinary:   Urinary status: has not voided this shift. Urinary Catheter?  No Placement Date  Reason Medically Necessary     Respiratory:   O2 Device: None (Room air)  Chronic home O2 use?: NO  Incentive spirometer at bedside: N/A       GI:     Current diet:  DIET ONE TIME MESSAGE  ADULT DIET Dysphagia - Pureed  Passing flatus: YES  Tolerating current diet: TBD       Pain Management:   Patient states pain is manageable on current regimen: YES    Skin:  Mikey Score: 16  Interventions: float heels, PT/OT consult, limit briefs, and internal/external urinary devices    Patient Safety:  Fall Score:    Interventions: bed/chair alarm, gripper socks, and pt to call before getting OOB     @Rollbelt  @dexterity to release roll belt  Yes/No ( must document dexterity  here by stating Yes or No here, otherwise this is a restraint and must follow restraint documentation policy.)    DVT prophylaxis:  DVT prophylaxis Med- no  DVT prophylaxis SCD or JOSELO- No     Wounds: (If Applicable)  Wounds- No  Location     Active Consults:  IP CONSULT TO NEUROLOGY  IP CONSULT TO CARDIOLOGY    Length of Stay:  Expected LOS: - - -  Actual LOS: 1  Discharge Plan: No TBD      Chen Nguyen RN

## 2023-02-05 ENCOUNTER — APPOINTMENT (OUTPATIENT)
Dept: CT IMAGING | Age: 88
DRG: 064 | End: 2023-02-05
Attending: PSYCHIATRY & NEUROLOGY
Payer: MEDICARE

## 2023-02-05 LAB
ANION GAP SERPL CALC-SCNC: 5 MMOL/L (ref 5–15)
BASOPHILS # BLD: 0 K/UL (ref 0–0.1)
BASOPHILS NFR BLD: 1 % (ref 0–1)
BUN SERPL-MCNC: 25 MG/DL (ref 6–20)
BUN/CREAT SERPL: 33 (ref 12–20)
CALCIUM SERPL-MCNC: 9.1 MG/DL (ref 8.5–10.1)
CHLORIDE SERPL-SCNC: 112 MMOL/L (ref 97–108)
CO2 SERPL-SCNC: 25 MMOL/L (ref 21–32)
CREAT SERPL-MCNC: 0.76 MG/DL (ref 0.55–1.02)
DIFFERENTIAL METHOD BLD: ABNORMAL
EOSINOPHIL # BLD: 0.1 K/UL (ref 0–0.4)
EOSINOPHIL NFR BLD: 3 % (ref 0–7)
ERYTHROCYTE [DISTWIDTH] IN BLOOD BY AUTOMATED COUNT: 11.5 % (ref 11.5–14.5)
GLUCOSE BLD STRIP.AUTO-MCNC: 107 MG/DL (ref 65–117)
GLUCOSE BLD STRIP.AUTO-MCNC: 116 MG/DL (ref 65–117)
GLUCOSE BLD STRIP.AUTO-MCNC: 90 MG/DL (ref 65–117)
GLUCOSE BLD STRIP.AUTO-MCNC: 91 MG/DL (ref 65–117)
GLUCOSE SERPL-MCNC: 124 MG/DL (ref 65–100)
HCT VFR BLD AUTO: 35.1 % (ref 35–47)
HGB BLD-MCNC: 12.2 G/DL (ref 11.5–16)
IMM GRANULOCYTES # BLD AUTO: 0 K/UL (ref 0–0.04)
IMM GRANULOCYTES NFR BLD AUTO: 1 % (ref 0–0.5)
LYMPHOCYTES # BLD: 0.8 K/UL (ref 0.8–3.5)
LYMPHOCYTES NFR BLD: 20 % (ref 12–49)
MCH RBC QN AUTO: 33.6 PG (ref 26–34)
MCHC RBC AUTO-ENTMCNC: 34.8 G/DL (ref 30–36.5)
MCV RBC AUTO: 96.7 FL (ref 80–99)
MONOCYTES # BLD: 0.4 K/UL (ref 0–1)
MONOCYTES NFR BLD: 9 % (ref 5–13)
NEUTS SEG # BLD: 2.8 K/UL (ref 1.8–8)
NEUTS SEG NFR BLD: 66 % (ref 32–75)
NRBC # BLD: 0 K/UL (ref 0–0.01)
NRBC BLD-RTO: 0 PER 100 WBC
PLATELET # BLD AUTO: 374 K/UL (ref 150–400)
PMV BLD AUTO: 9.4 FL (ref 8.9–12.9)
POTASSIUM SERPL-SCNC: 3.9 MMOL/L (ref 3.5–5.1)
RBC # BLD AUTO: 3.63 M/UL (ref 3.8–5.2)
SERVICE CMNT-IMP: NORMAL
SODIUM SERPL-SCNC: 142 MMOL/L (ref 136–145)
WBC # BLD AUTO: 4.2 K/UL (ref 3.6–11)

## 2023-02-05 PROCEDURE — 74011250637 HC RX REV CODE- 250/637: Performed by: STUDENT IN AN ORGANIZED HEALTH CARE EDUCATION/TRAINING PROGRAM

## 2023-02-05 PROCEDURE — 74011250636 HC RX REV CODE- 250/636: Performed by: INTERNAL MEDICINE

## 2023-02-05 PROCEDURE — 80048 BASIC METABOLIC PNL TOTAL CA: CPT

## 2023-02-05 PROCEDURE — 74011250637 HC RX REV CODE- 250/637: Performed by: INTERNAL MEDICINE

## 2023-02-05 PROCEDURE — 70450 CT HEAD/BRAIN W/O DYE: CPT

## 2023-02-05 PROCEDURE — 99232 SBSQ HOSP IP/OBS MODERATE 35: CPT | Performed by: PSYCHIATRY & NEUROLOGY

## 2023-02-05 PROCEDURE — 65270000046 HC RM TELEMETRY

## 2023-02-05 PROCEDURE — 85025 COMPLETE CBC W/AUTO DIFF WBC: CPT

## 2023-02-05 PROCEDURE — 36415 COLL VENOUS BLD VENIPUNCTURE: CPT

## 2023-02-05 PROCEDURE — 82962 GLUCOSE BLOOD TEST: CPT

## 2023-02-05 RX ADMIN — ASPIRIN 81 MG: 81 TABLET, CHEWABLE ORAL at 09:04

## 2023-02-05 RX ADMIN — TIMOLOL MALEATE 1 DROP: 5 SOLUTION OPHTHALMIC at 22:40

## 2023-02-05 RX ADMIN — THERA TABS 1 TABLET: TAB at 09:04

## 2023-02-05 RX ADMIN — LEVOTHYROXINE SODIUM 75 MCG: 0.07 TABLET ORAL at 09:04

## 2023-02-05 RX ADMIN — GUAIFENESIN 600 MG: 600 TABLET, EXTENDED RELEASE ORAL at 09:04

## 2023-02-05 RX ADMIN — ACETAMINOPHEN 325MG 650 MG: 325 TABLET ORAL at 12:20

## 2023-02-05 RX ADMIN — TIMOLOL MALEATE 1 DROP: 5 SOLUTION OPHTHALMIC at 09:06

## 2023-02-05 RX ADMIN — SODIUM CHLORIDE 50 ML/HR: 9 INJECTION, SOLUTION INTRAVENOUS at 12:15

## 2023-02-05 RX ADMIN — LACTULOSE 15 ML: 20 SOLUTION ORAL at 17:23

## 2023-02-05 RX ADMIN — LATANOPROST 1 DROP: 50 SOLUTION OPHTHALMIC at 22:57

## 2023-02-05 RX ADMIN — PANTOPRAZOLE SODIUM 40 MG: 40 TABLET, DELAYED RELEASE ORAL at 09:04

## 2023-02-05 RX ADMIN — LACTULOSE 15 ML: 20 SOLUTION ORAL at 09:07

## 2023-02-05 RX ADMIN — LACTULOSE 15 ML: 20 SOLUTION ORAL at 22:49

## 2023-02-05 NOTE — PROGRESS NOTES
Hospitalist Progress Note    Subjective:   Daily Progress Note: 2/5/2023 2:24 PM    Hospital Course:  Benjy De Paz is a 80 y.o.  female who presents with PMH of HTN, afib, CVA, DM, glaucoma, hypothyroidism, and GERD. Patient presenting from [facility] after episode of AMS this morning. Reportedly she fell from bed level yesterday and did not hit her head. Yesterday post fall she was fine at new baseline of bed bound with some dysarthria, however able to talk. This morning staff noted that she was altered by finding that she only responded to pain. Per son when he presented to ED, patient was back to her new baseline post CVA. Prior to stroke 3 weeks ago, she was living independently without a assisted walking device. She was driving and managing her ADLs independently with some short term memory difficulties. Since 1/2023 CVA she has been in rehab with left sided hemiplegia and left sided facial droop. MRI brain shows subacute hemorrhage associated with subacute infarct in right corona radiata/basal ganglia. Subjective:  Patient was sleepy this morning when I saw the patient, however, responding to questions asked. As per RN, patient had breakfast before I saw her this morning. Assessment / Plan:  Acute encephalopathy:  Hemorrhagic transformation of recent stroke  Transient  MRI brain shows subacute hemorrhage associated with subacute infarct in right corona radiata/basal ganglia. Repeat CT head today  Repeat CT head in 1-2 week as outpatient. Neurology recommends to be off for xarelto for 4-6 weeks. Mental status now improved  Apparently there was concern for slurring of speech on 2/4/23.  Repeat CT head to be done today    Cough:  Rhino and enterovirus +  Mucinex    Hypertension  Afib:  Hold xarelto in the setting of subacute hemorrhage  Hold amiodarone, amlodipine, losartan and metoprolol  Start amiodarone at lower dose soon  Appreciate cardiology consult  Outpatient cardiology follow up for watchman evaluation. Diabetes mellitus:  Continue sliding scale    Glaucoma:  Continue eye drops        18.5 - 24.9 Normal weight / Body mass index is 20.34 kg/m². Code status: Full  Prophylaxis: SCD's  Recommended Disposition: SNF/LTC  AJCKY: 2/6/23  Barrier: repeat CT head, placement.        Current Facility-Administered Medications   Medication Dose Route Frequency    lactulose (CHRONULAC) 10 gram/15 mL solution 15 mL  10 g Oral TID    melatonin tablet 3 mg  3 mg Oral QHS PRN    sodium chloride (NS) flush 5-10 mL  5-10 mL IntraVENous PRN    [Held by provider] amiodarone (CORDARONE) tablet 200 mg  200 mg Oral BID    aspirin chewable tablet 81 mg  81 mg Oral DAILY    latanoprost (XALATAN) 0.005 % ophthalmic solution 1 Drop  1 Drop Both Eyes QHS    levothyroxine (SYNTHROID) tablet 75 mcg  75 mcg Oral ACB    [Held by provider] losartan (COZAAR) tablet 100 mg  100 mg Oral DAILY    [Held by provider] metoprolol tartrate (LOPRESSOR) tablet 25 mg  25 mg Oral BID    [Held by provider] amLODIPine (NORVASC) tablet 5 mg  5 mg Oral DAILY    timolol (TIMOPTIC) 0.5 % ophthalmic solution 1 Drop  1 Drop Both Eyes BID    pantoprazole (PROTONIX) tablet 40 mg  40 mg Oral ACB    [Held by provider] rivaroxaban (XARELTO) tablet 15 mg  15 mg Oral DAILY    therapeutic multivitamin (THERAGRAN) tablet 1 Tablet  1 Tablet Oral DAILY    acetaminophen (TYLENOL) tablet 650 mg  650 mg Oral Q4H PRN    Or    acetaminophen (TYLENOL) solution 650 mg  650 mg Per NG tube Q4H PRN    Or    acetaminophen (TYLENOL) suppository 650 mg  650 mg Rectal Q4H PRN    magnesium hydroxide (MILK OF MAGNESIA) 400 mg/5 mL oral suspension 30 mL  30 mL Oral DAILY PRN    guaiFENesin ER (MUCINEX) tablet 600 mg  600 mg Oral Q12H    benzonatate (TESSALON) capsule 100 mg  100 mg Oral TID PRN    acetaminophen (TYLENOL) tablet 500 mg  500 mg Oral Q6H PRN    insulin lispro (HUMALOG) injection   SubCUTAneous AC&HS    glucose chewable tablet 16 g  4 Tablet Oral PRN    glucagon (GLUCAGEN) injection 1 mg  1 mg IntraMUSCular PRN    dextrose 10% infusion 0-250 mL  0-250 mL IntraVENous PRN        Review of Systems  Constitutional: No fevers, No chills, No sweats, No fatigue, No Weakness  Eyes: No redness  Ears, nose, mouth, throat, and face: No nasal congestion, No sore throat, No voice change  Respiratory: No Shortness of Breath, No cough, No wheezing  Cardiovascular: No chest pain, No palpitations, No extremity edema  Gastrointestinal: No nausea, No vomiting, No diarrhea, No abdominal pain  Genitourinary: No frequency, No dysuria, No hematuria  Integument/breast: No skin lesion(s)   Neurological: has mild Confusion, No headaches, No dizziness      Objective:     Visit Vitals  /76   Pulse 62   Temp 97.3 °F (36.3 °C)   Resp 16   Ht 5' 8\" (1.727 m)   Wt 60.7 kg (133 lb 12.8 oz)   SpO2 100%   BMI 20.34 kg/m²      O2 Device: None (Room air)    Temp (24hrs), Av.6 °F (36.4 °C), Min:97.3 °F (36.3 °C), Max:97.9 °F (36.6 °C)      No intake/output data recorded.  1901 -  0700  In: 440 [P.O.:440]  Out: 300 [Urine:300]    PHYSICAL EXAM:  Constitutional: No acute distress  Skin: Extremities and face reveal no rashes. HEENT: Sclerae anicteric. Extra-occular muscles are intact. No oral ulcers. The neck is supple and no masses. Cardiovascular: Regular rate and rhythm. Respiratory:  Clear breath sounds bilaterally with no wheezes, rales, or rhonchi. GI: Abdomen nondistended, soft, and nontender. Normal active bowel sounds. Musculoskeletal: No pitting edema of the lower legs. Able to move all ext  Neurological:  Patient was sleepy this morning.    Psychiatric: Mood appears appropriate       Data Review    Recent Results (from the past 24 hour(s))   GLUCOSE, POC    Collection Time: 23  4:33 PM   Result Value Ref Range    Glucose (POC) 62 (L) 65 - 117 mg/dL    Performed by [address], POC    Collection Time: 23  5:00 PM   Result Value Ref Range    Glucose (POC) 92 65 - 117 mg/dL    Performed by [employer]    GLUCOSE, POC    Collection Time: 02/04/23  5:26 PM   Result Value Ref Range    Glucose (POC) 97 65 - 117 mg/dL    Performed by [employer]    GLUCOSE, POC    Collection Time: 02/04/23  5:56 PM   Result Value Ref Range    Glucose (POC) 83 65 - 117 mg/dL    Performed by Venus Dryer    GLUCOSE, POC    Collection Time: 02/04/23  6:00 PM   Result Value Ref Range    Glucose (POC) 123 (H) 65 - 117 mg/dL    Performed by [employer] Dryer    GLUCOSE, POC    Collection Time: 02/04/23  9:17 PM   Result Value Ref Range    Glucose (POC) 104 65 - 117 mg/dL    Performed by Ana Cotton PCT    CBC WITH AUTOMATED DIFF    Collection Time: 02/05/23  3:33 AM   Result Value Ref Range    WBC 4.2 3.6 - 11.0 K/uL    RBC 3.63 (L) 3.80 - 5.20 M/uL    HGB 12.2 11.5 - 16.0 g/dL    HCT 35.1 35.0 - 47.0 %    MCV 96.7 80.0 - 99.0 FL    MCH 33.6 26.0 - 34.0 PG    MCHC 34.8 30.0 - 36.5 g/dL    RDW 11.5 11.5 - 14.5 %    PLATELET 315 607 - 569 K/uL    MPV 9.4 8.9 - 12.9 FL    NRBC 0.0 0  WBC    ABSOLUTE NRBC 0.00 0.00 - 0.01 K/uL    NEUTROPHILS 66 32 - 75 %    LYMPHOCYTES 20 12 - 49 %    MONOCYTES 9 5 - 13 %    EOSINOPHILS 3 0 - 7 %    BASOPHILS 1 0 - 1 %    IMMATURE GRANULOCYTES 1 (H) 0.0 - 0.5 %    ABS. NEUTROPHILS 2.8 1.8 - 8.0 K/UL    ABS. LYMPHOCYTES 0.8 0.8 - 3.5 K/UL    ABS. MONOCYTES 0.4 0.0 - 1.0 K/UL    ABS. EOSINOPHILS 0.1 0.0 - 0.4 K/UL    ABS. BASOPHILS 0.0 0.0 - 0.1 K/UL    ABS. IMM.  GRANS. 0.0 0.00 - 0.04 K/UL    DF AUTOMATED     METABOLIC PANEL, BASIC    Collection Time: 02/05/23  3:33 AM   Result Value Ref Range    Sodium 142 136 - 145 mmol/L    Potassium 3.9 3.5 - 5.1 mmol/L    Chloride 112 (H) 97 - 108 mmol/L    CO2 25 21 - 32 mmol/L    Anion gap 5 5 - 15 mmol/L    Glucose 124 (H) 65 - 100 mg/dL    BUN 25 (H) 6 - 20 MG/DL    Creatinine 0.76 0.55 - 1.02 MG/DL    BUN/Creatinine ratio 33 (H) 12 - 20      eGFR >60 >60 ml/min/1.73m2    Calcium 9.1 8.5 - 10.1 MG/DL   GLUCOSE, POC    Collection Time: 02/05/23  6:26 AM   Result Value Ref Range    Glucose (POC) 107 65 - 117 mg/dL    Performed by [facility] PCT    GLUCOSE, POC    Collection Time: 02/05/23 10:50 AM   Result Value Ref Range    Glucose (POC) 116 65 - 117 mg/dL    Performed by Maryam Martinez PCT                  Time spent 35 minutes involving direct patient care as well as reviewing patient's labs and coordination of care with nursing staff     Care Plan discussed with: Patient/Family/RN/Case Management        Total time spent with patient: 35 minutes.

## 2023-02-05 NOTE — PROGRESS NOTES
Problem: Patient Education: Go to Patient Education Activity  Goal: Patient/Family Education  Outcome: Progressing Towards Goal     Problem: TIA/CVA Stroke: 0-24 hours  Goal: Off Pathway (Use only if patient is Off Pathway)  Outcome: Progressing Towards Goal  Goal: Activity/Safety  Outcome: Progressing Towards Goal  Goal: Consults, if ordered  Outcome: Progressing Towards Goal  Goal: Diagnostic Test/Procedures  Outcome: Progressing Towards Goal  Goal: Nutrition/Diet  Outcome: Progressing Towards Goal  Goal: Discharge Planning  Outcome: Progressing Towards Goal  Goal: Medications  Outcome: Progressing Towards Goal  Goal: Respiratory  Outcome: Progressing Towards Goal  Goal: Treatments/Interventions/Procedures  Outcome: Progressing Towards Goal  Goal: Minimize risk of bleeding post-thrombolytic infusion  Outcome: Progressing Towards Goal  Goal: Monitor for complications post-thrombolytic infusion  Outcome: Progressing Towards Goal  Goal: Psychosocial  Outcome: Progressing Towards Goal  Goal: *Hemodynamically stable  Outcome: Progressing Towards Goal  Goal: *Neurologically stable  Description: Absence of additional neurological deficits    Outcome: Progressing Towards Goal  Goal: *Verbalizes anxiety and depression are reduced or absent  Outcome: Progressing Towards Goal  Goal: *Absence of Signs of Aspiration on Current Diet  Outcome: Progressing Towards Goal  Goal: *Absence of deep venous thrombosis signs and symptoms(Stroke Metric)  Outcome: Progressing Towards Goal  Goal: *Ability to perform ADLs and demonstrates progressive mobility and function  Outcome: Progressing Towards Goal  Goal: *Stroke education started(Stroke Metric)  Outcome: Progressing Towards Goal  Goal: *Dysphagia screen performed(Stroke Metric)  Outcome: Progressing Towards Goal  Goal: *Rehab consulted(Stroke Metric)  Outcome: Progressing Towards Goal     Problem: TIA/CVA Stroke: Day 2 Until Discharge  Goal: Off Pathway (Use only if patient is Off Pathway)  Outcome: Progressing Towards Goal  Goal: Activity/Safety  Outcome: Progressing Towards Goal  Goal: Diagnostic Test/Procedures  Outcome: Progressing Towards Goal  Goal: Nutrition/Diet  Outcome: Progressing Towards Goal  Goal: Discharge Planning  Outcome: Progressing Towards Goal  Goal: Medications  Outcome: Progressing Towards Goal  Goal: Respiratory  Outcome: Progressing Towards Goal  Goal: Treatments/Interventions/Procedures  Outcome: Progressing Towards Goal  Goal: Psychosocial  Outcome: Progressing Towards Goal  Goal: *Verbalizes anxiety and depression are reduced or absent  Outcome: Progressing Towards Goal  Goal: *Absence of aspiration  Outcome: Progressing Towards Goal  Goal: *Absence of deep venous thrombosis signs and symptoms(Stroke Metric)  Outcome: Progressing Towards Goal  Goal: *Optimal pain control at patient's stated goal  Outcome: Progressing Towards Goal  Goal: *Tolerating diet  Outcome: Progressing Towards Goal  Goal: *Ability to perform ADLs and demonstrates progressive mobility and function  Outcome: Progressing Towards Goal  Goal: *Stroke education continued(Stroke Metric)  Outcome: Progressing Towards Goal     Problem: Ischemic Stroke: Discharge Outcomes  Goal: *Verbalizes anxiety and depression are reduced or absent  Outcome: Progressing Towards Goal  Goal: *Verbalize understanding of risk factor modification(Stroke Metric)  Outcome: Progressing Towards Goal  Goal: *Hemodynamically stable  Outcome: Progressing Towards Goal  Goal: *Absence of aspiration pneumonia  Outcome: Progressing Towards Goal  Goal: *Aware of needed dietary changes  Outcome: Progressing Towards Goal  Goal: *Verbalize understanding of prescribed medications including anti-coagulants, anti-lipid, and/or anti-platelets(Stroke Metric)  Outcome: Progressing Towards Goal  Goal: *Tolerating diet  Outcome: Progressing Towards Goal  Goal: *Aware of follow-up diagnostics related to anticoagulants  Outcome: Progressing Towards Goal  Goal: *Ability to perform ADLs and demonstrates progressive mobility and function  Outcome: Progressing Towards Goal  Goal: *Absence of DVT(Stroke Metric)  Outcome: Progressing Towards Goal  Goal: *Absence of aspiration  Outcome: Progressing Towards Goal  Goal: *Optimal pain control at patient's stated goal  Outcome: Progressing Towards Goal  Goal: *Home safety concerns addressed  Outcome: Progressing Towards Goal  Goal: *Describes available resources and support systems  Outcome: Progressing Towards Goal  Goal: *Verbalizes understanding of activation of EMS(911) for stroke symptoms(Stroke Metric)  Outcome: Progressing Towards Goal  Goal: *Understands and describes signs and symptoms to report to providers(Stroke Metric)  Outcome: Progressing Towards Goal  Goal: *Neurolgocially stable (absence of additional neurological deficits)  Outcome: Progressing Towards Goal  Goal: *Verbalizes importance of follow-up with primary care physician(Stroke Metric)  Outcome: Progressing Towards Goal  Goal: *Smoking cessation discussed,if applicable(Stroke Metric)  Outcome: Progressing Towards Goal  Goal: *Depression screening completed(Stroke Metric)  Outcome: Progressing Towards Goal     Problem: Pain  Goal: *Control of Pain  Outcome: Progressing Towards Goal  Goal: *PALLIATIVE CARE:  Alleviation of Pain  Outcome: Progressing Towards Goal     Problem: Patient Education: Go to Patient Education Activity  Goal: Patient/Family Education  Outcome: Progressing Towards Goal     Problem: Falls - Risk of  Goal: *Absence of Falls  Description: Document Aylin Fall Risk and appropriate interventions in the flowsheet.   Outcome: Progressing Towards Goal  Note: Fall Risk Interventions:                                Problem: Patient Education: Go to Patient Education Activity  Goal: Patient/Family Education  Outcome: Progressing Towards Goal     Problem: Risk for Spread of Infection  Goal: Prevent transmission of infectious organism to others  Description: Prevent the transmission of infectious organisms to other patients, staff members, and visitors.   Outcome: Progressing Towards Goal     Problem: Patient Education:  Go to Education Activity  Goal: Patient/Family Education  Outcome: Progressing Towards Goal

## 2023-02-05 NOTE — PROGRESS NOTES
Neurology Progress Note    Patient ID:  Sandhya Castellanos  [id number]  80 y.o.  6/14/1932      CHIEF COMPLAINT: Increased left-sided weakness    Subjective:      Patient has complaints of increased left-sided weakness that occurred after he was just discharged from [facility] less than a week ago with acute stroke in the right basal ganglia area that now on MRI scan shows hemorrhagic transformation which is causing her increased weakness on the left side. She was on Xarelto and has been discontinued, and she is now on just a baby aspirin. She has a history of GI bleed in the past, we have reassured her that she could take a coated baby aspirin safely since has been 1 year since her stomach problem. She has a fairly flaccid or dense left hemiplegia now. She does not complain of any headache and we discussed her condition with the patient and her son in detail. We will repeat a CT tomorrow in the morning and she might be able to go back to her rehab center to repeat another CT scan in another week or 2 if tomorrow's are stable. Patient awaiting repeat CT scan, and the weakness on the left side may be just a little bit worse, as she is almost flaccid on the left, but not complaining of any headache or other new neurological symptoms.     Current Facility-Administered Medications   Medication Dose Route Frequency    lactulose (CHRONULAC) 10 gram/15 mL solution 15 mL  10 g Oral TID    melatonin tablet 3 mg  3 mg Oral QHS PRN    sodium chloride (NS) flush 5-10 mL  5-10 mL IntraVENous PRN    [Held by provider] amiodarone (CORDARONE) tablet 200 mg  200 mg Oral BID    aspirin chewable tablet 81 mg  81 mg Oral DAILY    latanoprost (XALATAN) 0.005 % ophthalmic solution 1 Drop  1 Drop Both Eyes QHS    levothyroxine (SYNTHROID) tablet 75 mcg  75 mcg Oral ACB    [Held by provider] losartan (COZAAR) tablet 100 mg  100 mg Oral DAILY    [Held by provider] metoprolol tartrate (LOPRESSOR) tablet 25 mg  25 mg Oral BID [Held by provider] amLODIPine (NORVASC) tablet 5 mg  5 mg Oral DAILY    timolol (TIMOPTIC) 0.5 % ophthalmic solution 1 Drop  1 Drop Both Eyes BID    pantoprazole (PROTONIX) tablet 40 mg  40 mg Oral ACB    [Held by provider] rivaroxaban (XARELTO) tablet 15 mg  15 mg Oral DAILY    therapeutic multivitamin (THERAGRAN) tablet 1 Tablet  1 Tablet Oral DAILY    acetaminophen (TYLENOL) tablet 650 mg  650 mg Oral Q4H PRN    Or    acetaminophen (TYLENOL) solution 650 mg  650 mg Per NG tube Q4H PRN    Or    acetaminophen (TYLENOL) suppository 650 mg  650 mg Rectal Q4H PRN    magnesium hydroxide (MILK OF MAGNESIA) 400 mg/5 mL oral suspension 30 mL  30 mL Oral DAILY PRN    guaiFENesin ER (MUCINEX) tablet 600 mg  600 mg Oral Q12H    benzonatate (TESSALON) capsule 100 mg  100 mg Oral TID PRN    acetaminophen (TYLENOL) tablet 500 mg  500 mg Oral Q6H PRN    insulin lispro (HUMALOG) injection   SubCUTAneous AC&HS    glucose chewable tablet 16 g  4 Tablet Oral PRN    glucagon (GLUCAGEN) injection 1 mg  1 mg IntraMUSCular PRN    dextrose 10% infusion 0-250 mL  0-250 mL IntraVENous PRN        Past Medical History:   Diagnosis Date    Atrial fibrillation (HCC)     CVA (cerebral vascular accident) ([facility] [address]     GERD (gastroesophageal reflux disease)     Glaucoma     HTN (hypertension)     Hyperlipidemia        No past surgical history on file.     [unfilled]    Social History     Tobacco Use    Smoking status: Never    Smokeless tobacco: Never   Substance Use Topics    Alcohol use: Not Currently       Current Facility-Administered Medications   Medication Dose Route Frequency Provider Last Rate Last Admin    lactulose (CHRONULAC) 10 gram/15 mL solution 15 mL  10 g Oral TID Haris Johns MD   15 mL at 02/05/23 1606    melatonin tablet 3 mg  3 mg Oral QHS PRN Yaa Mcallister PA-C        sodium chloride (NS) flush 5-10 mL  5-10 mL IntraVENous PRN Levy Galdamez MD        [facility] [place] by provider] amiodarone (CORDARONE) tablet 200 mg  200 mg Oral BID Martha Dawn MD        aspirin chewable tablet 81 mg  81 mg Oral DAILY Martha Dawn MD   81 mg at 02/05/23 0904    latanoprost (XALATAN) 0.005 % ophthalmic solution 1 Drop  1 Drop Both Eyes QHS Martha Dawn MD   1 Drop at 02/04/23 2241    levothyroxine (SYNTHROID) tablet 75 mcg  75 mcg Oral ACB Martha Dawn MD   75 mcg at 02/05/23 2736    [Held by provider] losartan (COZAAR) tablet 100 mg  100 mg Oral DAILY Martha Dawn MD        [facility] [place] by provider] metoprolol tartrate (LOPRESSOR) tablet 25 mg  25 mg Oral BID Martha Dawn MD        [facility] [place] by provider] amLODIPine (NORVASC) tablet 5 mg  5 mg Oral DAILY Martha Dawn MD        timolol (TIMOPTIC) 0.5 % ophthalmic solution 1 Drop  1 Drop Both Eyes BID Martha Dawn MD   1 Drop at 02/05/23 0906    pantoprazole (PROTONIX) tablet 40 mg  40 mg Oral ACB Martha Dawn MD   40 mg at 02/05/23 8303    [Held by provider] rivaroxaban Ranjan Lopez) tablet 15 mg  15 mg Oral DAILY Martha Dawn MD        therapeutic multivitamin [facility]) tablet 1 Tablet  1 Tablet Oral DAILY Martha Dawn MD   1 Tablet at 02/05/23 8975    acetaminophen (TYLENOL) tablet 650 mg  650 mg Oral Q4H PRN Martha Dawn MD   650 mg at 02/05/23 1220    Or    acetaminophen (TYLENOL) solution 650 mg  650 mg Per NG tube Q4H PRN Martha Dawn MD        Or    acetaminophen (TYLENOL) suppository 650 mg  650 mg Rectal Q4H PRN Martha Dawn MD        magnesium hydroxide (MILK OF MAGNESIA) 400 mg/5 mL oral suspension 30 mL  30 mL Oral DAILY PRN Martha Dawn MD        guaiFENesin ER [facility]) tablet 600 mg  600 mg Oral Q12H Martha Dawn MD   600 mg at 02/05/23 2892    benzonatate (TESSALON) capsule 100 mg  100 mg Oral TID PRN Martha Dawn MD        acetaminophen (TYLENOL) tablet 500 mg  500 mg Oral Q6H PRN Mratha Dawn MD        insulin lispro (HUMALOG) injection   SubCUTAneous AC&HS Martha Dawn MD   2 Units at 02/04/23 1147    glucose chewable tablet 16 g  4 Tablet Oral PRN Lino Wiggins MD        glucagon [facility]) injection 1 mg  1 mg IntraMUSCular PRN Lino Wiggins MD        dextrose 10% infusion 0-250 mL  0-250 mL IntraVENous PRN Lino Wiggins MD           No Known Allergies    Review of Systems:    A comprehensive review of systems was negative except for: Neurological: positive for paresthesia and weakness    Objective:    Objective:     Patient Vitals for the past 24 hrs:   BP Temp Pulse Resp SpO2   02/05/23 1527 (!) 153/62 97.7 °F (36.5 °C) -- 18 --   02/05/23 1522 (!) 150/76 97.4 °F (36.3 °C) 70 18 100 %   02/05/23 1049 136/76 97.3 °F (36.3 °C) 62 16 100 %   02/05/23 0800 (!) 148/66 97.9 °F (36.6 °C) (!) 54 16 100 %   02/05/23 0320 (!) 148/77 97.3 °F (36.3 °C) (!) 56 15 98 %   02/04/23 2322 (!) 120/54 97.7 °F (36.5 °C) (!) 53 15 100 %   02/04/23 1928 (!) 173/74 97.7 °F (36.5 °C) (!) 55 20 100 %   02/04/23 1626 (!) 159/66 97.5 °F (36.4 °C) (!) 57 18 100 %         Lab Review   Recent Results (from the past 24 hour(s))   GLUCOSE, POC    Collection Time: 02/04/23  4:33 PM   Result Value Ref Range    Glucose (POC) 62 (L) 65 - 117 mg/dL    Performed by Mychal Floyd, POC    Collection Time: 02/04/23  5:00 PM   Result Value Ref Range    Glucose (POC) 92 65 - 117 mg/dL    Performed by Mychal Floyd, POC    Collection Time: 02/04/23  5:26 PM   Result Value Ref Range    Glucose (POC) 97 65 - 117 mg/dL    Performed by Mychal Floyd, POC    Collection Time: 02/04/23  5:56 PM   Result Value Ref Range    Glucose (POC) 83 65 - 117 mg/dL    Performed by Mychal Floyd, POC    Collection Time: 02/04/23  6:00 PM   Result Value Ref Range    Glucose (POC) 123 (H) 65 - 117 mg/dL    Performed by Anna Key    GLUCOSE, POC    Collection Time: 02/04/23  9:17 PM   Result Value Ref Range    Glucose (POC) 104 65 - 117 mg/dL    Performed by Dahiana Lockwood PCT    CBC WITH AUTOMATED DIFF    Collection Time: 02/05/23  3:33 AM   Result Value Ref Range    WBC 4.2 3.6 - 11.0 K/uL    RBC 3.63 (L) 3.80 - 5.20 M/uL    HGB 12.2 11.5 - 16.0 g/dL    HCT 35.1 35.0 - 47.0 %    MCV 96.7 80.0 - 99.0 FL    MCH 33.6 26.0 - 34.0 PG    MCHC 34.8 30.0 - 36.5 g/dL    RDW 11.5 11.5 - 14.5 %    PLATELET 047 433 - 886 K/uL    MPV 9.4 8.9 - 12.9 FL    NRBC 0.0 0  WBC    ABSOLUTE NRBC 0.00 0.00 - 0.01 K/uL    NEUTROPHILS 66 32 - 75 %    LYMPHOCYTES 20 12 - 49 %    MONOCYTES 9 5 - 13 %    EOSINOPHILS 3 0 - 7 %    BASOPHILS 1 0 - 1 %    IMMATURE GRANULOCYTES 1 (H) 0.0 - 0.5 %    ABS. NEUTROPHILS 2.8 1.8 - 8.0 K/UL    ABS. LYMPHOCYTES 0.8 0.8 - 3.5 K/UL    ABS. MONOCYTES 0.4 0.0 - 1.0 K/UL    ABS. EOSINOPHILS 0.1 0.0 - 0.4 K/UL    ABS. BASOPHILS 0.0 0.0 - 0.1 K/UL    ABS. IMM.  GRANS. 0.0 0.00 - 0.04 K/UL    DF AUTOMATED     METABOLIC PANEL, BASIC    Collection Time: 02/05/23  3:33 AM   Result Value Ref Range    Sodium 142 136 - 145 mmol/L    Potassium 3.9 3.5 - 5.1 mmol/L    Chloride 112 (H) 97 - 108 mmol/L    CO2 25 21 - 32 mmol/L    Anion gap 5 5 - 15 mmol/L    Glucose 124 (H) 65 - 100 mg/dL    BUN 25 (H) 6 - 20 MG/DL    Creatinine 0.76 0.55 - 1.02 MG/DL    BUN/Creatinine ratio 33 (H) 12 - 20      eGFR >60 >60 ml/min/1.73m2    Calcium 9.1 8.5 - 10.1 MG/DL   GLUCOSE, POC    Collection Time: 02/05/23  6:26 AM   Result Value Ref Range    Glucose (POC) 107 65 - 117 mg/dL    Performed by Joel Gilliam PCT    GLUCOSE, POC    Collection Time: 02/05/23 10:50 AM   Result Value Ref Range    Glucose (POC) 116 65 - 117 mg/dL    Performed by Kirill Wood PCT    GLUCOSE, POC    Collection Time: 02/05/23  3:51 PM   Result Value Ref Range    Glucose (POC) 90 65 - 117 mg/dL    Performed by Kirill Wood PCT            Additional comments:I personally viewed and interpreted the patient's MRI scan and CT scans  MRI Results (most recent):  Results from Hospital Encounter encounter on 02/03/23    MRI BRAIN W WO CONT    Narrative  PRELIMINARY REPORT  No emergent process. Restricted diffusion surrounding the area of enhancement  previously identified    Preliminary report was provided by Dr. Sanchez Better  Final report to follow. END PRELIMINARY REPORT      EXAM:  MRI BRAIN W WO CONT    INDICATION:    Abnormal ring-enhancing appearance of the right caudate and  putamen, recent CVA 3wks ago    COMPARISON:  CTA head neck 2/3/2023. CONTRAST: 12 ml ProHance. TECHNIQUE:  Multiplanar multisequence acquisition without and with contrast of the brain. FINDINGS:  Subacute infarct in the right corona radiata/basal ganglia with associated  subacute hematomas within the right caudate, putamen and globus pallidus with  associated peripheral enhancement within the right caudate and putamen. Generalized parenchymal volume loss with commensurate dilation of the sulci and  ventricular system. Incidental cavum vergae. Confluent areas of periventricular  and deep white matter T2/FLAIR hyperintensity, consistent with moderate to  severe chronic microvascular ischemic disease. Tiny chronic infarct in the right  cerebellum. There is no cerebellar tonsillar herniation. Expected arterial  flow-voids are present. Incidental small right cerebellar developmental venous  anomaly. The paranasal sinuses are clear. Trace left mastoid effusion. The orbital  contents are within normal limits with bilateral lens implants. No significant  osseous or scalp lesions are identified. Grade 1 anterolisthesis of C3 on C4  secondary to severe facet arthropathy in the cervical spine, with multilevel  degenerative disc disease also noted. Impression  1. Subacute hemorrhage with associated peripheral enhancement throughout the  right basal ganglia, associated with a subacute infarct in the right corona  radiata/basal ganglia.  Follow-up brain MRI in 6-8 weeks is recommended to  establish expected evolution of the suspected subacute infarct with hemorrhagic  conversion. 2. Generalized parenchymal volume loss and moderate to severe chronic  microvascular ischemic disease. Tiny chronic infarct in the right cerebellum. Results from [facility] encounter on 02/03/23    MRI BRAIN W WO CONT    Narrative  PRELIMINARY REPORT  No emergent process. Restricted diffusion surrounding the area of enhancement  previously identified    Preliminary report was provided by Dr. Kathy Myers  Final report to follow. END PRELIMINARY REPORT      EXAM:  MRI BRAIN W WO CONT    INDICATION:    Abnormal ring-enhancing appearance of the right caudate and  putamen, recent CVA 3wks ago    COMPARISON:  CTA head neck 2/3/2023. CONTRAST: 12 ml ProHance. TECHNIQUE:  Multiplanar multisequence acquisition without and with contrast of the brain. FINDINGS:  Subacute infarct in the right corona radiata/basal ganglia with associated  subacute hematomas within the right caudate, putamen and globus pallidus with  associated peripheral enhancement within the right caudate and putamen. Generalized parenchymal volume loss with commensurate dilation of the sulci and  ventricular system. Incidental cavum vergae. Confluent areas of periventricular  and deep white matter T2/FLAIR hyperintensity, consistent with moderate to  severe chronic microvascular ischemic disease. Tiny chronic infarct in the right  cerebellum. There is no cerebellar tonsillar herniation. Expected arterial  flow-voids are present. Incidental small right cerebellar developmental venous  anomaly. The paranasal sinuses are clear. Trace left mastoid effusion. The orbital  contents are within normal limits with bilateral lens implants. No significant  osseous or scalp lesions are identified. Grade 1 anterolisthesis of C3 on C4  secondary to severe facet arthropathy in the cervical spine, with multilevel  degenerative disc disease also noted. Impression  1.  Subacute hemorrhage with associated peripheral enhancement throughout the  right basal ganglia, associated with a subacute infarct in the right corona  radiata/basal ganglia. Follow-up brain MRI in 6-8 weeks is recommended to  establish expected evolution of the suspected subacute infarct with hemorrhagic  conversion. 2. Generalized parenchymal volume loss and moderate to severe chronic  microvascular ischemic disease. Tiny chronic infarct in the right cerebellum. NEUROLOGICAL EXAM:    Appearance: The patient is well developed, well nourished, provides a coherent history and is in no acute distress. Mental Status: Oriented to time, place and person and the president, cognitive function and fund of knowledge is probably normal for her age. Speech is fluent without aphasia or dysarthria. Mood and affect appropriate. Cranial Nerves:   Intact visual fields. Fundi are benign, discs are flat, no lesions seen on funduscopy. ROVERTO, EOM's full, no nystagmus, no ptosis. Facial sensation is normal. Corneal reflexes are not tested. Facial movement is weak on the left. Hearing is normal bilaterally. Palate is midline with normal sternocleidomastoid and trapezius muscles are normal. Tongue is midline. Neck without meningismus or bruits   Motor:  5/5 strength in upper and lower proximal and distal muscles on the right, but dense almost flaccid left hemiplegia persists. Normal bulk and tone. No fasciculations. Rapid alternating movement is symmetric and intact in all extremities   Reflexes:   Deep tendon reflexes 1+/4 and symmetrical.  No babinski or clonus present   Sensory:   Normal to touch, pinprick and temperature and vibration. DSS is intact   Gait:  Not testable    Tremor:   No tremor noted. Cerebellar:  Not tested abnormal cerebellar signs present on Romberg, tandem, and finger-nose-finger exam.   Neurovascular:  Abnormal heart sounds and irregular rhythm, peripheral pulses decreased and no carotid bruits. Assessment:         ICD-10-CM ICD-9-CM    1. Encephalopathy [G93.40 (ICD-10-CM)]  G93.40 348.30       2. Stroke-like episode [R29.90 (ICD-10-CM)]  R29.90 781.99       3. 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  We will repeat her CT scan tomorrow to make sure there is no enlarging hematoma, and if that is stable we can probably let her go back to rehab and repeat her scan in a week to 2 as an outpatient. Discussed with the patient and her son in detail and they agree with plans and therapy. Discussed with nursing staff also. Continue excellent medical care, 45 minutes spent with the patient going over her chest, reviewing her scans personally, and discussing her condition with the patient and wife and the staff all in detail. Patient awaiting repeat CT scan, and the weakness on the left side may be just a little bit worse, as she is almost flaccid on the left, but not complaining of any headache or other new neurological symptoms.   35 minutes met with the patient reviewing her records, and discussing her case with the nursing staff, very important we get the repeat CT scan to make sure there is no progression of her intracranial hemorrhage      Signed:  Sixto Novoa MD  2/5/2023  4:19 PM    None  None

## 2023-02-05 NOTE — PROGRESS NOTES
Bedside shift change report given to GEORGIA Wren (oncoming nurse) by Nadege Osei RN (offgoing nurse). Report included the following information SBAR, Kardex, ED Summary, Intake/Output, MAR, Recent Results, and Cardiac Rhythm NSB to NSR . Patient is high fall risk. Necessary fall safety interventions in use and fall prevention education provided to patient.

## 2023-02-05 NOTE — PROGRESS NOTES
Progress Note      2/5/2023 11:26 AM  NAME: Bria Martínez   MRN:  [id number]   Admit Diagnosis: Stroke-like episode [R29.90]                Assessment:       - Recent CVA with hemorragic conversion  - AMS     - CAD with 1V CABG, stress in 2016 no ischemia  - Echo 1/2023 EF 55%, mild AI, mild MR  - PAF with hx of bradycardia  - HTN  - Dyslipidemia  - Carotid 1/2023 mild  - Hypothyroid  - GERD     Cardiologist: Dora Banks                     Plan:        - Negative enyzmes  - Euvolemic  - Paroxysmal afib, with maggie when in sinus     - Start xarelto when ok with neurology  - holding amiodarone today, resume at 200mg daily soon  - Stop metoprolol  - Hold amlodipine, resume as able  - Hold losartan, resume as able  - Stop fluids as tolerates po     Follow up Dora Banks to see if watchman candidate          [x]       High complexity decision making was performed in this patient at high risk for decompensation with multiple organ involvement. We discussed the expected course, resolution and complications of the diagnoses in detail. Medication risk, benefits, costs, interactions, and alternatives were discussed as indicated. I advised him to contact the office if his condition worsens, changes or fails to improve as anticipated. Patient expressed understanding with the diagnoses  and plan. Subjective:     Gisell S Jason denies chest pain, dyspnea. Discussed with RN events overnight. Review of Systems:    Symptom Y/N Comments  Symptom Y/N Comments   Fever/Chills N   Chest Pain N    Poor Appetite N   Edema N    Cough N   Abdominal Pain N    Sputum N   Joint Pain N    SOB/MARTIN N   Pruritis/Rash N    Nausea/vomit N   Tolerating PT/OT Y    Diarrhea N   Tolerating Diet Y    Constipation N   Other       Could NOT obtain due to:      Objective:      Physical Exam:    Last 24hrs VS reviewed since prior progress note.  Most recent are:    Visit Vitals  /76   Pulse 62   Temp 97.3 °F (36.3 °C)   Resp 16 Ht 5' 8\" (1.727 m)   Wt 60.7 kg (133 lb 12.8 oz)   SpO2 100%   BMI 20.34 kg/m²       Intake/Output Summary (Last 24 hours) at 2/5/2023 1126  Last data filed at 2/5/2023 0603  Gross per 24 hour   Intake 440 ml   Output 300 ml   Net 140 ml        General Appearance: Well developed, well nourished, alert & oriented x 3,    no acute distress. Ears/Nose/Mouth/Throat: Hearing grossly normal.  Neck: Supple. Chest: Lungs clear to auscultation bilaterally. Cardiovascular: Regular rate and rhythm, S1S2 normal, no murmur. Abdomen: Soft, non-tender, bowel sounds are active. Extremities: No edema bilaterally. Skin: Warm and dry. PMH/SH reviewed - no change compared to H&P    Data Review    Telemetry: normal sinus rhythm     Lab Data Personally Reviewed:    Recent Labs     02/05/23  0333 02/03/23  0925   WBC 4.2 5.2   HGB 12.2 12.8   HCT 35.1 37.5    449*     Recent Labs     02/03/23  1001   INR 1.5*   PTP 15.2*      Recent Labs     02/05/23  0333 02/03/23  0925    141   K 3.9 3.5   * 109*   CO2 25 28   BUN 25* 26*   CREA 0.76 0.91   * 100   CA 9.1 9.3     No results for input(s): CPK, CKNDX, TROIQ in the last 72 hours. No lab exists for component: CPKMB  Lab Results   Component Value Date/Time    Cholesterol, total 164 02/04/2023 02:47 AM    HDL Cholesterol 43 02/04/2023 02:47 AM    LDL, calculated 96.4 02/04/2023 02:47 AM    Triglyceride 123 02/04/2023 02:47 AM    CHOL/HDL Ratio 3.8 02/04/2023 02:47 AM       Recent Labs     02/03/23  0925   AP 90   TP 7.1   ALB 2.7*   GLOB 4.4*     No results for input(s): PH, PCO2, PO2 in the last 72 hours.     Medications Personally Reviewed:    Current Facility-Administered Medications   Medication Dose Route Frequency    lactulose (CHRONULAC) 10 gram/15 mL solution 15 mL  10 g Oral TID    0.9% sodium chloride infusion  50 mL/hr IntraVENous CONTINUOUS    melatonin tablet 3 mg  3 mg Oral QHS PRN    sodium chloride (NS) flush 5-10 mL  5-10 mL IntraVENous PRN    [Held by provider] amiodarone (CORDARONE) tablet 200 mg  200 mg Oral BID    aspirin chewable tablet 81 mg  81 mg Oral DAILY    latanoprost (XALATAN) 0.005 % ophthalmic solution 1 Drop  1 Drop Both Eyes QHS    levothyroxine (SYNTHROID) tablet 75 mcg  75 mcg Oral ACB    [Held by provider] losartan (COZAAR) tablet 100 mg  100 mg Oral DAILY    [Held by provider] metoprolol tartrate (LOPRESSOR) tablet 25 mg  25 mg Oral BID    [Held by provider] amLODIPine (NORVASC) tablet 5 mg  5 mg Oral DAILY    timolol (TIMOPTIC) 0.5 % ophthalmic solution 1 Drop  1 Drop Both Eyes BID    pantoprazole (PROTONIX) tablet 40 mg  40 mg Oral ACB    [Held by provider] rivaroxaban (XARELTO) tablet 15 mg  15 mg Oral DAILY    therapeutic multivitamin (THERAGRAN) tablet 1 Tablet  1 Tablet Oral DAILY    acetaminophen (TYLENOL) tablet 650 mg  650 mg Oral Q4H PRN    Or    acetaminophen (TYLENOL) solution 650 mg  650 mg Per NG tube Q4H PRN    Or    acetaminophen (TYLENOL) suppository 650 mg  650 mg Rectal Q4H PRN    magnesium hydroxide (MILK OF MAGNESIA) 400 mg/5 mL oral suspension 30 mL  30 mL Oral DAILY PRN    guaiFENesin ER (MUCINEX) tablet 600 mg  600 mg Oral Q12H    benzonatate (TESSALON) capsule 100 mg  100 mg Oral TID PRN    acetaminophen (TYLENOL) tablet 500 mg  500 mg Oral Q6H PRN    insulin lispro (HUMALOG) injection   SubCUTAneous AC&HS    glucose chewable tablet 16 g  4 Tablet Oral PRN    glucagon (GLUCAGEN) injection 1 mg  1 mg IntraMUSCular PRN    dextrose 10% infusion 0-250 mL  0-250 mL IntraVENous PRN         Elli Cottrell MD

## 2023-02-05 NOTE — PROGRESS NOTES
End of Shift Note    Bedside shift change report given to Mike Min RN (oncoming nurse) by Loree Hill LPN (offgoing nurse). Report included the following information SBAR, Kardex, ED Summary, MAR, and Recent Results    Shift worked:  Nights   Shift summary and any significant changes:    Turned and repositioned every 2 hours,        Concerns for physician to address:  none   Zone phone for oncoming shift:   [phone]     Patient Information  Rabia Calles  80 y.o.  2/3/2023  9:02 AM by Abel El MD. Rabia Calles was [facility] from [facility]    Problem List  Patient Active Problem List    Diagnosis Date Noted    History of hemorrhagic stroke with residual hemiplegia ([facility] [address] 02/04/2023    Hemiplegia of left nondominant side as late effect of cerebrovascular disease ([facility] [address] 02/04/2023    Stroke-like episode 02/03/2023     Past Medical History:   Diagnosis Date    Atrial fibrillation ([facility] [address]     CVA (cerebral vascular accident) ([facility] [address]     GERD (gastroesophageal reflux disease)     Glaucoma     HTN (hypertension)     Hyperlipidemia        Core Measures:  CVA: Yes Yes  CHF:No Not applicable  PNA:No Not applicable    Activity:  Activity Level: Bed Rest  Number times ambulated in hallways past shift: 0  Number of times OOB to chair past shift: 0    Cardiac:   Cardiac Monitoring: Yes      Cardiac Rhythm: Sinus Courtenay Levels    Access:   Current line(s): PIV   Central Line? No Placement date  Reason Medically Necessary   PICC LINE? No Placement date Reason Medically Necessary     Genitourinary:   Urinary status: has not voided this shift. Urinary Catheter?  No Placement Date  Reason Medically Necessary     Respiratory:   O2 Device: None (Room air)  Chronic home O2 use?: NO  Incentive spirometer at bedside: N/A       GI:  Last Bowel Movement Date:  (PTA)  Current diet:  DIET ONE TIME MESSAGE  ADULT DIET Dysphagia - Pureed  Passing flatus: YES  Tolerating current diet: TBD       Pain Management:   Patient states pain is manageable on current regimen: YES    Skin:  Mikey Score: 16  Interventions: float heels, PT/OT consult, limit briefs, and internal/external urinary devices    Patient Safety:  Fall Score:    Interventions: bed/chair alarm, gripper socks, and pt to call before getting OOB     @Rollbelt  @dexterity to release roll belt  Yes/No ( must document dexterity  here by stating Yes or No here, otherwise this is a restraint and must follow restraint documentation policy.)    DVT prophylaxis:  DVT prophylaxis Med- no  DVT prophylaxis SCD or JOSELO- No     Wounds: (If Applicable)  Wounds- No  Location     Active Consults:  IP CONSULT TO NEUROLOGY  IP CONSULT TO CARDIOLOGY    Length of Stay:  Expected LOS: - - -  Actual LOS: 2  Discharge Plan: No MARIA DEL ROSARIO Davalos LPN

## 2023-02-05 NOTE — PROGRESS NOTES
Message sent via [facility] to Dr. Jarvis Joya    [phone] Hospital or Facility: [facility] From: Joel Beckford RE: Shazia Kendall 06/14/1932 RM: 131 Patient has not urinated today. Bladder scan done and the client is retaining 246 cc. Her bladder is hard and she has the urge to urinate when palpating. Night shift LPN stated they had this issue yesterday. Fluids were started and she voiding over 300cc last night. Need Callback: NEEDS CALLBACK    STRAIGHT CATH ORDERED  CC OF URINE OBTAINED.

## 2023-02-06 LAB
GLUCOSE BLD STRIP.AUTO-MCNC: 108 MG/DL (ref 65–117)
GLUCOSE BLD STRIP.AUTO-MCNC: 74 MG/DL (ref 65–117)
GLUCOSE BLD STRIP.AUTO-MCNC: 77 MG/DL (ref 65–117)
GLUCOSE BLD STRIP.AUTO-MCNC: 98 MG/DL (ref 65–117)
SERVICE CMNT-IMP: NORMAL

## 2023-02-06 PROCEDURE — 92507 TX SP LANG VOICE COMM INDIV: CPT

## 2023-02-06 PROCEDURE — 51798 US URINE CAPACITY MEASURE: CPT

## 2023-02-06 PROCEDURE — 65270000046 HC RM TELEMETRY

## 2023-02-06 PROCEDURE — 74011250637 HC RX REV CODE- 250/637: Performed by: INTERNAL MEDICINE

## 2023-02-06 PROCEDURE — 82962 GLUCOSE BLOOD TEST: CPT

## 2023-02-06 PROCEDURE — 99232 SBSQ HOSP IP/OBS MODERATE 35: CPT | Performed by: PSYCHIATRY & NEUROLOGY

## 2023-02-06 PROCEDURE — 74011000250 HC RX REV CODE- 250: Performed by: STUDENT IN AN ORGANIZED HEALTH CARE EDUCATION/TRAINING PROGRAM

## 2023-02-06 PROCEDURE — 74011250637 HC RX REV CODE- 250/637: Performed by: STUDENT IN AN ORGANIZED HEALTH CARE EDUCATION/TRAINING PROGRAM

## 2023-02-06 RX ORDER — AMIODARONE HYDROCHLORIDE 200 MG/1
100 TABLET ORAL DAILY
Status: DISCONTINUED | OUTPATIENT
Start: 2023-02-06 | End: 2023-02-13 | Stop reason: HOSPADM

## 2023-02-06 RX ADMIN — LACTULOSE 15 ML: 20 SOLUTION ORAL at 18:26

## 2023-02-06 RX ADMIN — TIMOLOL MALEATE 1 DROP: 5 SOLUTION OPHTHALMIC at 10:23

## 2023-02-06 RX ADMIN — LACTULOSE 15 ML: 20 SOLUTION ORAL at 09:46

## 2023-02-06 RX ADMIN — LATANOPROST 1 DROP: 50 SOLUTION OPHTHALMIC at 21:56

## 2023-02-06 RX ADMIN — PANTOPRAZOLE SODIUM 40 MG: 40 TABLET, DELAYED RELEASE ORAL at 09:47

## 2023-02-06 RX ADMIN — AMIODARONE HYDROCHLORIDE 100 MG: 200 TABLET ORAL at 09:47

## 2023-02-06 RX ADMIN — THERA TABS 1 TABLET: TAB at 09:47

## 2023-02-06 RX ADMIN — ASPIRIN 81 MG: 81 TABLET, CHEWABLE ORAL at 09:47

## 2023-02-06 RX ADMIN — LEVOTHYROXINE SODIUM 75 MCG: 0.07 TABLET ORAL at 09:47

## 2023-02-06 RX ADMIN — TIMOLOL MALEATE 1 DROP: 5 SOLUTION OPHTHALMIC at 19:36

## 2023-02-06 RX ADMIN — SODIUM CHLORIDE, PRESERVATIVE FREE 10 ML: 5 INJECTION INTRAVENOUS at 21:55

## 2023-02-06 RX ADMIN — GUAIFENESIN 600 MG: 600 TABLET, EXTENDED RELEASE ORAL at 21:55

## 2023-02-06 RX ADMIN — SODIUM CHLORIDE, PRESERVATIVE FREE 10 ML: 5 INJECTION INTRAVENOUS at 04:30

## 2023-02-06 RX ADMIN — AMLODIPINE BESYLATE 5 MG: 5 TABLET ORAL at 09:46

## 2023-02-06 NOTE — PROGRESS NOTES
Hospitalist Progress Note    Subjective:   Daily Progress Note: 2/6/2023 2:24 PM    Hospital Course:  Jhon Frye is a 80 y.o.  female who presents with PMH of HTN, afib, CVA, DM, glaucoma, hypothyroidism, and GERD. Patient presenting from [facility] after episode of AMS this morning. Reportedly she fell from bed level yesterday and did not hit her head. Yesterday post fall she was fine at new baseline of bed bound with some dysarthria, however able to talk. This morning staff noted that she was altered by finding that she only responded to pain. Per son when he presented to ED, patient was back to her new baseline post CVA. Prior to stroke 3 weeks ago, she was living independently without a assisted walking device. She was driving and managing her ADLs independently with some short term memory difficulties. Since 1/2023 CVA she has been in rehab with left sided hemiplegia and left sided facial droop. MRI brain shows subacute hemorrhage associated with subacute infarct in right corona radiata/basal ganglia. Subjective:  Patient had urinary retention, straight cath done. Patient denies any new complaints. Assessment / Plan:  Acute encephalopathy:  Hemorrhagic transformation of recent stroke  Transient  MRI brain shows subacute hemorrhage associated with subacute infarct in right corona radiata/basal ganglia. Repeat CT head today  Repeat CT head in 1-2 week as outpatient. Neurology recommends to be off for xarelto for 4-6 weeks. Has elevated ammonia level- started on lactulose  Repeat CT head on 2/5/23- stable. Cough:  Rhino and enterovirus +  Mucinex    Hypertension  Afib:  Hold xarelto in the setting of subacute hemorrhage  Hold  losartan, metoprolol  Resume amlodipine  Started on amiodarone at lower dose 100 mg po daily due to bradycardia  Appreciate cardiology consult  Outpatient cardiology follow up for watchman evaluation.      Diabetes mellitus:  Continue sliding scale    Glaucoma:  Continue eye drops        18.5 - 24.9 Normal weight / Body mass index is 20.34 kg/m². Code status: Full  Prophylaxis: SCD's  Recommended Disposition: SNF/LTC  JACKY: 2/723  Barrier: placement.        Current Facility-Administered Medications   Medication Dose Route Frequency    amiodarone (CORDARONE) tablet 100 mg  100 mg Oral DAILY    lactulose (CHRONULAC) 10 gram/15 mL solution 15 mL  10 g Oral TID    melatonin tablet 3 mg  3 mg Oral QHS PRN    sodium chloride (NS) flush 5-10 mL  5-10 mL IntraVENous PRN    aspirin chewable tablet 81 mg  81 mg Oral DAILY    latanoprost (XALATAN) 0.005 % ophthalmic solution 1 Drop  1 Drop Both Eyes QHS    levothyroxine (SYNTHROID) tablet 75 mcg  75 mcg Oral ACB    [Held by provider] losartan (COZAAR) tablet 100 mg  100 mg Oral DAILY    amLODIPine (NORVASC) tablet 5 mg  5 mg Oral DAILY    timolol (TIMOPTIC) 0.5 % ophthalmic solution 1 Drop  1 Drop Both Eyes BID    pantoprazole (PROTONIX) tablet 40 mg  40 mg Oral ACB    [Held by provider] rivaroxaban (XARELTO) tablet 15 mg  15 mg Oral DAILY    therapeutic multivitamin (THERAGRAN) tablet 1 Tablet  1 Tablet Oral DAILY    acetaminophen (TYLENOL) tablet 650 mg  650 mg Oral Q4H PRN    Or    acetaminophen (TYLENOL) solution 650 mg  650 mg Per NG tube Q4H PRN    Or    acetaminophen (TYLENOL) suppository 650 mg  650 mg Rectal Q4H PRN    magnesium hydroxide (MILK OF MAGNESIA) 400 mg/5 mL oral suspension 30 mL  30 mL Oral DAILY PRN    guaiFENesin ER (MUCINEX) tablet 600 mg  600 mg Oral Q12H    benzonatate (TESSALON) capsule 100 mg  100 mg Oral TID PRN    acetaminophen (TYLENOL) tablet 500 mg  500 mg Oral Q6H PRN    insulin lispro (HUMALOG) injection   SubCUTAneous AC&HS    glucose chewable tablet 16 g  4 Tablet Oral PRN    glucagon (GLUCAGEN) injection 1 mg  1 mg IntraMUSCular PRN    dextrose 10% infusion 0-250 mL  0-250 mL IntraVENous PRN        Review of Systems  Constitutional: No fevers, No chills, No sweats, No fatigue, No Weakness  Eyes: No redness  Ears, nose, mouth, throat, and face: No nasal congestion, No sore throat, No voice change  Respiratory: No Shortness of Breath, No cough, No wheezing  Cardiovascular: No chest pain, No palpitations, No extremity edema  Gastrointestinal: No nausea, No vomiting, No diarrhea, No abdominal pain  Genitourinary: No frequency, No dysuria, No hematuria  Integument/breast: No skin lesion(s)   Neurological: has mild Confusion, No headaches, No dizziness      Objective:     Visit Vitals  BP (!) 166/72   Pulse (!) 49   Temp 98.4 °F (36.9 °C)   Resp 17   Ht 5' 8\" (1.727 m)   Wt 60.7 kg (133 lb 12.8 oz)   SpO2 100%   BMI 20.34 kg/m²      O2 Device: None (Room air)    Temp (24hrs), Av.7 °F (36.5 °C), Min:97.4 °F (36.3 °C), Max:98.4 °F (36.9 °C)      No intake/output data recorded.  1901 -  0700  In: -   Out: 550 [Urine:550]    PHYSICAL EXAM:  Constitutional: No acute distress  Skin: Extremities and face reveal no rashes. HEENT: Sclerae anicteric. Extra-occular muscles are intact. No oral ulcers. The neck is supple and no masses. Cardiovascular: Regular rate and rhythm. Respiratory:  Clear breath sounds bilaterally with no wheezes, rales, or rhonchi. GI: Abdomen nondistended, soft, and nontender. Normal active bowel sounds. Musculoskeletal: No pitting edema of the lower legs. Able to move all ext  Neurological:  Patient was sleepy this morning.    Psychiatric: Mood appears appropriate       Data Review    Recent Results (from the past 24 hour(s))   GLUCOSE, POC    Collection Time: 23  3:51 PM   Result Value Ref Range    Glucose (POC) 90 65 - 117 mg/dL    Performed by Windy Stewart PCT    GLUCOSE, POC    Collection Time: 23 10:08 PM   Result Value Ref Range    Glucose (POC) 91 65 - 117 mg/dL    Performed by Lidia Bennett PCT    GLUCOSE, POC    Collection Time: 23 10:05 AM   Result Value Ref Range    Glucose (POC) 77 65 - 117 mg/dL    Performed by Zannie Fothergill PCT    GLUCOSE, POC    Collection Time: 02/06/23 11:32 AM   Result Value Ref Range    Glucose (POC) 98 65 - 117 mg/dL    Performed by Alberta Elmore PCT                  Time spent 35 minutes involving direct patient care as well as reviewing patient's labs and coordination of care with nursing staff     Care Plan discussed with: Patient/Family/RN/Case Management        Total time spent with patient: 35 minutes.

## 2023-02-06 NOTE — PROGRESS NOTES
End of Shift Note    Bedside shift change report given to PAN Dodson (oncoming nurse) by Dottie Barthel, LPN (offgoing nurse). Report included the following information SBAR, Kardex, MAR, Recent Results, and Cardiac Rhythm nsr    Shift worked:  7p-7a     Shift summary and any significant changes:    -Poor fluid intake  -Encouraged P.O. intake  -Unable to swallow Mucinex, medication can't be crushed  -Bladder scanned 72ml       Concerns for physician to address: PATIENT DID NOT MUCH LUNCH OR BREAKFAST. DINNER BS WAS 90 AND CLIENT HAS HAD A HISTORY OF DROPPING.    -Unable to swallow Mucinex, change to liquid if applicable   Zone phone for oncoming shift:   [phone]       Activity:  Activity Level: Bed Rest  Number times ambulated in hallways past shift: 0  Number of times OOB to chair past shift: 0    Cardiac:   Cardiac Monitoring: Yes      Cardiac Rhythm: Sinus Rhythm/Curtis    Access:  Current line(s): PIV     Genitourinary:   Urinary status: straight cath    Respiratory:   O2 Device: None (Room air)  Chronic home O2 use?: NO  Incentive spirometer at bedside: YES       GI:  Last Bowel Movement Date: 02/06/23  Current diet:  DIET ONE TIME MESSAGE  ADULT DIET Dysphagia - Pureed  Passing flatus: YES  Tolerating current diet: NO       Pain Management:   Patient states pain is manageable on current regimen: YES    Skin:  Mikey Score: 14  Interventions: float heels, increase time out of bed, and internal/external urinary devices    Patient Safety:  Fall Score:    Interventions: bed/chair alarm and gripper socks       Length of Stay:  Expected LOS: - - -  Actual LOS: [address], LPN

## 2023-02-06 NOTE — PROGRESS NOTES
End of Shift Note    Bedside shift change report given to Solange Lozano RN (oncoming nurse) by Aniyah Hawkins RN (offgoing nurse). Report included the following information SBAR and Kardex    Shift worked:  days   Shift summary and any significant changes:     Patient had very poor appetite and slept most of the day. Representative from [address] [place] [address] living came to see patient. Concerns for physician to address:  See above   Zone phone for oncoming shift:   [phone]     Patient Information  Renetta Ellington  80 y.o.  2/3/2023  9:02 AM by Sylvia Jaimes MD. Renetta Ellington was [facility] from [facility]    Problem List  Patient Active Problem List    Diagnosis Date Noted    History of hemorrhagic stroke with residual hemiplegia ([facility] [address] 02/04/2023    Hemiplegia of left nondominant side as late effect of cerebrovascular disease ([facility] [address] 02/04/2023    Stroke-like episode 02/03/2023     Past Medical History:   Diagnosis Date    Atrial fibrillation ([address]     CVA (cerebral vascular accident) ([address]     GERD (gastroesophageal reflux disease)     Glaucoma     HTN (hypertension)     Hyperlipidemia        Core Measures:  CVA: Yes Yes  CHF:No No  PNA:No No    Activity:  Activity Level: Bed Rest  Number times ambulated in hallways past shift: 0  Number of times OOB to chair past shift: 0    Cardiac:   Cardiac Monitoring: No      Cardiac Rhythm: Sinus Amanda Wilcox    Access:   Current line(s): PIV   Central Line? No Placement date  Reason Medically Necessary   PICC LINE? No Placement date Reason Medically Necessary     Genitourinary:   Urinary status: voiding  Urinary Catheter?  No Placement Date  Reason Medically Necessary     Respiratory:   O2 Device: None (Room air)  Chronic home O2 use?: NO  Incentive spirometer at bedside: NO       GI:  Last Bowel Movement Date: 02/06/23  Current diet:  DIET ONE TIME MESSAGE  ADULT DIET Dysphagia - Pureed  Passing flatus: YES  Tolerating current diet: YES       Pain Management:   Patient states pain is manageable on current regimen: YES    Skin:  Mikey Score: 15  Interventions: increase time out of bed, PT/OT consult, and nutritional support     Patient Safety:  Fall Score:    Interventions: bed/chair alarm, assistive device (walker, cane, etc), gripper socks, and pt to call before getting OOB     @Rollbelt  @dexterity to release roll belt  Yes/No ( must document dexterity  here by stating Yes or No here, otherwise this is a restraint and must follow restraint documentation policy.)    DVT prophylaxis:  DVT prophylaxis Med- No  DVT prophylaxis SCD or JOSELO- No     Wounds: (If Applicable)  Wounds- No  Location     Active Consults:  IP CONSULT TO NEUROLOGY  IP CONSULT TO CARDIOLOGY    Length of Stay:  Expected LOS: 2d 21h  Actual LOS: 3  Discharge Plan: Yes Pending placement      Aleida Samaniego RN

## 2023-02-06 NOTE — PROGRESS NOTES
Neurology Progress Note    Patient ID:  Verna Miguel  [id number]  80 y.o.  6/14/1932      CHIEF COMPLAINT: Increased left-sided weakness    Subjective:      Patient has complaints of increased left-sided weakness that occurred after she was just discharged from [facility] less than a week ago with acute stroke in the right basal ganglia area that now on MRI scan shows hemorrhagic transformation which is causing her increased weakness on the left side. She was on Xarelto and has been discontinued, and she is now on just a baby aspirin. She has a history of GI bleed in the past, we have reassured her that she could take a coated baby aspirin safely since has been 1 year since her stomach problem. She has a fairly flaccid or dense left hemiplegia now. She does not complain of any headache and we discussed her condition with the patient and her son in detail. We will repeat a CT tomorrow in the morning and she might be able to go back to her rehab center to repeat another CT scan in another week or 2 if tomorrow's are stable. Patient's CT scan was stable today, no increase in the hemorrhage, and she remains the same on exam with fairly dense left hemiplegia and she is almost flaccid on the left, but not complaining of any headache or other new neurological symptoms. Cardiology feels weak and holds her anticoagulation for 4 weeks, and we should see her back in clinic first and get a CT scan before we restart anticoagulation to make sure there is complete resolution of the hemorrhage.   Otherwise she is probably close to discharge back to rehab    Current Facility-Administered Medications   Medication Dose Route Frequency    amiodarone (CORDARONE) tablet 100 mg  100 mg Oral DAILY    lactulose (CHRONULAC) 10 gram/15 mL solution 15 mL  10 g Oral TID    melatonin tablet 3 mg  3 mg Oral QHS PRN    sodium chloride (NS) flush 5-10 mL  5-10 mL IntraVENous PRN    aspirin chewable tablet 81 mg  81 mg Oral DAILY latanoprost (XALATAN) 0.005 % ophthalmic solution 1 Drop  1 Drop Both Eyes QHS    levothyroxine (SYNTHROID) tablet 75 mcg  75 mcg Oral ACB    [Held by provider] losartan (COZAAR) tablet 100 mg  100 mg Oral DAILY    amLODIPine (NORVASC) tablet 5 mg  5 mg Oral DAILY    timolol (TIMOPTIC) 0.5 % ophthalmic solution 1 Drop  1 Drop Both Eyes BID    pantoprazole (PROTONIX) tablet 40 mg  40 mg Oral ACB    [Held by provider] rivaroxaban (XARELTO) tablet 15 mg  15 mg Oral DAILY    therapeutic multivitamin (THERAGRAN) tablet 1 Tablet  1 Tablet Oral DAILY    acetaminophen (TYLENOL) tablet 650 mg  650 mg Oral Q4H PRN    Or    acetaminophen (TYLENOL) solution 650 mg  650 mg Per NG tube Q4H PRN    Or    acetaminophen (TYLENOL) suppository 650 mg  650 mg Rectal Q4H PRN    magnesium hydroxide (MILK OF MAGNESIA) 400 mg/5 mL oral suspension 30 mL  30 mL Oral DAILY PRN    guaiFENesin ER (MUCINEX) tablet 600 mg  600 mg Oral Q12H    benzonatate (TESSALON) capsule 100 mg  100 mg Oral TID PRN    acetaminophen (TYLENOL) tablet 500 mg  500 mg Oral Q6H PRN    insulin lispro (HUMALOG) injection   SubCUTAneous AC&HS    glucose chewable tablet 16 g  4 Tablet Oral PRN    glucagon (GLUCAGEN) injection 1 mg  1 mg IntraMUSCular PRN    dextrose 10% infusion 0-250 mL  0-250 mL IntraVENous PRN        Past Medical History:   Diagnosis Date    Atrial fibrillation (HCC)     CVA (cerebral vascular accident) ([facility] [place] [address]     GERD (gastroesophageal reflux disease)     Glaucoma     HTN (hypertension)     Hyperlipidemia        No past surgical history on file.     [unfilled]    Social History     Tobacco Use    Smoking status: Never    Smokeless tobacco: Never   Substance Use Topics    Alcohol use: Not Currently       Current Facility-Administered Medications   Medication Dose Route Frequency Provider Last Rate Last Admin    amiodarone (CORDARONE) tablet 100 mg  100 mg Oral DAILY Francisco Bolivar MD   100 mg at 02/06/23 0947    lactulose (CHRONULAC) 10 gram/15 mL solution 15 mL  10 g Oral TID Graciela Washington MD   15 mL at 02/06/23 0946    melatonin tablet 3 mg  3 mg Oral QHS PRN Maynor Mcallister PA-C        sodium chloride (NS) flush 5-10 mL  5-10 mL IntraVENous PRN Jamison Bush MD   10 mL at 02/06/23 0430    aspirin chewable tablet 81 mg  81 mg Oral DAILY Jamison Bush MD   81 mg at 02/06/23 0947    latanoprost (XALATAN) 0.005 % ophthalmic solution 1 Drop  1 Drop Both Eyes QHS Jamison Bush MD   1 Drop at 02/05/23 2257    levothyroxine (SYNTHROID) tablet 75 mcg  75 mcg Oral ACB Jamison Bush MD   75 mcg at 02/06/23 0947    [Held by provider] losartan (COZAAR) tablet 100 mg  100 mg Oral DAILY Jamison Bush MD        amLODIPine (NORVASC) tablet 5 mg  5 mg Oral DAILY Jamison Bush MD   5 mg at 02/06/23 0946    timolol (TIMOPTIC) 0.5 % ophthalmic solution 1 Drop  1 Drop Both Eyes BID Jmaison Bush MD   1 Drop at 02/06/23 1023    pantoprazole (PROTONIX) tablet 40 mg  40 mg Oral ACB Jamison Bush MD   40 mg at 02/06/23 0947    [Held by provider] rivaroxaban (XARELTO) tablet 15 mg  15 mg Oral DAILY Jamison Bush MD        therapeutic multivitamin [facility]) tablet 1 Tablet  1 Tablet Oral DAILY Jamison Bush MD   1 Tablet at 02/06/23 3260    acetaminophen (TYLENOL) tablet 650 mg  650 mg Oral Q4H PRN Jamison Bush MD   650 mg at 02/05/23 1220    Or    acetaminophen (TYLENOL) solution 650 mg  650 mg Per NG tube Q4H PRN Jamison Bush MD        Or    acetaminophen (TYLENOL) suppository 650 mg  650 mg Rectal Q4H PRN Jamison Bush MD        magnesium hydroxide (MILK OF MAGNESIA) 400 mg/5 mL oral suspension 30 mL  30 mL Oral DAILY PRN Jamison Bush MD        guaiFENesin ER [facility]) tablet 600 mg  600 mg Oral Q12H Jamison Bush MD   600 mg at 02/05/23 9995    benzonatate (TESSALON) capsule 100 mg  100 mg Oral TID PRN Jamison Bush MD        acetaminophen (TYLENOL) tablet 500 mg  500 mg Oral Q6H PRN Merlinda Barry, MD        insulin lispro (HUMALOG) injection   SubCUTAneous AC&HS Merlinda Barry, MD   2 Units at 02/04/23 1147    glucose chewable tablet 16 g  4 Tablet Oral PRN Merlinda Barry, MD        glucagon [facility]) injection 1 mg  1 mg IntraMUSCular PRN Merlinda Barry, MD        dextrose 10% infusion 0-250 mL  0-250 mL IntraVENous PRN Merlinda Barry, MD           No Known Allergies    Review of Systems:    A comprehensive review of systems was negative except for: Neurological: positive for paresthesia and weakness    Objective:    Objective:     Patient Vitals for the past 24 hrs:   BP Temp Pulse Resp SpO2   02/06/23 1454 (!) 166/72 98.4 °F (36.9 °C) (!) 49 17 --   02/06/23 0807 (!) 159/67 97.5 °F (36.4 °C) (!) 53 20 100 %   02/06/23 0426 (!) 142/72 97.7 °F (36.5 °C) (!) 56 18 100 %   02/05/23 2352 (!) 150/61 97.7 °F (36.5 °C) (!) 52 16 100 %   02/05/23 2242 (!) 157/63 97.7 °F (36.5 °C) 68 18 100 %         Lab Review   Recent Results (from the past 24 hour(s))   GLUCOSE, POC    Collection Time: 02/05/23 10:08 PM   Result Value Ref Range    Glucose (POC) 91 65 - 117 mg/dL    Performed by Anila Tapia PCT    GLUCOSE, POC    Collection Time: 02/06/23 10:05 AM   Result Value Ref Range    Glucose (POC) 77 65 - 117 mg/dL    Performed by Marietta John PCT    GLUCOSE, POC    Collection Time: 02/06/23 11:32 AM   Result Value Ref Range    Glucose (POC) 98 65 - 117 mg/dL    Performed by Marietta John PCT    GLUCOSE, POC    Collection Time: 02/06/23  5:08 PM   Result Value Ref Range    Glucose (POC) 74 65 - 117 mg/dL    Performed by Marietta John PCT            Additional comments:I personally viewed and interpreted the patient's MRI scan and CT scans  MRI Results (most recent):  Results from [place] encounter on 02/03/23    MRI BRAIN W WO CONT    Narrative  PRELIMINARY REPORT  No emergent process.  Restricted diffusion surrounding the area of enhancement  previously identified    Preliminary report was provided by Dr. Daron Coombs  Final report to follow. END PRELIMINARY REPORT      EXAM:  MRI BRAIN W WO CONT    INDICATION:    Abnormal ring-enhancing appearance of the right caudate and  putamen, recent CVA 3wks ago    COMPARISON:  CTA head neck 2/3/2023. CONTRAST: 12 ml ProHance. TECHNIQUE:  Multiplanar multisequence acquisition without and with contrast of the brain. FINDINGS:  Subacute infarct in the right corona radiata/basal ganglia with associated  subacute hematomas within the right caudate, putamen and globus pallidus with  associated peripheral enhancement within the right caudate and putamen. Generalized parenchymal volume loss with commensurate dilation of the sulci and  ventricular system. Incidental cavum vergae. Confluent areas of periventricular  and deep white matter T2/FLAIR hyperintensity, consistent with moderate to  severe chronic microvascular ischemic disease. Tiny chronic infarct in the right  cerebellum. There is no cerebellar tonsillar herniation. Expected arterial  flow-voids are present. Incidental small right cerebellar developmental venous  anomaly. The paranasal sinuses are clear. Trace left mastoid effusion. The orbital  contents are within normal limits with bilateral lens implants. No significant  osseous or scalp lesions are identified. Grade 1 anterolisthesis of C3 on C4  secondary to severe facet arthropathy in the cervical spine, with multilevel  degenerative disc disease also noted. Impression  1. Subacute hemorrhage with associated peripheral enhancement throughout the  right basal ganglia, associated with a subacute infarct in the right corona  radiata/basal ganglia. Follow-up brain MRI in 6-8 weeks is recommended to  establish expected evolution of the suspected subacute infarct with hemorrhagic  conversion. 2. Generalized parenchymal volume loss and moderate to severe chronic  microvascular ischemic disease.  Tiny chronic infarct in the right cerebellum. Results from [place] encounter on 02/03/23    MRI BRAIN W WO CONT    Narrative  PRELIMINARY REPORT  No emergent process. Restricted diffusion surrounding the area of enhancement  previously identified    Preliminary report was provided by Dr. Garner Factor  Final report to follow. END PRELIMINARY REPORT      EXAM:  MRI BRAIN W WO CONT    INDICATION:    Abnormal ring-enhancing appearance of the right caudate and  putamen, recent CVA 3wks ago    COMPARISON:  CTA head neck 2/3/2023. CONTRAST: 12 ml ProHance. TECHNIQUE:  Multiplanar multisequence acquisition without and with contrast of the brain. FINDINGS:  Subacute infarct in the right corona radiata/basal ganglia with associated  subacute hematomas within the right caudate, putamen and globus pallidus with  associated peripheral enhancement within the right caudate and putamen. Generalized parenchymal volume loss with commensurate dilation of the sulci and  ventricular system. Incidental cavum vergae. Confluent areas of periventricular  and deep white matter T2/FLAIR hyperintensity, consistent with moderate to  severe chronic microvascular ischemic disease. Tiny chronic infarct in the right  cerebellum. There is no cerebellar tonsillar herniation. Expected arterial  flow-voids are present. Incidental small right cerebellar developmental venous  anomaly. The paranasal sinuses are clear. Trace left mastoid effusion. The orbital  contents are within normal limits with bilateral lens implants. No significant  osseous or scalp lesions are identified. Grade 1 anterolisthesis of C3 on C4  secondary to severe facet arthropathy in the cervical spine, with multilevel  degenerative disc disease also noted. Impression  1. Subacute hemorrhage with associated peripheral enhancement throughout the  right basal ganglia, associated with a subacute infarct in the right corona  radiata/basal ganglia.  Follow-up brain MRI in 6-8 weeks is recommended to  establish expected evolution of the suspected subacute infarct with hemorrhagic  conversion. 2. Generalized parenchymal volume loss and moderate to severe chronic  microvascular ischemic disease. Tiny chronic infarct in the right cerebellum. NEUROLOGICAL EXAM:    Appearance: The patient is well developed, well nourished, provides a coherent history and is in no acute distress. Mental Status: Oriented to time, place and person and the president, cognitive function and fund of knowledge is probably normal for her age. Speech is fluent without aphasia or dysarthria. Mood and affect appropriate. Cranial Nerves:   Intact visual fields. Fundi are benign, discs are flat, no lesions seen on funduscopy. ROVERTO, EOM's full, no nystagmus, no ptosis. Facial sensation is normal. Corneal reflexes are not tested. Facial movement is weak on the left. Hearing is normal bilaterally. Palate is midline with normal sternocleidomastoid and trapezius muscles are normal. Tongue is midline. Neck without meningismus or bruits   Motor:  5/5 strength in upper and lower proximal and distal muscles on the right, but dense almost flaccid left hemiplegia persists. Normal bulk and tone. No fasciculations. Rapid alternating movement is symmetric and intact in all extremities   Reflexes:   Deep tendon reflexes 1+/4 and symmetrical.  No babinski or clonus present   Sensory:   Normal to touch, pinprick and temperature and vibration. DSS is intact   Gait:  Not testable    Tremor:   No tremor noted. Cerebellar:  Not tested abnormal cerebellar signs present on Romberg, tandem, and finger-nose-finger exam.   Neurovascular:  Abnormal heart sounds and irregular rhythm, peripheral pulses decreased and no carotid bruits. Assessment:         ICD-10-CM ICD-9-CM    1. Encephalopathy [G93.40 (ICD-10-CM)]  G93.40 348.30       2. Stroke-like episode [R29.90 (ICD-10-CM)]  R29.90 781.99       3.  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  We will repeat her CT scan tomorrow to make sure there is no enlarging hematoma, and if that is stable we can probably let her go back to rehab and repeat her scan in a week to 2 as an outpatient. Discussed with the patient and her son in detail and they agree with plans and therapy. Discussed with nursing staff also. Continue excellent medical care, 45 minutes spent with the patient going over her chest, reviewing her scans personally, and discussing her condition with the patient and wife and the staff all in detail. Patient's CT scan was stable today, no increase in the hemorrhage, and she remains the same on exam with fairly dense left hemiplegia and she is almost flaccid on the left, but not complaining of any headache or other new neurological symptoms. Cardiology feels weak and holds her anticoagulation for 4 weeks, and we should see her back in clinic first and get a CT scan before we restart anticoagulation to make sure there is complete resolution of the hemorrhage.   Otherwise she is probably close to discharge back to rehab  35 minutes spent with the patient reviewing her records, personally reviewing her cardiology note and talking to cardiology about timing of anticoagulation, and reviewing her CT scan personally on the PACS system also, and I agree with report as dictated, and discussing her case with the nursing staff, very important that we make sure there is no progression of her intracranial hemorrhage      Signed:  Al Denny MD  2/6/2023  4:19 PM    None  None

## 2023-02-06 NOTE — PROGRESS NOTES
Problem: Communication Impaired (Adult)  Goal: *Acute Goals and Plan of Care (Insert Text)  Description: Speech pathology goals  Initiated 2/4/2023   1. Patient will achieve 80% intelligibility with functional conversation within 7 days  Outcome: Not Progressing Towards Goal   SPEECH LANGUAGE PATHOLOGY TREATMENT  Patient: Alicja Johnson (24 y.o. female)  Date: 2/6/2023  Diagnosis: Stroke-like episode [R29.90] <principal problem not specified>        ASSESSMENT:  The patient has a mild to mod dysarthria characterized by reduced vocal intensity, reduced breath support and imprecise artic. It affects her overall intelligibility. The patient could imitate at short sentence level with 50% acc. She is slow processing and confused. [facility] stated date as Sept , 2003. She could not state where she had been previously other than [facility]. Not sure if this is accurate. Poor insight into therapy goals. PLAN:  Patient continues to benefit from skilled intervention to address the above impairments. Continue treatment per established plan of care. Discharge Recommendations: To Be Determined     SUBJECTIVE:   Patient stated she has been so many places since her stroke. OBJECTIVE:   Mental Status:  Neurologic State: Drowsy  Orientation Level: Disoriented to time, Oriented to person, Oriented to place  Cognition: Follows commands, Appropriate safety awareness  Perception: Cues to maintain midline in sitting  Perseveration: No perseveration noted  Safety/Judgement: Awareness of environment, Decreased insight into deficits, Fall prevention  Treatment & Interventions: Motor Speech:     Intelligibility: Impaired    Dysarthric Characteristics: Blended word boundaries; Decreased breath support; Imprecise (reduced intensity)     Language Comprehension and Expression:     Verbal Expression  Verbal Expression  Primary Mode of Expression: Verbal  Initiation: No impairment  Voice:   Low volume     Response & Tolerance to Activities:   fair    Pain:  Pain Scale 1: Numeric (0 - 10)  Pain Intensity 1: 0       After treatment:   Patient left in no apparent distress in bed    COMMUNICATION/EDUCATION:   She was asked to use slow, loud and clearer speech. The patient's plan of care including recommendations, planned interventions, and recommended diet changes were discussed with: Registered nurse.      CINDI Dumont  Time Calculation: 15 mins

## 2023-02-06 NOTE — PROGRESS NOTES
Progress Note      2/6/2023 11:26 AM  NAME: Benjy De Paz   MRN:  [id number]   Admit Diagnosis: Stroke-like episode [R29.90]                Assessment:       - Recent CVA with hemorragic conversion  - AMS     - CAD with 1V CABG, stress in 2016 no ischemia  - Echo 1/2023 EF 55%, mild AI, mild MR  - PAF with hx of bradycardia  - HTN  - Dyslipidemia  - Carotid 1/2023 mild  - Hypothyroid  - GERD     Cardiologist: Rony Olmedo                     Plan:        - Negative enyzmes  - Euvolemic  - Paroxysmal afib, with maggie when in sinus     - Start xarelto in 4weeks per neurolgy  - Decrease amiodarone from 400mg to 100mg due to bradycardia  - Stop metoprolol  - Resume amlodipine  - Hold losartan, resume as able     Will sign off, please call with cardiology concerns  Follow up Rony Olmedo to see if watchman candidate          [x]       High complexity decision making was performed in this patient at high risk for decompensation with multiple organ involvement. We discussed the expected course, resolution and complications of the diagnoses in detail. Medication risk, benefits, costs, interactions, and alternatives were discussed as indicated. I advised him to contact the office if his condition worsens, changes or fails to improve as anticipated. Patient expressed understanding with the diagnoses  and plan. Subjective:     Gisell CHAPARRO Jason denies chest pain, dyspnea. Discussed with RN events overnight. Review of Systems:    Symptom Y/N Comments  Symptom Y/N Comments   Fever/Chills N   Chest Pain N    Poor Appetite N   Edema N    Cough N   Abdominal Pain N    Sputum N   Joint Pain N    SOB/MARTIN N   Pruritis/Rash N    Nausea/vomit N   Tolerating PT/OT Y    Diarrhea N   Tolerating Diet Y    Constipation N   Other       Could NOT obtain due to:      Objective:      Physical Exam:    Last 24hrs VS reviewed since prior progress note.  Most recent are:    Visit Vitals  BP (!) 142/72   Pulse (!) 56   Temp 97.7 °F (36.5 °C)   Resp 18   Ht 5' 8\" (1.727 m)   Wt 60.7 kg (133 lb 12.8 oz)   SpO2 100%   BMI 20.34 kg/m²       Intake/Output Summary (Last 24 hours) at 2/6/2023 7866  Last data filed at 2/5/2023 1905  Gross per 24 hour   Intake --   Output 250 ml   Net -250 ml          General Appearance: Well developed, well nourished, alert & oriented x 3,    no acute distress. Ears/Nose/Mouth/Throat: Hearing grossly normal.  Neck: Supple. Chest: Lungs clear to auscultation bilaterally. Cardiovascular: Regular rate and rhythm, S1S2 normal, no murmur. Abdomen: Soft, non-tender, bowel sounds are active. Extremities: No edema bilaterally. Skin: Warm and dry. PMH/SH reviewed - no change compared to H&P    Data Review    Telemetry: normal sinus rhythm     Lab Data Personally Reviewed:    Recent Labs     02/05/23 0333 02/03/23  0925   WBC 4.2 5.2   HGB 12.2 12.8   HCT 35.1 37.5    449*       Recent Labs     02/03/23  1001   INR 1.5*   PTP 15.2*        Recent Labs     02/05/23  0333 02/03/23  0925    141   K 3.9 3.5   * 109*   CO2 25 28   BUN 25* 26*   CREA 0.76 0.91   * 100   CA 9.1 9.3       No results for input(s): CPK, CKNDX, TROIQ in the last 72 hours. No lab exists for component: CPKMB  Lab Results   Component Value Date/Time    Cholesterol, total 164 02/04/2023 02:47 AM    HDL Cholesterol 43 02/04/2023 02:47 AM    LDL, calculated 96.4 02/04/2023 02:47 AM    Triglyceride 123 02/04/2023 02:47 AM    CHOL/HDL Ratio 3.8 02/04/2023 02:47 AM       Recent Labs     02/03/23  0925   AP 90   TP 7.1   ALB 2.7*   GLOB 4.4*       No results for input(s): PH, PCO2, PO2 in the last 72 hours.     Medications Personally Reviewed:    Current Facility-Administered Medications   Medication Dose Route Frequency    amiodarone (CORDARONE) tablet 100 mg  100 mg Oral DAILY    lactulose (CHRONULAC) 10 gram/15 mL solution 15 mL  10 g Oral TID    melatonin tablet 3 mg  3 mg Oral QHS PRN    sodium chloride (NS) flush 5-10 mL 5-10 mL IntraVENous PRN    aspirin chewable tablet 81 mg  81 mg Oral DAILY    latanoprost (XALATAN) 0.005 % ophthalmic solution 1 Drop  1 Drop Both Eyes QHS    levothyroxine (SYNTHROID) tablet 75 mcg  75 mcg Oral ACB    [Held by provider] losartan (COZAAR) tablet 100 mg  100 mg Oral DAILY    amLODIPine (NORVASC) tablet 5 mg  5 mg Oral DAILY    timolol (TIMOPTIC) 0.5 % ophthalmic solution 1 Drop  1 Drop Both Eyes BID    pantoprazole (PROTONIX) tablet 40 mg  40 mg Oral ACB    [Held by provider] rivaroxaban (XARELTO) tablet 15 mg  15 mg Oral DAILY    therapeutic multivitamin (THERAGRAN) tablet 1 Tablet  1 Tablet Oral DAILY    acetaminophen (TYLENOL) tablet 650 mg  650 mg Oral Q4H PRN    Or    acetaminophen (TYLENOL) solution 650 mg  650 mg Per NG tube Q4H PRN    Or    acetaminophen (TYLENOL) suppository 650 mg  650 mg Rectal Q4H PRN    magnesium hydroxide (MILK OF MAGNESIA) 400 mg/5 mL oral suspension 30 mL  30 mL Oral DAILY PRN    guaiFENesin ER (MUCINEX) tablet 600 mg  600 mg Oral Q12H    benzonatate (TESSALON) capsule 100 mg  100 mg Oral TID PRN    acetaminophen (TYLENOL) tablet 500 mg  500 mg Oral Q6H PRN    insulin lispro (HUMALOG) injection   SubCUTAneous AC&HS    glucose chewable tablet 16 g  4 Tablet Oral PRN    glucagon (GLUCAGEN) injection 1 mg  1 mg IntraMUSCular PRN    dextrose 10% infusion 0-250 mL  0-250 mL IntraVENous PRN         Macrina Rodriguez MD

## 2023-02-06 NOTE — PROGRESS NOTES
End of Shift Note    Bedside shift change report given to GEORGIA MORROW (oncoming nurse) by Wilma Stewart RN (offgoing nurse). Report included the following information SBAR, Kardex, MAR, Recent Results, and Cardiac Rhythm nsr    Shift worked:  7A-7P     Shift summary and any significant changes:     PATIENT WAS STRAIGHT CATH 250CC OBTAINED. MD D/C FLUIDS. PATIENT IS NOT REALLY EATING OR DRINKING. CONCERNED FOR HYPOGLYCEMIA. Concerns for physician to address: PATIENT DID NOT MUCH LUNCH OR BREAKFAST. DINNER BS WAS 90 AND CLIENT HAS HAD A HISTORY OR DROPPING.       Zone phone for oncoming shift:   0       Activity:  Activity Level: Bed Rest  Number times ambulated in hallways past shift: 0  Number of times OOB to chair past shift: 0    Cardiac:   Cardiac Monitoring: Yes      Cardiac Rhythm: Sinus Rhythm    Access:  Current line(s): PIV     Genitourinary:   Urinary status: straight cath    Respiratory:   O2 Device: None (Room air)  Chronic home O2 use?: NO  Incentive spirometer at bedside: YES       GI:  Last Bowel Movement Date: 02/06/23  Current diet:  DIET ONE TIME MESSAGE  ADULT DIET Dysphagia - Pureed  Passing flatus: YES  Tolerating current diet: NO       Pain Management:   Patient states pain is manageable on current regimen: YES    Skin:  Mikey Score: 15  Interventions: float heels, increase time out of bed, and internal/external urinary devices    Patient Safety:  Fall Score:    Interventions: bed/chair alarm and gripper socks       Length of Stay:  Expected LOS: - - -  Actual LOS: 2      Wilma Stewart RN

## 2023-02-07 LAB
GLUCOSE BLD STRIP.AUTO-MCNC: 103 MG/DL (ref 65–117)
GLUCOSE BLD STRIP.AUTO-MCNC: 104 MG/DL (ref 65–117)
GLUCOSE BLD STRIP.AUTO-MCNC: 111 MG/DL (ref 65–117)
GLUCOSE BLD STRIP.AUTO-MCNC: 132 MG/DL (ref 65–117)
GLUCOSE BLD STRIP.AUTO-MCNC: 89 MG/DL (ref 65–117)
SERVICE CMNT-IMP: ABNORMAL
SERVICE CMNT-IMP: NORMAL

## 2023-02-07 PROCEDURE — 74011250637 HC RX REV CODE- 250/637: Performed by: INTERNAL MEDICINE

## 2023-02-07 PROCEDURE — 74011250637 HC RX REV CODE- 250/637: Performed by: STUDENT IN AN ORGANIZED HEALTH CARE EDUCATION/TRAINING PROGRAM

## 2023-02-07 PROCEDURE — 97110 THERAPEUTIC EXERCISES: CPT

## 2023-02-07 PROCEDURE — 82962 GLUCOSE BLOOD TEST: CPT

## 2023-02-07 PROCEDURE — 65270000046 HC RM TELEMETRY

## 2023-02-07 PROCEDURE — 51798 US URINE CAPACITY MEASURE: CPT

## 2023-02-07 RX ORDER — ATORVASTATIN CALCIUM 10 MG/1
10 TABLET, FILM COATED ORAL
Status: DISCONTINUED | OUTPATIENT
Start: 2023-02-07 | End: 2023-02-07

## 2023-02-07 RX ORDER — ATORVASTATIN CALCIUM 20 MG/1
20 TABLET, FILM COATED ORAL
Status: DISCONTINUED | OUTPATIENT
Start: 2023-02-07 | End: 2023-02-13 | Stop reason: HOSPADM

## 2023-02-07 RX ADMIN — ASPIRIN 81 MG: 81 TABLET, CHEWABLE ORAL at 09:20

## 2023-02-07 RX ADMIN — LACTULOSE 15 ML: 20 SOLUTION ORAL at 16:00

## 2023-02-07 RX ADMIN — GUAIFENESIN 600 MG: 600 TABLET, EXTENDED RELEASE ORAL at 09:20

## 2023-02-07 RX ADMIN — LATANOPROST 1 DROP: 50 SOLUTION OPHTHALMIC at 21:39

## 2023-02-07 RX ADMIN — AMIODARONE HYDROCHLORIDE 100 MG: 200 TABLET ORAL at 09:20

## 2023-02-07 RX ADMIN — TIMOLOL MALEATE 1 DROP: 5 SOLUTION OPHTHALMIC at 09:22

## 2023-02-07 RX ADMIN — LEVOTHYROXINE SODIUM 75 MCG: 0.07 TABLET ORAL at 09:20

## 2023-02-07 RX ADMIN — THERA TABS 1 TABLET: TAB at 09:20

## 2023-02-07 RX ADMIN — LACTULOSE 15 ML: 20 SOLUTION ORAL at 09:21

## 2023-02-07 RX ADMIN — ATORVASTATIN CALCIUM 20 MG: 20 TABLET, FILM COATED ORAL at 21:28

## 2023-02-07 RX ADMIN — LACTULOSE 15 ML: 20 SOLUTION ORAL at 21:39

## 2023-02-07 RX ADMIN — AMLODIPINE BESYLATE 5 MG: 5 TABLET ORAL at 09:20

## 2023-02-07 RX ADMIN — PANTOPRAZOLE SODIUM 40 MG: 40 TABLET, DELAYED RELEASE ORAL at 09:20

## 2023-02-07 RX ADMIN — GUAIFENESIN 600 MG: 600 TABLET, EXTENDED RELEASE ORAL at 21:28

## 2023-02-07 NOTE — PROGRESS NOTES
End of Shift Note    Bedside shift change report given to Inocencio Cerda RN (oncoming nurse) by Rufino Mercado RN (offgoing nurse). Report included the following information SBAR and Kardex    Shift worked: Nights    Shift summary and any significant changes:    No significant events over the nights        Concerns for physician to address:  See above   Zone phone for oncoming shift:   [phone]     Patient Information  Yulia Hays  80 y.o.  2/3/2023  9:02 AM by Macrina Martínez MD. Yulia Hays was admitted from [facility]    Problem List  Patient Active Problem List    Diagnosis Date Noted    History of hemorrhagic stroke with residual hemiplegia ([facility] 02/04/2023    Hemiplegia of left nondominant side as late effect of cerebrovascular disease ([address] 02/04/2023    Stroke-like episode 02/03/2023     Past Medical History:   Diagnosis Date    Atrial fibrillation ([address]     CVA (cerebral vascular accident) ([address]     GERD (gastroesophageal reflux disease)     Glaucoma     HTN (hypertension)     Hyperlipidemia        Core Measures:  CVA: Yes Yes  CHF:No No  PNA:No No    Activity:  Activity Level: Bed Rest  Number times ambulated in hallways past shift: 0  Number of times OOB to chair past shift: 0    Cardiac:   Cardiac Monitoring: No      Cardiac Rhythm: Sinus Melvin Poor    Access:   Current line(s): PIV   Central Line? No Placement date  Reason Medically Necessary   PICC LINE? No Placement date Reason Medically Necessary     Genitourinary:   Urinary status: voiding  Urinary Catheter?  No Placement Date  Reason Medically Necessary     Respiratory:   O2 Device: None (Room air)  Chronic home O2 use?: NO  Incentive spirometer at bedside: NO       GI:  Last Bowel Movement Date: 02/06/23  Current diet:  DIET ONE TIME MESSAGE  ADULT DIET Dysphagia - Pureed  Passing flatus: YES  Tolerating current diet: YES       Pain Management:   Patient states pain is manageable on current regimen: YES    Skin:  Mikey Score: 15  Interventions: increase time out of bed, PT/OT consult, and nutritional support     Patient Safety:  Fall Score:    Interventions: bed/chair alarm, assistive device (walker, cane, etc), gripper socks, and pt to call before getting OOB     @Rollbelt  @dexterity to release roll belt  Yes/No ( must document dexterity  here by stating Yes or No here, otherwise this is a restraint and must follow restraint documentation policy.)    DVT prophylaxis:  DVT prophylaxis Med- No  DVT prophylaxis SCD or JOSELO- No     Wounds: (If Applicable)  Wounds- No  Location     Active Consults:  IP CONSULT TO NEUROLOGY  IP CONSULT TO CARDIOLOGY    Length of Stay:  Expected LOS: 2d 21h  Actual LOS: 4  Discharge Plan: Yes Pending placement      Theresa Yanes RN

## 2023-02-07 NOTE — PROGRESS NOTES
Hospitalist Progress Note    Subjective:   Daily Progress Note: 2/7/2023 2:24 PM    Hospital Course:  Yunier Wall is a 80 y.o.  female who presents with PMH of HTN, afib, CVA, DM, glaucoma, hypothyroidism, and GERD. Patient presenting from [facility] after episode of AMS this morning. Reportedly she fell from bed level yesterday and did not hit her head. Yesterday post fall she was fine at new baseline of bed bound with some dysarthria, however able to talk. This morning staff noted that she was altered by finding that she only responded to pain. Per son when he presented to ED, patient was back to her new baseline post CVA. Prior to stroke 3 weeks ago, she was living independently without a assisted walking device. She was driving and managing her ADLs independently with some short term memory difficulties. Since 1/2023 CVA she has been in rehab with left sided hemiplegia and left sided facial droop. MRI brain shows subacute hemorrhage associated with subacute infarct in right corona radiata/basal ganglia. Subjective:  Patient denies any new complaints. Spoke to patient's son and updated him about patient's condition. Assessment / Plan:  Acute encephalopathy:  Hemorrhagic transformation of recent stroke  Transient  MRI brain shows subacute hemorrhage associated with subacute infarct in right corona radiata/basal ganglia. Neurology recommends to be off for xarelto for 4-6 weeks. Has elevated ammonia level- started on lactulose  Repeat CT head on 2/5/23- stable. Repeat CT head in 1-2 week as outpatient. Continue aspirin,   Start statin    Cough:  Rhino and enterovirus +  Mucinex    Hypertension  Afib:  Hold xarelto in the setting of subacute hemorrhage  Hold  losartan, metoprolol  Resume amlodipine  Started on amiodarone at lower dose 100 mg po daily due to bradycardia  Appreciate cardiology consult  Outpatient cardiology follow up for watchman evaluation.      Diabetes mellitus:  Continue sliding scale    Glaucoma:  Continue eye drops        18.5 - 24.9 Normal weight / Body mass index is 20.34 kg/m². Code status: Full  Prophylaxis: SCD's  Recommended Disposition: SNF/LTC  JACKY: 2/823  Barrier: placement.        Current Facility-Administered Medications   Medication Dose Route Frequency    amiodarone (CORDARONE) tablet 100 mg  100 mg Oral DAILY    lactulose (CHRONULAC) 10 gram/15 mL solution 15 mL  10 g Oral TID    melatonin tablet 3 mg  3 mg Oral QHS PRN    sodium chloride (NS) flush 5-10 mL  5-10 mL IntraVENous PRN    aspirin chewable tablet 81 mg  81 mg Oral DAILY    latanoprost (XALATAN) 0.005 % ophthalmic solution 1 Drop  1 Drop Both Eyes QHS    levothyroxine (SYNTHROID) tablet 75 mcg  75 mcg Oral ACB    [Held by provider] losartan (COZAAR) tablet 100 mg  100 mg Oral DAILY    amLODIPine (NORVASC) tablet 5 mg  5 mg Oral DAILY    timolol (TIMOPTIC) 0.5 % ophthalmic solution 1 Drop  1 Drop Both Eyes BID    pantoprazole (PROTONIX) tablet 40 mg  40 mg Oral ACB    [Held by provider] rivaroxaban (XARELTO) tablet 15 mg  15 mg Oral DAILY    therapeutic multivitamin (THERAGRAN) tablet 1 Tablet  1 Tablet Oral DAILY    acetaminophen (TYLENOL) tablet 650 mg  650 mg Oral Q4H PRN    Or    acetaminophen (TYLENOL) solution 650 mg  650 mg Per NG tube Q4H PRN    Or    acetaminophen (TYLENOL) suppository 650 mg  650 mg Rectal Q4H PRN    magnesium hydroxide (MILK OF MAGNESIA) 400 mg/5 mL oral suspension 30 mL  30 mL Oral DAILY PRN    guaiFENesin ER (MUCINEX) tablet 600 mg  600 mg Oral Q12H    benzonatate (TESSALON) capsule 100 mg  100 mg Oral TID PRN    acetaminophen (TYLENOL) tablet 500 mg  500 mg Oral Q6H PRN    insulin lispro (HUMALOG) injection   SubCUTAneous AC&HS    glucose chewable tablet 16 g  4 Tablet Oral PRN    glucagon (GLUCAGEN) injection 1 mg  1 mg IntraMUSCular PRN    dextrose 10% infusion 0-250 mL  0-250 mL IntraVENous PRN        Review of Systems  Constitutional: No fevers, No chills, No sweats, No fatigue, No Weakness  Eyes: No redness  Ears, nose, mouth, throat, and face: No nasal congestion, No sore throat, No voice change  Respiratory: No Shortness of Breath, No cough, No wheezing  Cardiovascular: No chest pain, No palpitations, No extremity edema  Gastrointestinal: No nausea, No vomiting, No diarrhea, No abdominal pain  Genitourinary: No frequency, No dysuria, No hematuria  Integument/breast: No skin lesion(s)   Neurological: has mild Confusion, No headaches, No dizziness      Objective:     Visit Vitals  BP (!) 157/72 Comment: lethargic   Pulse (!) 53   Temp 97.9 °F (36.6 °C)   Resp 18   Ht 5' 8\" (1.727 m)   Wt 60.7 kg (133 lb 12.8 oz)   SpO2 100%   BMI 20.34 kg/m²      O2 Device: None (Room air)    Temp (24hrs), Av.1 °F (36.7 °C), Min:97.9 °F (36.6 °C), Max:98.4 °F (36.9 °C)      701 - 1900  In: 180 [P.O.:180]  Out: -   1901 -  07  In: -   Out: 250 [Urine:250]    PHYSICAL EXAM:  Constitutional: No acute distress  Skin: Extremities and face reveal no rashes. HEENT: Sclerae anicteric. Extra-occular muscles are intact. No oral ulcers. The neck is supple and no masses. Cardiovascular: Regular rate and rhythm. Respiratory:  Clear breath sounds bilaterally with no wheezes, rales, or rhonchi. GI: Abdomen nondistended, soft, and nontender. Normal active bowel sounds. Musculoskeletal: No pitting edema of the lower legs. Able to move all ext  Neurological:  Patient was sleepy this morning.    Psychiatric: Mood appears appropriate       Data Review    Recent Results (from the past 24 hour(s))   GLUCOSE, POC    Collection Time: 23  5:08 PM   Result Value Ref Range    Glucose (POC) 74 65 - 117 mg/dL    Performed by Kip Whiting PCT    GLUCOSE, POC    Collection Time: 23  8:50 PM   Result Value Ref Range    Glucose (POC) 108 65 - 117 mg/dL    Performed by Garrick Kam PCT    GLUCOSE, POC    Collection Time: 23  6:10 AM   Result Value Ref Range    Glucose (POC) 89 65 - 117 mg/dL    Performed by Rosa Hilton PCT    GLUCOSE, POC    Collection Time: 02/07/23  7:48 AM   Result Value Ref Range    Glucose (POC) 104 65 - 117 mg/dL    Performed by Bree Jay RN    GLUCOSE, POC    Collection Time: 02/07/23 11:31 AM   Result Value Ref Range    Glucose (POC) 132 (H) 65 - 117 mg/dL    Performed by Kwame Olmedo PCT                  Time spent 35 minutes involving direct patient care as well as reviewing patient's labs and coordination of care with nursing staff     Care Plan discussed with: Patient/Family/RN/Case Management        Total time spent with patient: 35 minutes.

## 2023-02-07 NOTE — PROGRESS NOTES
End of Shift Note    Bedside shift change report given to Inocencio Cerda RN (oncoming nurse) by Britt Birch RN (offgoing nurse). Report included the following information SBAR and Kardex    Shift worked: Nights    Shift summary and any significant changes:    No significant events over the nights     Pt voiding throughout the night      Concerns for physician to address:  See above   Zone phone for oncoming shift:   [phone]     Patient Information  Shannon Sargent  80 y.o.  2/3/2023  9:02 AM by Delia Paul MD. Shannon Sargent was admitted from [facility]    Problem List  Patient Active Problem List    Diagnosis Date Noted    History of hemorrhagic stroke with residual hemiplegia ([facility] 02/04/2023    Hemiplegia of left nondominant side as late effect of cerebrovascular disease ([address] 02/04/2023    Stroke-like episode 02/03/2023     Past Medical History:   Diagnosis Date    Atrial fibrillation ([address]     CVA (cerebral vascular accident) ([address]     GERD (gastroesophageal reflux disease)     Glaucoma     HTN (hypertension)     Hyperlipidemia        Core Measures:  CVA: Yes Yes  CHF:No No  PNA:No No    Activity:  Activity Level: Bed Rest  Number times ambulated in hallways past shift: 0  Number of times OOB to chair past shift: 0    Cardiac:   Cardiac Monitoring: No      Cardiac Rhythm: Sinus Evonne James    Access:   Current line(s): PIV   Central Line? No Placement date  Reason Medically Necessary   PICC LINE? No Placement date Reason Medically Necessary     Genitourinary:   Urinary status: voiding  Urinary Catheter?  No Placement Date  Reason Medically Necessary     Respiratory:   O2 Device: None (Room air)  Chronic home O2 use?: NO  Incentive spirometer at bedside: NO       GI:  Last Bowel Movement Date: 02/06/23  Current diet:  DIET ONE TIME MESSAGE  ADULT DIET Dysphagia - Pureed  Passing flatus: YES  Tolerating current diet: YES       Pain Management:   Patient states pain is manageable on current regimen: YES    Skin:  Mikey Score: 15  Interventions: increase time out of bed, PT/OT consult, and nutritional support     Patient Safety:  Fall Score:    Interventions: bed/chair alarm, assistive device (walker, cane, etc), gripper socks, and pt to call before getting OOB     @Rollbelt  @dexterity to release roll belt  Yes/No ( must document dexterity  here by stating Yes or No here, otherwise this is a restraint and must follow restraint documentation policy.)    DVT prophylaxis:  DVT prophylaxis Med- No  DVT prophylaxis SCD or JOSELO- No     Wounds: (If Applicable)  Wounds- No  Location     Active Consults:  IP CONSULT TO NEUROLOGY  IP CONSULT TO CARDIOLOGY    Length of Stay:  Expected LOS: 2d 21h  Actual LOS: 4  Discharge Plan: Yes Pending placement      Torie Mendez RN

## 2023-02-07 NOTE — PROGRESS NOTES
End of Shift Note     Bedside shift change report given to Siena RN (oncoming nurse) by Regine Garcia RN (offgoing nurse). Report included the following information SBAR and Kardex     Shift worked: days   Shift summary and any significant changes:     Very poor po intake,Dr. Marie Laboy made aware         Concerns for physician to address:  See above   Zone phone for oncoming shift:   [phone]      Patient Information  Patrick Tobar  80 y.o.  2/3/2023  9:02 AM by Dante Downing MD. Ptarick Tobar was admitted from [facility]     Problem List       Patient Active Problem List     Diagnosis Date Noted    History of hemorrhagic stroke with residual hemiplegia ([facility] 02/04/2023    Hemiplegia of left nondominant side as late effect of cerebrovascular disease ([facility] [address] 02/04/2023    Stroke-like episode 02/03/2023           Past Medical History:   Diagnosis Date    Atrial fibrillation ([address]      CVA (cerebral vascular accident) ([address]      GERD (gastroesophageal reflux disease)      Glaucoma      HTN (hypertension)      Hyperlipidemia           Core Measures:  CVA: Yes Yes  CHF:No No  PNA:No No     Activity:  Activity Level: Bed Rest  Number times ambulated in hallways past shift: 0  Number of times OOB to chair past shift: 0     Cardiac:   Cardiac Monitoring: No      Cardiac Rhythm: Sinus Wanna Sites     Access:   Current line(s): PIV   Central Line? No Placement date  Reason Medically Necessary   PICC LINE? No Placement date Reason Medically Necessary      Genitourinary:   Urinary status: voiding  Urinary Catheter?  No Placement Date  Reason Medically Necessary      Respiratory:   O2 Device: None (Room air)  Chronic home O2 use?: NO  Incentive spirometer at bedside: NO     GI:  Last Bowel Movement Date: 02/06/23  Current diet:  DIET ONE TIME MESSAGE  ADULT DIET Dysphagia - Pureed  Passing flatus: YES  Tolerating current diet: YES     Pain Management:   Patient states pain is manageable on current regimen: YES     Skin:  Mikey Score: 15  Interventions: increase time out of bed, PT/OT consult, and nutritional support     Patient Safety:  Fall Score:    Interventions: bed/chair alarm, assistive device (walker, cane, etc), gripper socks, and pt to call before getting OOB  @Rollbelt  @dexterity to release roll belt  Yes/No ( must document dexterity  here by stating Yes or No here, otherwise this is a restraint and must follow restraint documentation policy.)     DVT prophylaxis:  DVT prophylaxis Med- No  DVT prophylaxis SCD or JOSELO- No      Wounds: (If Applicable)  Wounds- No  Location      Active Consults:  IP CONSULT TO NEUROLOGY  IP CONSULT TO CARDIOLOGY     Length of Stay:  Expected LOS: 2d 21h  Actual LOS: 4  Discharge Plan: Yes Pending placement

## 2023-02-07 NOTE — PROGRESS NOTES
Case Management    CM faxed progress notes to [facility] for authorization to [facility] and [address] [place] [address]  Phone: [phone]

## 2023-02-07 NOTE — PROGRESS NOTES
Problem: Mobility Impaired (Adult and Pediatric)  Goal: *Acute Goals and Plan of Care (Insert Text)  Description:   FUNCTIONAL STATUS PRIOR TO ADMISSION: per chart, patient was independent prior to CVA which was 3 weeks ago. Patient and chart report that she has been bedbound since CVA. Patient with L hemiplegia. HOME SUPPORT PRIOR TO ADMISSION: patient at SNF since CVA. Physical Therapy Goals  Initiated 2/4/2023  1. Patient will move from supine to sit and sit to supine , scoot up and down, and roll side to side in bed with moderate assistance  within 7 day(s). 2.  Patient will transfer from bed to chair and chair to bed with maximal assistance using the least restrictive device within 7 day(s). 3.  Patient will perform sit to stand with maximal assistance within 7 day(s). 4.  Patient will demonstrate sitting EOB x 5 minutes without external support, requiring supervision in preparation for transfers within 7 days. Outcome: Not Progressing Towards Goal   PHYSICAL THERAPY TREATMENT  Patient: Ruba Castro (25 y.o. female)  Date: 2/7/2023  Diagnosis: Stroke-like episode [R29.90] <principal problem not specified>      Precautions: Bed Alarm, Contact  Chart, physical therapy assessment, plan of care and goals were reviewed. ASSESSMENT  Patient continues with skilled PT services and is not progressing towards goals. Patient received in bed and agreeable to participate. Placed in chair position with pillows to prop and treated with PROM to left UE and LE with manual facilitation, but limbs remain flaccid and unable to illicit any active motion. Requires total assist to roll and re-position in bed, with left LE and left UE propped on pillows. Patient is well below baseline with dense left hemiplegia and will require rehab in SNF.     Current Level of Function Impacting Discharge (mobility/balance): max to total assist    Other factors to consider for discharge: left side flaccid, high falls risk, well below baseline         PLAN :  Patient continues to benefit from skilled intervention to address the above impairments. Continue treatment per established plan of care. to address goals. Recommendation for discharge: (in order for the patient to meet his/her long term goals)  Therapy up to 5 days/week in SNF setting    This discharge recommendation:  Has been made in collaboration with the attending provider and/or case management    IF patient discharges home will need the following DME: to be determined (TBD)       SUBJECTIVE:   Patient stated the left knee is sore.     OBJECTIVE DATA SUMMARY:   Critical Behavior:  Neurologic State: Alert  Orientation Level: Oriented to person, Oriented to place  Cognition: Decreased command following  Safety/Judgement: Awareness of environment, Decreased insight into deficits, Fall prevention  Functional Mobility Training:  Bed Mobility:  Rolling: Maximum assistance        Scooting: Total assistance        Transfers:                                   Balance:     Ambulation/Gait Training:                                                        Stairs: Therapeutic Exercises:   PROM all planes for left LE and left UE  Pain Rating:      Activity Tolerance:   Fair    After treatment patient left in no apparent distress:   Supine in bed, Call bell within reach, Bed / chair alarm activated, and Side rails x 3    COMMUNICATION/COLLABORATION:   The patients plan of care was discussed with: Registered nurse.      Evangelist Morse, PT   Time Calculation: 14 mins

## 2023-02-07 NOTE — PROGRESS NOTES
Transition of Care Plan:     RUR: 13%  Disposition: [facility] as new resident was being explored - patient not being accepted due to current level of needs - in room assessment by Lexii LANDRY for [facility]  If SNF or IPR: Date FOC offered: 2/6/2023  Date FOC received: Son provided one choice - [facility] - does NOT want to return to [address]  Date authorization started with reference number:   Date authorization received and expires:  Accepting facility:  Follow up appointments: Pt does not have a PCP  DME needed: None  Transportation at Discharge: Pt will need medical transport at d/c.   Romina Mykel or means to access home:   Pt has access     IM Medicare Letter: 2nd IM Letter needed at d/c; 1st IM provided 2/5   Is patient a  and connected with the [address] [facility]? No              If yes, was [address] transfer form completed and [facility] notified? Caregiver Contact: Francesca Swain Son [phone]  Discharge Caregiver contacted prior to discharge? CM will notify caregiver at d/c. Care Conference needed?: No    Awaiting facility acceptance and will need [facility].     Sherry Suarez, RN, BSN, [address] [place] [address]  Manager of Case Management  [phone]

## 2023-02-08 LAB
GLUCOSE BLD STRIP.AUTO-MCNC: 102 MG/DL (ref 65–117)
GLUCOSE BLD STRIP.AUTO-MCNC: 107 MG/DL (ref 65–117)
GLUCOSE BLD STRIP.AUTO-MCNC: 131 MG/DL (ref 65–117)
SERVICE CMNT-IMP: ABNORMAL
SERVICE CMNT-IMP: NORMAL
SERVICE CMNT-IMP: NORMAL

## 2023-02-08 PROCEDURE — 74011250637 HC RX REV CODE- 250/637: Performed by: INTERNAL MEDICINE

## 2023-02-08 PROCEDURE — 74011250637 HC RX REV CODE- 250/637: Performed by: STUDENT IN AN ORGANIZED HEALTH CARE EDUCATION/TRAINING PROGRAM

## 2023-02-08 PROCEDURE — 99231 SBSQ HOSP IP/OBS SF/LOW 25: CPT | Performed by: PSYCHIATRY & NEUROLOGY

## 2023-02-08 PROCEDURE — 97530 THERAPEUTIC ACTIVITIES: CPT

## 2023-02-08 PROCEDURE — 65270000046 HC RM TELEMETRY

## 2023-02-08 PROCEDURE — 82962 GLUCOSE BLOOD TEST: CPT

## 2023-02-08 RX ADMIN — THERA TABS 1 TABLET: TAB at 09:13

## 2023-02-08 RX ADMIN — ASPIRIN 81 MG: 81 TABLET, CHEWABLE ORAL at 09:11

## 2023-02-08 RX ADMIN — TIMOLOL MALEATE 1 DROP: 5 SOLUTION OPHTHALMIC at 17:53

## 2023-02-08 RX ADMIN — AMLODIPINE BESYLATE 5 MG: 5 TABLET ORAL at 09:11

## 2023-02-08 RX ADMIN — TIMOLOL MALEATE 1 DROP: 5 SOLUTION OPHTHALMIC at 09:13

## 2023-02-08 RX ADMIN — AMIODARONE HYDROCHLORIDE 100 MG: 200 TABLET ORAL at 09:11

## 2023-02-08 RX ADMIN — GUAIFENESIN 600 MG: 600 TABLET, EXTENDED RELEASE ORAL at 20:30

## 2023-02-08 RX ADMIN — LACTULOSE 15 ML: 20 SOLUTION ORAL at 17:54

## 2023-02-08 RX ADMIN — LATANOPROST 1 DROP: 50 SOLUTION OPHTHALMIC at 23:19

## 2023-02-08 RX ADMIN — LACTULOSE 15 ML: 20 SOLUTION ORAL at 09:12

## 2023-02-08 RX ADMIN — LEVOTHYROXINE SODIUM 75 MCG: 0.07 TABLET ORAL at 09:11

## 2023-02-08 RX ADMIN — GUAIFENESIN 600 MG: 600 TABLET, EXTENDED RELEASE ORAL at 09:11

## 2023-02-08 RX ADMIN — ATORVASTATIN CALCIUM 20 MG: 20 TABLET, FILM COATED ORAL at 23:09

## 2023-02-08 RX ADMIN — ACETAMINOPHEN 325MG 650 MG: 325 TABLET ORAL at 20:29

## 2023-02-08 RX ADMIN — PANTOPRAZOLE SODIUM 40 MG: 40 TABLET, DELAYED RELEASE ORAL at 09:11

## 2023-02-08 NOTE — PROGRESS NOTES
ADDENDUM:  5:30pm CM contacted patient's son - Mr. Aung Vargas to inform him that [facility] has denied SNF placement despite review of the additional notes. Son aware that patient medically stable for discharge. He will update CM in AM of long term care plans and location for discharge. Additional OT and PT notes provided to Nadine/Kevin per their request.  Sent via FAX.       Paty Borjas RN, BSN, [address] [place] [address]  Manager of Case Management  [phone]

## 2023-02-08 NOTE — PROGRESS NOTES
Problem: Mobility Impaired (Adult and Pediatric)  Goal: *Acute Goals and Plan of Care (Insert Text)  Description:   FUNCTIONAL STATUS PRIOR TO ADMISSION: per chart, patient was independent prior to CVA which was 3 weeks ago. Patient and chart report that she has been bedbound since CVA. Patient with L hemiplegia. HOME SUPPORT PRIOR TO ADMISSION: patient at SNF since CVA. Physical Therapy Goals  Initiated 2/4/2023  1. Patient will move from supine to sit and sit to supine , scoot up and down, and roll side to side in bed with moderate assistance  within 7 day(s). 2.  Patient will transfer from bed to chair and chair to bed with maximal assistance using the least restrictive device within 7 day(s). 3.  Patient will perform sit to stand with maximal assistance within 7 day(s). 4.  Patient will demonstrate sitting EOB x 5 minutes without external support, requiring supervision in preparation for transfers within 7 days. Outcome: Progressing Towards Goal     PHYSICAL THERAPY TREATMENT  Patient: Austin Schultz (66 y.o. female)  Date: 2/8/2023  Diagnosis: Stroke-like episode [R29.90] <principal problem not specified>      Precautions: Bed Alarm, Contact, droplet  Chart, physical therapy assessment, plan of care and goals were reviewed. ASSESSMENT  Patient continues with skilled PT services and is slowly progressing towards goals. Patient with dense hemiplegia on L and with R gaze preference. No activation of L UE/LE noted during session. Overall total assist of 2 persons. Lean to R in supine and at edge of bed. Noted subluxation of L shoulder and edema L hand so repositioned once returned to bed with pillows for support. Progress with mobility anticipated to be slow and guarded given dense hemiplegia and age. Recommend SNF. Acute PT will continue to follow and progress as able.     Current Level of Function Impacting Discharge (mobility/balance): total assist x 2 bed mobility    Other factors to consider for discharge: admitted from SNF, L side flaccid         PLAN :  Patient continues to benefit from skilled intervention to address the above impairments. Continue treatment per established plan of care. to address goals. Recommendation for discharge: (in order for the patient to meet his/her long term goals)  Therapy up to 5 days/week in SNF setting    This discharge recommendation:  Has been made in collaboration with the attending provider and/or case management    IF patient discharges home will need the following DME: to be determined (TBD)     SUBJECTIVE:   Patient stated My knee hurts.     OBJECTIVE DATA SUMMARY:   Critical Behavior:  Neurologic State: Alert, Confused  Orientation Level: Oriented to person, Oriented to place, Disoriented to situation, Disoriented to time  Cognition: Follows commands, Decreased attention/concentration, Impaired decision making, Memory loss  Safety/Judgement: Decreased awareness of environment, Decreased awareness of need for assistance, Decreased awareness of need for safety, Decreased insight into deficits  Functional Mobility Training:  Bed Mobility:  Rolling: Maximum assistance (less assist for roll to L)  Supine to Sit: Assist x2;Total assistance  Sit to Supine: Assist x2;Total assistance  Scooting: Total assistance           Balance:  Sitting: Impaired; With support  Sitting - Static: Poor (constant support); Supported sitting  Sitting - Dynamic: Poor (constant support); Supported sitting  Standing:  (NT due to safety)        Therapeutic Exercises:   Sitting balance/tolerance  Reaching off base of support with R UE    Pain Rating:  L knee pain    Activity Tolerance:   Fair; dizzy at edge of bed, improved upon return to supine    After treatment patient left in no apparent distress:   Supine in bed, Call bell within reach, Bed / chair alarm activated, and Side rails x 3    COMMUNICATION/COLLABORATION:   The patients plan of care was discussed with: Occupational therapist and Registered nurse.      Jhonny Conner, PT   Time Calculation: 20 mins

## 2023-02-08 NOTE — PROGRESS NOTES
End of Shift Note     Bedside shift change report given to Christine Dudley (oncoming nurse) by Osmel Jaramillo (offgoing nurse). Report included the following information SBAR and Kardex     Shift worked: Nights   Shift summary and any significant changes:     None, pt was turn every 2 hours         Concerns for physician to address:  None   Zone phone for oncoming shift:   [phone]      Patient Information  Sandhya Castellanos  80 y.o.  2/3/2023  9:02 AM by Marilynn Rhodes MD. 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 ([facility] [address] 02/04/2023    Stroke-like episode 02/03/2023           Past Medical History:   Diagnosis Date    Atrial fibrillation ([address]      CVA (cerebral vascular accident) ([facility] [address]      GERD (gastroesophageal reflux disease)      Glaucoma      HTN (hypertension)      Hyperlipidemia           Core Measures:  CVA: Yes Yes  CHF:No No  PNA:No No     Activity:  Activity Level: Bed Rest  Number times ambulated in hallways past shift: 0  Number of times OOB to chair past shift: 0     Cardiac:   Cardiac Monitoring: No      Cardiac Rhythm: Sinus Adria Carlisle     Access:   Current line(s): PIV   Central Line? No Placement date  Reason Medically Necessary   PICC LINE? No Placement date Reason Medically Necessary      Genitourinary:   Urinary status: voiding  Urinary Catheter?  No Placement Date  Reason Medically Necessary      Respiratory:   O2 Device: None (Room air)  Chronic home O2 use?: NO  Incentive spirometer at bedside: NO     GI:  Last Bowel Movement Date: 02/06/23  Current diet:  DIET ONE TIME MESSAGE  ADULT DIET Dysphagia - Pureed  Passing flatus: YES  Tolerating current diet: YES     Pain Management:   Patient states pain is manageable on current regimen: YES     Skin:  Mikey Score: 15  Interventions: increase time out of bed, PT/OT consult, and nutritional support     Patient Safety:  Fall Score:    Interventions: bed/chair alarm, assistive device (walker, cane, etc), gripper socks, and pt to call before getting OOB  @Rollbelt  @dexterity to release roll belt  Yes/No ( must document dexterity  here by stating Yes or No here, otherwise this is a restraint and must follow restraint documentation policy.)     DVT prophylaxis:  DVT prophylaxis Med- No  DVT prophylaxis SCD or JOSELO- No      Wounds: (If Applicable)  Wounds- No  Location      Active Consults:  IP CONSULT TO NEUROLOGY  IP CONSULT TO CARDIOLOGY     Length of Stay:  Expected LOS: 2d 21h  Actual LOS: 4  Discharge Plan: Yes Pending placement

## 2023-02-08 NOTE — PROGRESS NOTES
Problem: TIA/CVA Stroke: 0-24 hours  Goal: Off Pathway (Use only if patient is Off Pathway)  Outcome: Progressing Towards Goal     Problem: TIA/CVA Stroke: 0-24 hours  Goal: Off Pathway (Use only if patient is Off Pathway)  Outcome: Progressing Towards Goal  Goal: Activity/Safety  Outcome: Progressing Towards Goal  Goal: Consults, if ordered  Outcome: Progressing Towards Goal  Goal: Diagnostic Test/Procedures  Outcome: Progressing Towards Goal  Goal: Nutrition/Diet  Outcome: Progressing Towards Goal  Goal: Discharge Planning  Outcome: Progressing Towards Goal  Goal: Medications  Outcome: Progressing Towards Goal  Goal: Respiratory  Outcome: Progressing Towards Goal  Goal: Treatments/Interventions/Procedures  Outcome: Progressing Towards Goal  Goal: Minimize risk of bleeding post-thrombolytic infusion  Outcome: Progressing Towards Goal  Goal: Monitor for complications post-thrombolytic infusion  Outcome: Progressing Towards Goal  Goal: Psychosocial  Outcome: Progressing Towards Goal  Goal: *Hemodynamically stable  Outcome: Progressing Towards Goal  Goal: *Neurologically stable  Description: Absence of additional neurological deficits    Outcome: Progressing Towards Goal  Goal: *Verbalizes anxiety and depression are reduced or absent  Outcome: Progressing Towards Goal  Goal: *Absence of Signs of Aspiration on Current Diet  Outcome: Progressing Towards Goal  Goal: *Absence of deep venous thrombosis signs and symptoms(Stroke Metric)  Outcome: Progressing Towards Goal  Goal: *Ability to perform ADLs and demonstrates progressive mobility and function  Outcome: Progressing Towards Goal  Goal: *Stroke education started(Stroke Metric)  Outcome: Progressing Towards Goal  Goal: *Dysphagia screen performed(Stroke Metric)  Outcome: Progressing Towards Goal  Goal: *Rehab consulted(Stroke Metric)  Outcome: Progressing Towards Goal

## 2023-02-08 NOTE — PROGRESS NOTES
Problem: Falls - Risk of  Goal: *Absence of Falls  Description: Document Hannah Cortes Fall Risk and appropriate interventions in the flowsheet.   Note: Fall Risk Interventions:            Medication Interventions: Bed/chair exit alarm, Patient to call before getting OOB    Elimination Interventions: Call light in reach, Bed/chair exit alarm

## 2023-02-08 NOTE — PROGRESS NOTES
Problem: Self Care Deficits Care Plan (Adult)  Goal: *Acute Goals and Plan of Care (Insert Text)  Description: FUNCTIONAL STATUS PRIOR TO ADMISSION: Per chart review, patient was independent until 3 weeks ago following CVA. Since CVA, she has been bed bound at SNF with L hemiplegia and inattention. HOME SUPPORT: Patient from SNF. Occupational Therapy Goals  Initiated 2/4/2023  1. Patient will perform supine > sit EOB to prepare for OOB ADLs with moderate assistance within 7 day(s). 2.  Patient will perform simple grooming task with supervision/set-up within 7 day(s). 3.  Patient will perform upper body dressing using sally technique with moderate assistance within 7 day(s). 4.  Patient will perform toilet transfers to [facility] with maximal assistance within 7 day(s). 5.  Patient will perform all aspects of toileting with maximal assistance within 7 day(s). 6.  Patient will participate in upper extremity therapeutic exercise/activities with minimal assistance for 3 minutes within 7 day(s). Outcome: Not Progressing Towards Goal     OCCUPATIONAL THERAPY TREATMENT  Patient: Jessi Gentile (33 y.o. female)  Date: 2/8/2023  Diagnosis: Stroke-like episode [R29.90] <principal problem not specified>      Precautions: Bed Alarm, Contact  Chart, occupational therapy assessment, plan of care, and goals were reviewed. ASSESSMENT  Patient continues with skilled OT services and is not progressing towards goals. Pt received in bed, alert, oriented x2, confusion noted. Pt c/o L knee pain at rest and with movement. Pt requires min-totalA for all ADLs, maxAx2 for rolling, and totalAx2 for supine<>sit transfer.  Pt noted with poor sitting balance with slight pushing to L with RUE in weight-bearing, flaccid LUE/LLE, posterior lean/LOB, left visual inattention with right visual gaze preference (able to track to midline with heavy cues), LUE hand edema, LUE shoulder subluxation of 1 finger width, and impaired cognition (attention, orientation, insight, problem solving, command following, awareness). Pt tolerated EOB static sitting for >5 minutes requiring up to maxA, able to maintain static sitting for a few seconds with Jordon. Pt refused to eat meal tray at bedside. Pt returned to supine with LUE elevated with 2 pillows to reduce distal edema and reduce further risk of shoulder subluxation; pillow to right of torso to maintain midline alignment. Continue to recommend return to SNF level rehab at discharge; pt cannot tolerate 3 hours of therapy at this time. Current Level of Function Impacting Discharge (ADLs): min-totalA; totalAx2 supine<>sit    Other factors to consider for discharge: dense L hemiplegia, cognition, fall risk, palliative consulted         PLAN :  Patient continues to benefit from skilled intervention to address the above impairments. Continue treatment per established plan of care to address goals. Recommend with staff: elevated LUE with 2 pillows while in bed, encourage ADLs at bed level (self-feeding/grooming with RUE)    Recommend next OT session: bed level ADLs for grooming/self-feeding with RUE, command following with rote tasks    Recommendation for discharge: (in order for the patient to meet his/her long term goals)  Therapy up to 5 days/week in SNF setting    This discharge recommendation:  Has not yet been discussed the attending provider and/or case management    IF patient discharges home will need the following DME: TBD at rehab       SUBJECTIVE:   Patient stated My knee hurts!     OBJECTIVE DATA SUMMARY:   Cognitive/Behavioral Status:  Neurologic State: Alert;Confused  Orientation Level: Oriented to person;Oriented to place; Disoriented to situation;Disoriented to time  Cognition: Follows commands;Decreased attention/concentration; Impaired decision making;Memory loss  Perception: Cues to maintain midline in sitting;Cues to attend to left side of body;Cues to attend left visual field; Tactile;Verbal;Visual  Perseveration: No perseveration noted  Safety/Judgement: Decreased awareness of environment;Decreased awareness of need for assistance;Decreased awareness of need for safety;Decreased insight into deficits    Functional Mobility and Transfers for ADLs:  Bed Mobility:  Rolling: Maximum assistance (less assist for roll to L  Simultaneous filing. User may not have seen previous data.)  Supine to Sit: Assist x2;Total assistance (Simultaneous filing. User may not have seen previous data.)  Sit to Supine: Assist x2;Total assistance (Simultaneous filing. User may not have seen previous data.)  Scooting: Total assistance    Balance:  Sitting: Impaired; With support  Sitting - Static: Poor (constant support); Supported sitting  Sitting - Dynamic: Poor (constant support); Supported sitting  Standing:  (NT due to safety)    ADL Intervention:         Cognitive Retraining  Safety/Judgement: Decreased awareness of environment;Decreased awareness of need for assistance;Decreased awareness of need for safety;Decreased insight into deficits      Pain:  C/o L knee pain, unable to give numerical value    Activity Tolerance:   Fair    After treatment patient left in no apparent distress:   Supine in bed, Call bell within reach, Bed / chair alarm activated, and Side rails x 3    COMMUNICATION/COLLABORATION:   The patients plan of care was discussed with: Physical therapist and Registered nurse.      Cheri Ding OT  Time Calculation: 21 mins

## 2023-02-08 NOTE — PROGRESS NOTES
Peer to Peer completed by MD Goyo Shrestha needs updated OT/PT notes before 4PM to make final determination. Nursing contacting therapy to get new assessment.     Crystal Orlando, RN, BSN, [address] [place] [address]  Manager of Case Management  [phone]

## 2023-02-08 NOTE — PROGRESS NOTES
Problem: Patient Education: Go to Patient Education Activity  Goal: Patient/Family Education  Outcome: Progressing Towards Goal     Problem: TIA/CVA Stroke: 0-24 hours  Goal: Off Pathway (Use only if patient is Off Pathway)  Outcome: Progressing Towards Goal  Goal: Activity/Safety  Outcome: Progressing Towards Goal  Goal: Consults, if ordered  Outcome: Progressing Towards Goal  Goal: Diagnostic Test/Procedures  Outcome: Progressing Towards Goal  Goal: Nutrition/Diet  Outcome: Progressing Towards Goal  Goal: Discharge Planning  Outcome: Progressing Towards Goal  Goal: Medications  Outcome: Progressing Towards Goal  Goal: Respiratory  Outcome: Progressing Towards Goal  Goal: Treatments/Interventions/Procedures  Outcome: Progressing Towards Goal  Goal: Minimize risk of bleeding post-thrombolytic infusion  Outcome: Progressing Towards Goal  Goal: Monitor for complications post-thrombolytic infusion  Outcome: Progressing Towards Goal  Goal: Psychosocial  Outcome: Progressing Towards Goal  Goal: *Hemodynamically stable  Outcome: Progressing Towards Goal  Goal: *Neurologically stable  Description: Absence of additional neurological deficits    Outcome: Progressing Towards Goal  Goal: *Verbalizes anxiety and depression are reduced or absent  Outcome: Progressing Towards Goal  Goal: *Absence of Signs of Aspiration on Current Diet  Outcome: Progressing Towards Goal  Goal: *Absence of deep venous thrombosis signs and symptoms(Stroke Metric)  Outcome: Progressing Towards Goal  Goal: *Ability to perform ADLs and demonstrates progressive mobility and function  Outcome: Progressing Towards Goal  Goal: *Stroke education started(Stroke Metric)  Outcome: Progressing Towards Goal  Goal: *Dysphagia screen performed(Stroke Metric)  Outcome: Progressing Towards Goal  Goal: *Rehab consulted(Stroke Metric)  Outcome: Progressing Towards Goal     Problem: TIA/CVA Stroke: Day 2 Until Discharge  Goal: Off Pathway (Use only if patient is Off Pathway)  Outcome: Progressing Towards Goal  Goal: Activity/Safety  Outcome: Progressing Towards Goal  Goal: Diagnostic Test/Procedures  Outcome: Progressing Towards Goal  Goal: Nutrition/Diet  Outcome: Progressing Towards Goal  Goal: Discharge Planning  Outcome: Progressing Towards Goal  Goal: Medications  Outcome: Progressing Towards Goal  Goal: Respiratory  Outcome: Progressing Towards Goal  Goal: Treatments/Interventions/Procedures  Outcome: Progressing Towards Goal  Goal: Psychosocial  Outcome: Progressing Towards Goal  Goal: *Verbalizes anxiety and depression are reduced or absent  Outcome: Progressing Towards Goal  Goal: *Absence of aspiration  Outcome: Progressing Towards Goal  Goal: *Absence of deep venous thrombosis signs and symptoms(Stroke Metric)  Outcome: Progressing Towards Goal  Goal: *Optimal pain control at patient's stated goal  Outcome: Progressing Towards Goal  Goal: *Tolerating diet  Outcome: Progressing Towards Goal  Goal: *Ability to perform ADLs and demonstrates progressive mobility and function  Outcome: Progressing Towards Goal  Goal: *Stroke education continued(Stroke Metric)  Outcome: Progressing Towards Goal     Problem: Ischemic Stroke: Discharge Outcomes  Goal: *Verbalizes anxiety and depression are reduced or absent  Outcome: Progressing Towards Goal  Goal: *Verbalize understanding of risk factor modification(Stroke Metric)  Outcome: Progressing Towards Goal  Goal: *Hemodynamically stable  Outcome: Progressing Towards Goal  Goal: *Absence of aspiration pneumonia  Outcome: Progressing Towards Goal  Goal: *Aware of needed dietary changes  Outcome: Progressing Towards Goal  Goal: *Verbalize understanding of prescribed medications including anti-coagulants, anti-lipid, and/or anti-platelets(Stroke Metric)  Outcome: Progressing Towards Goal  Goal: *Tolerating diet  Outcome: Progressing Towards Goal  Goal: *Aware of follow-up diagnostics related to anticoagulants  Outcome: Progressing Towards Goal  Goal: *Ability to perform ADLs and demonstrates progressive mobility and function  Outcome: Progressing Towards Goal  Goal: *Absence of DVT(Stroke Metric)  Outcome: Progressing Towards Goal  Goal: *Absence of aspiration  Outcome: Progressing Towards Goal  Goal: *Optimal pain control at patient's stated goal  Outcome: Progressing Towards Goal  Goal: *Home safety concerns addressed  Outcome: Progressing Towards Goal  Goal: *Describes available resources and support systems  Outcome: Progressing Towards Goal  Goal: *Verbalizes understanding of activation of EMS(911) for stroke symptoms(Stroke Metric)  Outcome: Progressing Towards Goal  Goal: *Understands and describes signs and symptoms to report to providers(Stroke Metric)  Outcome: Progressing Towards Goal  Goal: *Neurolgocially stable (absence of additional neurological deficits)  Outcome: Progressing Towards Goal  Goal: *Verbalizes importance of follow-up with primary care physician(Stroke Metric)  Outcome: Progressing Towards Goal  Goal: *Smoking cessation discussed,if applicable(Stroke Metric)  Outcome: Progressing Towards Goal  Goal: *Depression screening completed(Stroke Metric)  Outcome: Progressing Towards Goal     Problem: Pain  Goal: *Control of Pain  Outcome: Progressing Towards Goal  Goal: *PALLIATIVE CARE:  Alleviation of Pain  Outcome: Progressing Towards Goal     Problem: Patient Education: Go to Patient Education Activity  Goal: Patient/Family Education  Outcome: Progressing Towards Goal     Problem: Falls - Risk of  Goal: *Absence of Falls  Description: Document Aylin Fall Risk and appropriate interventions in the flowsheet.   Outcome: Progressing Towards Goal  Note: Fall Risk Interventions:            Medication Interventions: Bed/chair exit alarm, Patient to call before getting OOB    Elimination Interventions: Call light in reach, Bed/chair exit alarm              Problem: Patient Education: Go to Patient Education Activity  Goal: Patient/Family Education  Outcome: Progressing Towards Goal     Problem: Risk for Spread of Infection  Goal: Prevent transmission of infectious organism to others  Description: Prevent the transmission of infectious organisms to other patients, staff members, and visitors. Outcome: Progressing Towards Goal     Problem: Patient Education:  Go to Education Activity  Goal: Patient/Family Education  Outcome: Progressing Towards Goal     Problem: Patient Education: Go to Patient Education Activity  Goal: Patient/Family Education  Outcome: Progressing Towards Goal     Problem: Patient Education: Go to Patient Education Activity  Goal: Patient/Family Education  Outcome: Progressing Towards Goal     Problem: Pressure Injury - Risk of  Goal: *Prevention of pressure injury  Description: Document Mikey Scale and appropriate interventions in the flowsheet.   Outcome: Progressing Towards Goal  Note: Pressure Injury Interventions:  Sensory Interventions: Assess changes in LOC    Moisture Interventions: Apply protective barrier, creams and emollients, Check for incontinence Q2 hours and as needed    Activity Interventions: Increase time out of bed    Mobility Interventions: Float heels, HOB 30 degrees or less    Nutrition Interventions: Document food/fluid/supplement intake    Friction and Shear Interventions: Apply protective barrier, creams and emollients, HOB 30 degrees or less                Problem: Patient Education: Go to Patient Education Activity  Goal: Patient/Family Education  Outcome: Progressing Towards Goal     Problem: Patient Education: Go to Patient Education Activity  Goal: Patient/Family Education  Outcome: Progressing Towards Goal     Problem: Patient Education: Go to Patient Education Activity  Goal: Patient/Family Education  Outcome: Progressing Towards Goal

## 2023-02-08 NOTE — PROGRESS NOTES
Hospitalist Progress Note    Subjective:   Daily Progress Note: 2/8/2023 2:24 PM    Hospital Course:  Martha Tello is a 80 y.o.  female who presents with PMH of HTN, afib, CVA, DM, glaucoma, hypothyroidism, and GERD. Patient presenting from [facility] after episode of AMS this morning. Reportedly she fell from bed level yesterday and did not hit her head. Yesterday post fall she was fine at new baseline of bed bound with some dysarthria, however able to talk. This morning staff noted that she was altered by finding that she only responded to pain. Per son when he presented to ED, patient was back to her new baseline post CVA. Prior to stroke 3 weeks ago, she was living independently without a assisted walking device. She was driving and managing her ADLs independently with some short term memory difficulties. Since 1/2023 CVA she has been in rehab with left sided hemiplegia and left sided facial droop. MRI brain shows subacute hemorrhage associated with subacute infarct in right corona radiata/basal ganglia. Subjective:  Patient had urinary retention, straight cath done. Patient denies any new complaints. Assessment / Plan:  Acute encephalopathy:  Hemorrhagic transformation of recent stroke    MRI brain shows subacute hemorrhage associated with subacute infarct in right corona radiata/basal ganglia. Repeat CT head 2/5 stable hemorrhage  Repeat CT head in 1-2 week as outpatient. Neurology recommends to be off for xarelto for 4-6 weeks. Has elevated ammonia level- started on lactulose      Cough:  Rhino and enterovirus +  Mucinex    Hypertension  Afib:  Hold xarelto in the setting of subacute hemorrhage  Hold  losartan, metoprolol  Resume amlodipine  Started on amiodarone at lower dose 100 mg po daily due to bradycardia  Appreciate cardiology consult  Outpatient cardiology follow up for watchman evaluation.      Diabetes mellitus:  Continue sliding scale    Glaucoma:  Continue eye drops        18.5 - 24.9 Normal weight / Body mass index is 20.34 kg/m². Code status: Full  Prophylaxis: SCD's  Recommended Disposition: SNF/LTC  Awaiting placement.   Consult palliative team for goals of care       Current Facility-Administered Medications   Medication Dose Route Frequency    atorvastatin (LIPITOR) tablet 20 mg  20 mg Oral QHS    amiodarone (CORDARONE) tablet 100 mg  100 mg Oral DAILY    lactulose (CHRONULAC) 10 gram/15 mL solution 15 mL  10 g Oral TID    melatonin tablet 3 mg  3 mg Oral QHS PRN    sodium chloride (NS) flush 5-10 mL  5-10 mL IntraVENous PRN    aspirin chewable tablet 81 mg  81 mg Oral DAILY    latanoprost (XALATAN) 0.005 % ophthalmic solution 1 Drop  1 Drop Both Eyes QHS    levothyroxine (SYNTHROID) tablet 75 mcg  75 mcg Oral ACB    [Held by provider] losartan (COZAAR) tablet 100 mg  100 mg Oral DAILY    amLODIPine (NORVASC) tablet 5 mg  5 mg Oral DAILY    timolol (TIMOPTIC) 0.5 % ophthalmic solution 1 Drop  1 Drop Both Eyes BID    pantoprazole (PROTONIX) tablet 40 mg  40 mg Oral ACB    [Held by provider] rivaroxaban (XARELTO) tablet 15 mg  15 mg Oral DAILY    therapeutic multivitamin (THERAGRAN) tablet 1 Tablet  1 Tablet Oral DAILY    acetaminophen (TYLENOL) tablet 650 mg  650 mg Oral Q4H PRN    Or    acetaminophen (TYLENOL) solution 650 mg  650 mg Per NG tube Q4H PRN    Or    acetaminophen (TYLENOL) suppository 650 mg  650 mg Rectal Q4H PRN    magnesium hydroxide (MILK OF MAGNESIA) 400 mg/5 mL oral suspension 30 mL  30 mL Oral DAILY PRN    guaiFENesin ER (MUCINEX) tablet 600 mg  600 mg Oral Q12H    benzonatate (TESSALON) capsule 100 mg  100 mg Oral TID PRN    acetaminophen (TYLENOL) tablet 500 mg  500 mg Oral Q6H PRN    insulin lispro (HUMALOG) injection   SubCUTAneous AC&HS    glucose chewable tablet 16 g  4 Tablet Oral PRN    glucagon (GLUCAGEN) injection 1 mg  1 mg IntraMUSCular PRN    dextrose 10% infusion 0-250 mL  0-250 mL IntraVENous PRN        Review of Systems  Constitutional: No fevers, No chills, No sweats, No fatigue, No Weakness  Eyes: No redness  Ears, nose, mouth, throat, and face: No nasal congestion, No sore throat, No voice change  Respiratory: No Shortness of Breath, No cough, No wheezing  Cardiovascular: No chest pain, No palpitations, No extremity edema  Gastrointestinal: No nausea, No vomiting, No diarrhea, No abdominal pain  Genitourinary: No frequency, No dysuria, No hematuria  Integument/breast: No skin lesion(s)   Neurological: has mild Confusion, No headaches, No dizziness      Objective:     Visit Vitals  BP (!) 131/59   Pulse (!) 50   Temp 97.7 °F (36.5 °C)   Resp 17   Ht 5' 8\" (1.727 m)   Wt 60.7 kg (133 lb 12.8 oz)   SpO2 100%   BMI 20.34 kg/m²      O2 Device: None (Room air)    Temp (24hrs), Av °F (36.7 °C), Min:97.5 °F (36.4 °C), Max:98.8 °F (37.1 °C)      No intake/output data recorded.  1901 -  0700  In: 300 [P.O.:300]  Out: 1     PHYSICAL EXAM:  Constitutional: No acute distress  Skin: Extremities and face reveal no rashes. HEENT: Sclerae anicteric. Extra-occular muscles are intact. No oral ulcers. The neck is supple and no masses. Cardiovascular: Regular rate and rhythm. Respiratory:  Clear breath sounds bilaterally with no wheezes, rales, or rhonchi. GI: Abdomen nondistended, soft, and nontender. Normal active bowel sounds. Musculoskeletal: No pitting edema of the lower legs. Able to move all ext  Neurological:  Patient was sleepy this morning.    Psychiatric: Mood appears appropriate       Data Review    Recent Results (from the past 24 hour(s))   GLUCOSE, POC    Collection Time: 23 11:31 AM   Result Value Ref Range    Glucose (POC) 132 (H) 65 - 117 mg/dL    Performed by Shirly Osgood PCT    GLUCOSE, POC    Collection Time: 23  4:30 PM   Result Value Ref Range    Glucose (POC) 103 65 - 117 mg/dL    Performed by Karine Wallace    GLUCOSE, POC    Collection Time: 23  9:03 PM   Result Value Ref Range    Glucose (POC) 111 65 - 117 mg/dL    Performed by Kate Bundy RN    GLUCOSE, POC    Collection Time: 02/08/23  7:38 AM   Result Value Ref Range    Glucose (POC) 102 65 - 117 mg/dL    Performed by [facility], POC    Collection Time: 02/08/23 11:13 AM   Result Value Ref Range    Glucose (POC) 107 65 - 117 mg/dL    Performed by Peterson Payan (TRV RN)                  Time spent 35 minutes involving direct patient care as well as reviewing patient's labs and coordination of care with nursing staff     Care Plan discussed with: Patient/Family/RN/Case Management        Total time spent with patient: 35 minutes.

## 2023-02-08 NOTE — PROGRESS NOTES
Transition of Care Plan:     RUR: 13%  Disposition: patient denied assisted living - son renetta Grove started for SNF - provided choices of [facility] and [facility]  Date authorization started with reference number: 2/7/2023  Date authorization received and expires: P2P requested 2/8 - MD informed   Accepting facility: [facility]  Follow up appointments: Pt does not have a PCP  DME needed: None  Transportation at Discharge: Pt will need medical transport at d/c.   [facility] or means to access home:   Pt has access     IM Medicare Letter: 2nd IM Letter needed at d/c; 1st IM provided 2/5   Is patient a  and connected with the [place]? No              If yes, was [employer] transfer form completed and VA notified? Caregiver Contact: Aris Carrel- Son [phone]  Discharge Caregiver contacted prior to discharge? CM will notify caregiver at d/c. Care Conference needed?: No    Peer to Peer requested by [facility] [employer] [facility] for SNF - MD notified to call [phone], opt 5 for ref # [id number] by deadline today 2/8 at [address] [facility].    Son has requested referral be sent to [address].  informed him that if [facility] has denied SNF, they will definitely deny Acute Inpatient Rehab. He would like referral sent per his request to see if [facility] would accept.  has also provided resources for private duty caregivers and assistance with long term care placement agencies.     Dahiana Mojica RN, BSN, [address] [place] [address]  Manager of Case Management  [phone]

## 2023-02-09 LAB
BACTERIA SPEC CULT: NORMAL
BACTERIA SPEC CULT: NORMAL
GLUCOSE BLD STRIP.AUTO-MCNC: 105 MG/DL (ref 65–117)
GLUCOSE BLD STRIP.AUTO-MCNC: 113 MG/DL (ref 65–117)
GLUCOSE BLD STRIP.AUTO-MCNC: 137 MG/DL (ref 65–117)
GLUCOSE BLD STRIP.AUTO-MCNC: 151 MG/DL (ref 65–117)
GLUCOSE BLD STRIP.AUTO-MCNC: 159 MG/DL (ref 65–117)
GLUCOSE BLD STRIP.AUTO-MCNC: 86 MG/DL (ref 65–117)
SERVICE CMNT-IMP: ABNORMAL
SERVICE CMNT-IMP: NORMAL

## 2023-02-09 PROCEDURE — 74011250637 HC RX REV CODE- 250/637: Performed by: INTERNAL MEDICINE

## 2023-02-09 PROCEDURE — 82962 GLUCOSE BLOOD TEST: CPT

## 2023-02-09 PROCEDURE — 65270000046 HC RM TELEMETRY

## 2023-02-09 PROCEDURE — 74011250637 HC RX REV CODE- 250/637: Performed by: STUDENT IN AN ORGANIZED HEALTH CARE EDUCATION/TRAINING PROGRAM

## 2023-02-09 PROCEDURE — 92526 ORAL FUNCTION THERAPY: CPT

## 2023-02-09 PROCEDURE — 92507 TX SP LANG VOICE COMM INDIV: CPT

## 2023-02-09 RX ADMIN — ACETAMINOPHEN 325MG 650 MG: 325 TABLET ORAL at 09:55

## 2023-02-09 RX ADMIN — AMLODIPINE BESYLATE 5 MG: 5 TABLET ORAL at 09:54

## 2023-02-09 RX ADMIN — LACTULOSE 15 ML: 20 SOLUTION ORAL at 09:55

## 2023-02-09 RX ADMIN — TIMOLOL MALEATE 1 DROP: 5 SOLUTION OPHTHALMIC at 09:56

## 2023-02-09 RX ADMIN — GUAIFENESIN 600 MG: 600 TABLET, EXTENDED RELEASE ORAL at 09:55

## 2023-02-09 RX ADMIN — ACETAMINOPHEN 325MG 650 MG: 325 TABLET ORAL at 21:36

## 2023-02-09 RX ADMIN — LATANOPROST 1 DROP: 50 SOLUTION OPHTHALMIC at 21:40

## 2023-02-09 RX ADMIN — PANTOPRAZOLE SODIUM 40 MG: 40 TABLET, DELAYED RELEASE ORAL at 09:55

## 2023-02-09 RX ADMIN — AMIODARONE HYDROCHLORIDE 100 MG: 200 TABLET ORAL at 09:54

## 2023-02-09 RX ADMIN — GUAIFENESIN 600 MG: 600 TABLET, EXTENDED RELEASE ORAL at 21:36

## 2023-02-09 RX ADMIN — LEVOTHYROXINE SODIUM 75 MCG: 0.07 TABLET ORAL at 09:54

## 2023-02-09 RX ADMIN — ASPIRIN 81 MG: 81 TABLET, CHEWABLE ORAL at 09:54

## 2023-02-09 RX ADMIN — ATORVASTATIN CALCIUM 20 MG: 20 TABLET, FILM COATED ORAL at 21:37

## 2023-02-09 RX ADMIN — THERA TABS 1 TABLET: TAB at 09:55

## 2023-02-09 RX ADMIN — TIMOLOL MALEATE 1 DROP: 5 SOLUTION OPHTHALMIC at 17:33

## 2023-02-09 NOTE — PROGRESS NOTES
Transition of Care Plan:     RUR: 13%  Disposition: [employer] denied SNF placement; LTC pvt pay  Follow up appointments: Pt does not have a PCP  DME needed: None  Transportation at Discharge: Pt will need medical transport at d/c.   [address] or means to access home:   Pt has access     IM Medicare Letter: 2nd IM Letter needed at d/c; 1st IM provided 2/5   Is patient a [place] and connected with the [place]? No              If yes, was [employer] transfer form completed and VA notified? Caregiver Contact: Kaley Davidson- Son [phone]  Discharge Caregiver contacted prior to discharge? CM will notify caregiver at d/c. Care Conference needed?: No    Pt's son provided choices for LTC which are [address] [facility] [facility] [place] [address]&[place], [place] and [place] [address]. CM will send referrals. CM reviewed previous CM's notes. CM attempted to call pt's son to discuss; however, his voice mail is full. CM will continue to follow.     Serafin Fabian LMSW  Supervisee in [facility]

## 2023-02-09 NOTE — PROGRESS NOTES
Neurology Progress Note    Patient ID:  Enedina Oquendo  [id number]  80 y.o.  6/14/1932      CHIEF COMPLAINT: Increased left-sided weakness    Subjective:      Patient has complaints of increased left-sided weakness that occurred after she was just discharged from [facility] less than a week ago with acute stroke in the right basal ganglia area that now on MRI scan shows hemorrhagic transformation which is causing her increased weakness on the left side. She was on Xarelto and has been discontinued, and she is now on just a baby aspirin. She has a history of GI bleed in the past, we have reassured her that she could take a coated baby aspirin safely since has been 1 year since her stomach problem. She has a fairly flaccid or dense left hemiplegia now. She does not complain of any headache and we discussed her condition with the patient and her son in detail. We will repeat a CT tomorrow in the morning and she might be able to go back to her rehab center to repeat another CT scan in another week or 2 if tomorrow's are stable. Patient's CT scan was stable today, no increase in the hemorrhage, and she remains the same on exam with fairly dense left hemiplegia and she is almost flaccid on the left, but not complaining of any headache or other new neurological symptoms. Cardiology feels weak and holds her anticoagulation for 4 weeks, and we should see her back in clinic first and get a CT scan before we restart anticoagulation to make sure there is complete resolution of the hemorrhage.   Otherwise she is probably close to discharge back to rehab    Current Facility-Administered Medications   Medication Dose Route Frequency    lactulose (CHRONULAC) 10 gram/15 mL solution 15 mL  10 g Oral BID    atorvastatin (LIPITOR) tablet 20 mg  20 mg Oral QHS    amiodarone (CORDARONE) tablet 100 mg  100 mg Oral DAILY    melatonin tablet 3 mg  3 mg Oral QHS PRN    sodium chloride (NS) flush 5-10 mL  5-10 mL IntraVENous PRN    aspirin chewable tablet 81 mg  81 mg Oral DAILY    latanoprost (XALATAN) 0.005 % ophthalmic solution 1 Drop  1 Drop Both Eyes QHS    levothyroxine (SYNTHROID) tablet 75 mcg  75 mcg Oral ACB    [Held by provider] losartan (COZAAR) tablet 100 mg  100 mg Oral DAILY    amLODIPine (NORVASC) tablet 5 mg  5 mg Oral DAILY    timolol (TIMOPTIC) 0.5 % ophthalmic solution 1 Drop  1 Drop Both Eyes BID    pantoprazole (PROTONIX) tablet 40 mg  40 mg Oral ACB    [Held by provider] rivaroxaban (XARELTO) tablet 15 mg  15 mg Oral DAILY    therapeutic multivitamin (THERAGRAN) tablet 1 Tablet  1 Tablet Oral DAILY    acetaminophen (TYLENOL) tablet 650 mg  650 mg Oral Q4H PRN    Or    acetaminophen (TYLENOL) solution 650 mg  650 mg Per NG tube Q4H PRN    Or    acetaminophen (TYLENOL) suppository 650 mg  650 mg Rectal Q4H PRN    magnesium hydroxide (MILK OF MAGNESIA) 400 mg/5 mL oral suspension 30 mL  30 mL Oral DAILY PRN    guaiFENesin ER (MUCINEX) tablet 600 mg  600 mg Oral Q12H    benzonatate (TESSALON) capsule 100 mg  100 mg Oral TID PRN    acetaminophen (TYLENOL) tablet 500 mg  500 mg Oral Q6H PRN    insulin lispro (HUMALOG) injection   SubCUTAneous AC&HS    glucose chewable tablet 16 g  4 Tablet Oral PRN    glucagon (GLUCAGEN) injection 1 mg  1 mg IntraMUSCular PRN    dextrose 10% infusion 0-250 mL  0-250 mL IntraVENous PRN        Past Medical History:   Diagnosis Date    Atrial fibrillation (HCC)     CVA (cerebral vascular accident) ([facility] [address]     GERD (gastroesophageal reflux disease)     Glaucoma     HTN (hypertension)     Hyperlipidemia        No past surgical history on file.     @[facility]    Social History     Tobacco Use    Smoking status: Never    Smokeless tobacco: Never   Substance Use Topics    Alcohol use: Not Currently       Current Facility-Administered Medications   Medication Dose Route Frequency Provider Last Rate Last Admin    lactulose (CHRONULAC) 10 gram/15 mL solution 15 mL  10 g Oral BID Steven Boyd MD   15 mL at 02/08/23 1754    atorvastatin (LIPITOR) tablet 20 mg  20 mg Oral QHS Pearl Canavan, MD   20 mg at 02/07/23 2128    amiodarone (CORDARONE) tablet 100 mg  100 mg Oral DAILY Francisco Bolivar MD   100 mg at 02/08/23 0911    melatonin tablet 3 mg  3 mg Oral QHS PRN Josh Mcallister PA-C        sodium chloride (NS) flush 5-10 mL  5-10 mL IntraVENous PRN Ollie Dunham MD   10 mL at 02/06/23 2155    aspirin chewable tablet 81 mg  81 mg Oral DAILY Ollie Dunham MD   81 mg at 02/08/23 0911    latanoprost (XALATAN) 0.005 % ophthalmic solution 1 Drop  1 Drop Both Eyes QHS Ollie Dunham MD   1 Drop at 02/07/23 2139    levothyroxine (SYNTHROID) tablet 75 mcg  75 mcg Oral ACB Ollie Dunham MD   75 mcg at 02/08/23 0911    [Held by provider] losartan (COZAAR) tablet 100 mg  100 mg Oral DAILY Ollie Dunham MD        amLODIPine (NORVASC) tablet 5 mg  5 mg Oral DAILY Ollie Dunham MD   5 mg at 02/08/23 0911    timolol (TIMOPTIC) 0.5 % ophthalmic solution 1 Drop  1 Drop Both Eyes BID Ollie Dunham MD   1 Drop at 02/08/23 1753    pantoprazole (PROTONIX) tablet 40 mg  40 mg Oral ACB Ollie Dunham MD   40 mg at 02/08/23 0911    [Held by provider] rivaroxaban [facility]) tablet 15 mg  15 mg Oral DAILY Ollie Dunham MD        therapeutic multivitamin [facility]) tablet 1 Tablet  1 Tablet Oral DAILY Ollie Dunham MD   1 Tablet at 02/08/23 0913    acetaminophen (TYLENOL) tablet 650 mg  650 mg Oral Q4H PRN Ollie Dunham MD   650 mg at 02/08/23 2029    Or    acetaminophen (TYLENOL) solution 650 mg  650 mg Per NG tube Q4H PRN Ollie Dunham MD        Or    acetaminophen (TYLENOL) suppository 650 mg  650 mg Rectal Q4H PRN Ollie Dunham MD        magnesium hydroxide (MILK OF MAGNESIA) 400 mg/5 mL oral suspension 30 mL  30 mL Oral DAILY PRN Ollie Dunham MD        guaiFENesin ER [facility]) tablet 600 mg  600 mg Oral Q12H Ollie Dunham MD   600 mg at 02/08/23 2030 benzonatate (TESSALON) capsule 100 mg  100 mg Oral TID PRN Fide Peng MD        acetaminophen (TYLENOL) tablet 500 mg  500 mg Oral Q6H PRN Fide Peng MD        insulin lispro (HUMALOG) injection   SubCUTAneous AC&HS Fide Peng MD   2 Units at 02/04/23 1147    glucose chewable tablet 16 g  4 Tablet Oral PRN Fide Peng MD        glucagon [facility]) injection 1 mg  1 mg IntraMUSCular PRN Fide Peng MD        dextrose 10% infusion 0-250 mL  0-250 mL IntraVENous PRN Fide Peng MD           No Known Allergies    Review of Systems:    A comprehensive review of systems was negative except for: Neurological: positive for paresthesia and weakness    Objective:    Objective:     Patient Vitals for the past 24 hrs:   BP Temp Pulse Resp SpO2   02/08/23 1530 (!) 144/68 98.8 °F (37.1 °C) (!) 59 18 100 %   02/08/23 1110 (!) 131/59 97.7 °F (36.5 °C) (!) 50 17 100 %   02/08/23 0745 137/65 98.8 °F (37.1 °C) (!) 55 16 100 %   02/07/23 2312 (!) 159/66 97.5 °F (36.4 °C) (!) 54 18 100 %         Lab Review   Recent Results (from the past 24 hour(s))   GLUCOSE, POC    Collection Time: 02/07/23  9:03 PM   Result Value Ref Range    Glucose (POC) 111 65 - 117 mg/dL    Performed by Ryann Disla RN    GLUCOSE, POC    Collection Time: 02/08/23  7:38 AM   Result Value Ref Range    Glucose (POC) 102 65 - 117 mg/dL    Performed by [address] [place] [address], POC    Collection Time: 02/08/23 11:13 AM   Result Value Ref Range    Glucose (POC) 107 65 - 117 mg/dL    Performed by Alondra Mcgee (KEVIN RN)            Additional comments:I personally viewed and interpreted the patient's MRI scan and CT scans  MRI Results (most recent):  Results from [facility] encounter on 02/03/23    MRI BRAIN W WO CONT    Narrative  PRELIMINARY REPORT  No emergent process.  Restricted diffusion surrounding the area of enhancement  previously identified    Preliminary report was provided by Dr. Radha Shelton  Final report to follow. END PRELIMINARY REPORT      EXAM:  MRI BRAIN W WO CONT    INDICATION:    Abnormal ring-enhancing appearance of the right caudate and  putamen, recent CVA 3wks ago    COMPARISON:  CTA head neck 2/3/2023. CONTRAST: 12 ml ProHance. TECHNIQUE:  Multiplanar multisequence acquisition without and with contrast of the brain. FINDINGS:  Subacute infarct in the right corona radiata/basal ganglia with associated  subacute hematomas within the right caudate, putamen and globus pallidus with  associated peripheral enhancement within the right caudate and putamen. Generalized parenchymal volume loss with commensurate dilation of the sulci and  ventricular system. Incidental cavum vergae. Confluent areas of periventricular  and deep white matter T2/FLAIR hyperintensity, consistent with moderate to  severe chronic microvascular ischemic disease. Tiny chronic infarct in the right  cerebellum. There is no cerebellar tonsillar herniation. Expected arterial  flow-voids are present. Incidental small right cerebellar developmental venous  anomaly. The paranasal sinuses are clear. Trace left mastoid effusion. The orbital  contents are within normal limits with bilateral lens implants. No significant  osseous or scalp lesions are identified. Grade 1 anterolisthesis of C3 on C4  secondary to severe facet arthropathy in the cervical spine, with multilevel  degenerative disc disease also noted. Impression  1. Subacute hemorrhage with associated peripheral enhancement throughout the  right basal ganglia, associated with a subacute infarct in the right corona  radiata/basal ganglia. Follow-up brain MRI in 6-8 weeks is recommended to  establish expected evolution of the suspected subacute infarct with hemorrhagic  conversion. 2. Generalized parenchymal volume loss and moderate to severe chronic  microvascular ischemic disease. Tiny chronic infarct in the right cerebellum.       Results from [facility] encounter on 02/03/23    MRI BRAIN W WO CONT    Narrative  PRELIMINARY REPORT  No emergent process. Restricted diffusion surrounding the area of enhancement  previously identified    Preliminary report was provided by Dr. Portillo Avalos  Final report to follow. END PRELIMINARY REPORT      EXAM:  MRI BRAIN W WO CONT    INDICATION:    Abnormal ring-enhancing appearance of the right caudate and  putamen, recent CVA 3wks ago    COMPARISON:  CTA head neck 2/3/2023. CONTRAST: 12 ml ProHance. TECHNIQUE:  Multiplanar multisequence acquisition without and with contrast of the brain. FINDINGS:  Subacute infarct in the right corona radiata/basal ganglia with associated  subacute hematomas within the right caudate, putamen and globus pallidus with  associated peripheral enhancement within the right caudate and putamen. Generalized parenchymal volume loss with commensurate dilation of the sulci and  ventricular system. Incidental cavum vergae. Confluent areas of periventricular  and deep white matter T2/FLAIR hyperintensity, consistent with moderate to  severe chronic microvascular ischemic disease. Tiny chronic infarct in the right  cerebellum. There is no cerebellar tonsillar herniation. Expected arterial  flow-voids are present. Incidental small right cerebellar developmental venous  anomaly. The paranasal sinuses are clear. Trace left mastoid effusion. The orbital  contents are within normal limits with bilateral lens implants. No significant  osseous or scalp lesions are identified. Grade 1 anterolisthesis of C3 on C4  secondary to severe facet arthropathy in the cervical spine, with multilevel  degenerative disc disease also noted. Impression  1. Subacute hemorrhage with associated peripheral enhancement throughout the  right basal ganglia, associated with a subacute infarct in the right corona  radiata/basal ganglia.  Follow-up brain MRI in 6-8 weeks is recommended to  establish expected evolution of the suspected subacute infarct with hemorrhagic  conversion. 2. Generalized parenchymal volume loss and moderate to severe chronic  microvascular ischemic disease. Tiny chronic infarct in the right cerebellum. NEUROLOGICAL EXAM:    Appearance: The patient is well developed, well nourished, provides a coherent history and is in no acute distress. Mental Status: Oriented to time, place and person and the president, cognitive function and fund of knowledge is probably normal for her age. Speech is fluent without aphasia or dysarthria. Mood and affect appropriate. Cranial Nerves:   Intact visual fields. Fundi are benign, discs are flat, no lesions seen on funduscopy. ROVERTO, EOM's full, no nystagmus, no ptosis. Facial sensation is normal. Corneal reflexes are not tested. Facial movement is weak on the left. Hearing is normal bilaterally. Palate is midline with normal sternocleidomastoid and trapezius muscles are normal. Tongue is midline. Neck without meningismus or bruits   Motor:  5/5 strength in upper and lower proximal and distal muscles on the right, but dense almost flaccid left hemiplegia persists. Normal bulk and tone. No fasciculations. Rapid alternating movement is symmetric and intact in all extremities   Reflexes:   Deep tendon reflexes 1+/4 and symmetrical.  No babinski or clonus present   Sensory:   Normal to touch, pinprick and temperature and vibration. DSS is intact   Gait:  Not testable    Tremor:   No tremor noted. Cerebellar:  Not tested abnormal cerebellar signs present on Romberg, tandem, and finger-nose-finger exam.   Neurovascular:  Abnormal heart sounds and irregular rhythm, peripheral pulses decreased and no carotid bruits. Assessment:         ICD-10-CM ICD-9-CM    1. Encephalopathy [G93.40 (ICD-10-CM)]  G93.40 348.30       2. Stroke-like episode [R29.90 (ICD-10-CM)]  R29.90 781.99       3.  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  We will repeat her CT scan tomorrow to make sure there is no enlarging hematoma, and if that is stable we can probably let her go back to rehab and repeat her scan in a week to 2 as an outpatient. Discussed with the patient and her son in detail and they agree with plans and therapy. Discussed with nursing staff also. Continue excellent medical care, 45 minutes spent with the patient going over her chest, reviewing her scans personally, and discussing her condition with the patient and wife and the staff all in detail. Patient's CT scan was stable today, no increase in the hemorrhage, and she remains the same on exam with fairly dense left hemiplegia and she is almost flaccid on the left, but not complaining of any headache or other new neurological symptoms. Cardiology feels weak and holds her anticoagulation for 4 weeks, and we should see her back in clinic first and get a CT scan before we restart anticoagulation to make sure there is complete resolution of the hemorrhage.   Otherwise she is probably close to discharge back to rehab  35 minutes spent with the patient reviewing her records, personally reviewing her cardiology note and talking to cardiology about timing of anticoagulation, and reviewing her CT scan personally on the PACS system also, and I agree with report as dictated, and discussing her case with the nursing staff, very important that we make sure there is no progression of her intracranial hemorrhage  Patient stable, still waiting for nursing home, I will follow as needed, she can follow-up in stroke clinic in 2 to 4 weeks and we will repeat the CT scan before restarting anticoagulation. 25-minute spent with the patient and reviewing the chart and talking to nurses and caregiver.     Signed:  Devi Horton MD  2/8/2023  4:19 PM    None  None

## 2023-02-09 NOTE — PROGRESS NOTES
Problem: Patient Education: Go to Patient Education Activity  Goal: Patient/Family Education  Outcome: Progressing Towards Goal     Problem: TIA/CVA Stroke: 0-24 hours  Goal: Off Pathway (Use only if patient is Off Pathway)  Outcome: Progressing Towards Goal  Goal: Activity/Safety  Outcome: Progressing Towards Goal  Goal: Consults, if ordered  Outcome: Progressing Towards Goal  Goal: Diagnostic Test/Procedures  Outcome: Progressing Towards Goal  Goal: Nutrition/Diet  Outcome: Progressing Towards Goal  Goal: Discharge Planning  Outcome: Progressing Towards Goal  Goal: Medications  Outcome: Progressing Towards Goal  Goal: Respiratory  Outcome: Progressing Towards Goal  Goal: Treatments/Interventions/Procedures  Outcome: Progressing Towards Goal  Goal: Minimize risk of bleeding post-thrombolytic infusion  Outcome: Progressing Towards Goal  Goal: Monitor for complications post-thrombolytic infusion  Outcome: Progressing Towards Goal  Goal: Psychosocial  Outcome: Progressing Towards Goal  Goal: *Hemodynamically stable  Outcome: Progressing Towards Goal  Goal: *Neurologically stable  Description: Absence of additional neurological deficits    Outcome: Progressing Towards Goal  Goal: *Verbalizes anxiety and depression are reduced or absent  Outcome: Progressing Towards Goal  Goal: *Absence of Signs of Aspiration on Current Diet  Outcome: Progressing Towards Goal  Goal: *Absence of deep venous thrombosis signs and symptoms(Stroke Metric)  Outcome: Progressing Towards Goal  Goal: *Ability to perform ADLs and demonstrates progressive mobility and function  Outcome: Progressing Towards Goal  Goal: *Stroke education started(Stroke Metric)  Outcome: Progressing Towards Goal  Goal: *Dysphagia screen performed(Stroke Metric)  Outcome: Progressing Towards Goal  Goal: *Rehab consulted(Stroke Metric)  Outcome: Progressing Towards Goal     Problem: TIA/CVA Stroke: Day 2 Until Discharge  Goal: Off Pathway (Use only if patient is Off Pathway)  Outcome: Progressing Towards Goal  Goal: Activity/Safety  Outcome: Progressing Towards Goal  Goal: Diagnostic Test/Procedures  Outcome: Progressing Towards Goal  Goal: Nutrition/Diet  Outcome: Progressing Towards Goal  Goal: Discharge Planning  Outcome: Progressing Towards Goal  Goal: Medications  Outcome: Progressing Towards Goal  Goal: Respiratory  Outcome: Progressing Towards Goal  Goal: Treatments/Interventions/Procedures  Outcome: Progressing Towards Goal  Goal: Psychosocial  Outcome: Progressing Towards Goal  Goal: *Verbalizes anxiety and depression are reduced or absent  Outcome: Progressing Towards Goal  Goal: *Absence of aspiration  Outcome: Progressing Towards Goal  Goal: *Absence of deep venous thrombosis signs and symptoms(Stroke Metric)  Outcome: Progressing Towards Goal  Goal: *Optimal pain control at patient's stated goal  Outcome: Progressing Towards Goal  Goal: *Tolerating diet  Outcome: Progressing Towards Goal  Goal: *Ability to perform ADLs and demonstrates progressive mobility and function  Outcome: Progressing Towards Goal  Goal: *Stroke education continued(Stroke Metric)  Outcome: Progressing Towards Goal     Problem: Ischemic Stroke: Discharge Outcomes  Goal: *Verbalizes anxiety and depression are reduced or absent  Outcome: Progressing Towards Goal  Goal: *Verbalize understanding of risk factor modification(Stroke Metric)  Outcome: Progressing Towards Goal  Goal: *Hemodynamically stable  Outcome: Progressing Towards Goal  Goal: *Absence of aspiration pneumonia  Outcome: Progressing Towards Goal  Goal: *Aware of needed dietary changes  Outcome: Progressing Towards Goal  Goal: *Verbalize understanding of prescribed medications including anti-coagulants, anti-lipid, and/or anti-platelets(Stroke Metric)  Outcome: Progressing Towards Goal  Goal: *Tolerating diet  Outcome: Progressing Towards Goal  Goal: *Aware of follow-up diagnostics related to anticoagulants  Outcome: Progressing Towards Goal  Goal: *Ability to perform ADLs and demonstrates progressive mobility and function  Outcome: Progressing Towards Goal  Goal: *Absence of DVT(Stroke Metric)  Outcome: Progressing Towards Goal  Goal: *Absence of aspiration  Outcome: Progressing Towards Goal  Goal: *Optimal pain control at patient's stated goal  Outcome: Progressing Towards Goal  Goal: *Home safety concerns addressed  Outcome: Progressing Towards Goal  Goal: *Describes available resources and support systems  Outcome: Progressing Towards Goal  Goal: *Verbalizes understanding of activation of EMS(911) for stroke symptoms(Stroke Metric)  Outcome: Progressing Towards Goal  Goal: *Understands and describes signs and symptoms to report to providers(Stroke Metric)  Outcome: Progressing Towards Goal  Goal: *Neurolgocially stable (absence of additional neurological deficits)  Outcome: Progressing Towards Goal  Goal: *Verbalizes importance of follow-up with primary care physician(Stroke Metric)  Outcome: Progressing Towards Goal  Goal: *Smoking cessation discussed,if applicable(Stroke Metric)  Outcome: Progressing Towards Goal  Goal: *Depression screening completed(Stroke Metric)  Outcome: Progressing Towards Goal     Problem: Pain  Goal: *Control of Pain  Outcome: Progressing Towards Goal  Goal: *PALLIATIVE CARE:  Alleviation of Pain  Outcome: Progressing Towards Goal     Problem: Patient Education: Go to Patient Education Activity  Goal: Patient/Family Education  Outcome: Progressing Towards Goal     Problem: Falls - Risk of  Goal: *Absence of Falls  Description: Document Aylin Fall Risk and appropriate interventions in the flowsheet.   Outcome: Progressing Towards Goal  Note: Fall Risk Interventions:            Medication Interventions: Bed/chair exit alarm, Patient to call before getting OOB    Elimination Interventions: Call light in reach, Bed/chair exit alarm              Problem: Patient Education: Go to Patient Education Activity  Goal: Patient/Family Education  Outcome: Progressing Towards Goal     Problem: Risk for Spread of Infection  Goal: Prevent transmission of infectious organism to others  Description: Prevent the transmission of infectious organisms to other patients, staff members, and visitors. Outcome: Progressing Towards Goal     Problem: Patient Education:  Go to Education Activity  Goal: Patient/Family Education  Outcome: Progressing Towards Goal     Problem: Patient Education: Go to Patient Education Activity  Goal: Patient/Family Education  Outcome: Progressing Towards Goal     Problem: Patient Education: Go to Patient Education Activity  Goal: Patient/Family Education  Outcome: Progressing Towards Goal     Problem: Pressure Injury - Risk of  Goal: *Prevention of pressure injury  Description: Document Mikey Scale and appropriate interventions in the flowsheet. Outcome: Progressing Towards Goal  Note: Pressure Injury Interventions:  Sensory Interventions: Assess changes in LOC, Turn and reposition approx. every two hours (pillows and wedges if needed)    Moisture Interventions: Apply protective barrier, creams and emollients, Check for incontinence Q2 hours and as needed, Absorbent underpads    Activity Interventions: PT/OT evaluation, Assess need for specialty bed    Mobility Interventions: HOB 30 degrees or less, Turn and reposition approx.  every two hours(pillow and wedges)    Nutrition Interventions: Document food/fluid/supplement intake    Friction and Shear Interventions: HOB 30 degrees or less, Apply protective barrier, creams and emollients                Problem: Patient Education: Go to Patient Education Activity  Goal: Patient/Family Education  Outcome: Progressing Towards Goal     Problem: Patient Education: Go to Patient Education Activity  Goal: Patient/Family Education  Outcome: Progressing Towards Goal     Problem: Patient Education: Go to Patient Education Activity  Goal: Patient/Family Education  Outcome: Progressing Towards Goal

## 2023-02-09 NOTE — CONSULTS
Palliative Medicine Consult  [place]: [phone] ([phone])    Patient Name: Rabia Calles  YOB: 1932    Date of Initial Consult: 2/8/23  Date of Today's Visit: 2/9/2023  Reason for Consult: End stage disease  Requesting Provider: Dr. Naomi Kline   Primary Care Physician: None     SUMMARY:   Rabia Calles is a 80 y.o. with a past history of HTN, Afib, DM, who was admitted on 2/3/2023 from home with a diagnosis of CVA. Pt was [facility] from [facility] due to altered mental status. She had a stroke 3 weeks ago and was sent to rehab, she had residual left side weakness and dysarthria after that stroke. At rehab she was more confused and fell out of bed. Upon arrival she was found to have hemorrhagic conversion of her previous stroke. Current medical issues leading to Palliative Medicine involvement include: hemorrhagic conversion of previous stroke. Social: prior to stroke 1/2023 pt was living independently and was independent of all ADLs. She is a  and has no children. She is close to a cousin     Advance directive completed today  mPOA 1: Gee Richards (cousin) [phone]     PALLIATIVE DIAGNOSES:   Palliative care encounter  Goals of care  Hemiparesis  aphasia       PLAN:   Chart reviewed including labs, imaging, notes. Met with patient at bedside with Maribeth ESCOBAR, pt has insight into her illness. Called [facility] and discussed via phone call. Discussed pt function prior to the stroke in January. She was very independent and was able to perform all ADLs. She is a  and has no children. She is close with one relative, Chani who helps her with medical issues and arrangements  Advanced care planning: we discussed completing an advance directive to name a mPOA. She states she would want [facility] to make decisions for her. GOC: she did not want to address this at this time as she states she is tired.   Initial consult note routed to primary continuity provider and/or primary health care team members  Communicated plan of care with: Palliative [facility] Team     GOALS OF CARE / TREATMENT PREFERENCES:     GOALS OF CARE:  Patient/Health Care Proxy Stated Goals: Prolong life    TREATMENT PREFERENCES:   Code Status: Full Code      Advance Care Planning:  [] [facility] Interdisciplinary Team has updated the ACP Navigator with [address] and Patient Capacity      Primary Decision Maker: Reesa Rinne - Other Relative - [phone]    Primary Decision Maker: Bubba Mayes - Other Relative - [phone]  Advance Care Planning 2/4/2023   Patient's Healthcare Decision Maker is: Named in scanned ACP document   Confirm Advance Directive Yes, on file       Medical Interventions: Full interventions       Other:    As far as possible, the palliative care team has discussed with patient / health care proxy about goals of care / treatment preferences for patient. HISTORY:     History obtained from: patient    CHIEF COMPLAINT: I'm tired    HPI/SUBJECTIVE:    The patient is:   [x] Verbal and participatory  [] Non-participatory due to:   Pt states she is tired. She has had a lot of changes in her life since her stroke.  She denies sob, anorexia, no pain     Clinical Pain Assessment (nonverbal scale for severity on nonverbal patients):   Clinical Pain Assessment  Severity: 0          Duration: for how long has pt been experiencing pain (e.g., 2 days, 1 month, years)  Frequency: how often pain is an issue (e.g., several times per day, once every few days, constant)     FUNCTIONAL ASSESSMENT:     Palliative Performance Scale (PPS):  PPS: 40       PSYCHOSOCIAL/SPIRITUAL SCREENING:     Palliative IDT has assessed this patient for cultural preferences / practices and a referral made as appropriate to needs (Cultural Services, Patient Advocacy, Ethics, etc.)    Any spiritual / Yazidi concerns:  [] Yes /  [x] No   If \"Yes\" to discuss with pastoral care during IDT     Does caregiver feel burdened by caring for their loved one:   [] Yes /  [x] No /  [] No Caregiver Present/Available [] No Caregiver [] Pt Lives at Facility  If \"Yes\" to discuss with social work during IDT    Anticipatory grief assessment:   [x] Normal  / [] Maladaptive     If \"Maladaptive\" to discuss with social work during IDT    ESAS Anxiety: Anxiety: 0    ESAS Depression: Depression: 2        REVIEW OF SYSTEMS:     Positive and pertinent negative findings in ROS are noted above in HPI. The following systems were [x] reviewed / [] unable to be reviewed as noted in HPI  Other findings are noted below. Systems: constitutional, ears/nose/mouth/throat, respiratory, gastrointestinal, genitourinary, musculoskeletal, integumentary, neurologic, psychiatric, endocrine. Positive findings noted below. Modified ESAS Completed by: provider   Fatigue: 4 Drowsiness: 4   Depression: 2 Pain: 0   Anxiety: 0 Nausea: 0   Anorexia: 0 Dyspnea: 0     Constipation: No              PHYSICAL EXAM:     From RN flowsheet:  Wt Readings from Last 3 Encounters:   02/04/23 60.7 kg (133 lb 12.8 oz)     Blood pressure 121/63, pulse (!) 56, temperature 97.5 °F (36.4 °C), resp. rate 13, height 5' 8\" (1.727 m), weight 60.7 kg (133 lb 12.8 oz), SpO2 100 %. Pain Scale 1: Numeric (0 - 10)  Pain Intensity 1: 0  Pain Onset 1: acute  Pain Location 1: Knee, Hip  Pain Orientation 1: Posterior, Right (right side)  Pain Description 1: Intermittent  Pain Intervention(s) 1: Medication (see MAR)  Last bowel movement, if known:     Constitutional: sleeping but arousable.  Oriented x3  Eyes: pupils equal, anicteric  ENMT: no nasal discharge, moist mucous membranes  Cardiovascular: regular rhythm, distal pulses intact  Respiratory: breathing not labored, symmetric  Gastrointestinal: soft non-tender, +bowel sounds  Musculoskeletal: no deformity, no tenderness to palpation  Skin: warm, dry  Neurologic: following commands, left hemiparesis and facial droop  Psychiatric: full affect, no hallucinations  Other:       HISTORY:     Active Problems:    Stroke-like episode (2/3/2023)      History of hemorrhagic stroke with residual hemiplegia ([facility] [address] (2/4/2023)      Hemiplegia of left nondominant side as late effect of cerebrovascular disease ([facility] [address] (2/4/2023)    Past Medical History:   Diagnosis Date    Atrial fibrillation (HCC)     CVA (cerebral vascular accident) ([facility] [address]     GERD (gastroesophageal reflux disease)     Glaucoma     HTN (hypertension)     Hyperlipidemia       No past surgical history on file. Family History   Problem Relation Age of Onset    No Known Problems Mother     No Known Problems Father       History reviewed, no pertinent family history.   Social History     Tobacco Use    Smoking status: Never    Smokeless tobacco: Never   Substance Use Topics    Alcohol use: Not Currently     No Known Allergies   Current Facility-Administered Medications   Medication Dose Route Frequency    lactulose (CHRONULAC) 10 gram/15 mL solution 15 mL  10 g Oral BID    atorvastatin (LIPITOR) tablet 20 mg  20 mg Oral QHS    amiodarone (CORDARONE) tablet 100 mg  100 mg Oral DAILY    melatonin tablet 3 mg  3 mg Oral QHS PRN    sodium chloride (NS) flush 5-10 mL  5-10 mL IntraVENous PRN    aspirin chewable tablet 81 mg  81 mg Oral DAILY    latanoprost (XALATAN) 0.005 % ophthalmic solution 1 Drop  1 Drop Both Eyes QHS    levothyroxine (SYNTHROID) tablet 75 mcg  75 mcg Oral ACB    [Held by provider] losartan (COZAAR) tablet 100 mg  100 mg Oral DAILY    amLODIPine (NORVASC) tablet 5 mg  5 mg Oral DAILY    timolol (TIMOPTIC) 0.5 % ophthalmic solution 1 Drop  1 Drop Both Eyes BID    pantoprazole (PROTONIX) tablet 40 mg  40 mg Oral ACB    [Held by provider] rivaroxaban (XARELTO) tablet 15 mg  15 mg Oral DAILY    therapeutic multivitamin (THERAGRAN) tablet 1 Tablet  1 Tablet Oral DAILY    acetaminophen (TYLENOL) tablet 650 mg  650 mg Oral Q4H PRN    Or    acetaminophen (TYLENOL) solution 650 mg  650 mg Per NG tube Q4H PRN    Or    acetaminophen (TYLENOL) suppository 650 mg  650 mg Rectal Q4H PRN    magnesium hydroxide (MILK OF MAGNESIA) 400 mg/5 mL oral suspension 30 mL  30 mL Oral DAILY PRN    guaiFENesin ER (MUCINEX) tablet 600 mg  600 mg Oral Q12H    benzonatate (TESSALON) capsule 100 mg  100 mg Oral TID PRN    acetaminophen (TYLENOL) tablet 500 mg  500 mg Oral Q6H PRN    insulin lispro (HUMALOG) injection   SubCUTAneous AC&HS    glucose chewable tablet 16 g  4 Tablet Oral PRN    glucagon (GLUCAGEN) injection 1 mg  1 mg IntraMUSCular PRN    dextrose 10% infusion 0-250 mL  0-250 mL IntraVENous PRN          LAB AND IMAGING FINDINGS:     Lab Results   Component Value Date/Time    WBC 4.2 02/05/2023 03:33 AM    HGB 12.2 02/05/2023 03:33 AM    PLATELET 303 21/97/8315 03:33 AM     Lab Results   Component Value Date/Time    Sodium 142 02/05/2023 03:33 AM    Potassium 3.9 02/05/2023 03:33 AM    Chloride 112 (H) 02/05/2023 03:33 AM    CO2 25 02/05/2023 03:33 AM    BUN 25 (H) 02/05/2023 03:33 AM    Creatinine 0.76 02/05/2023 03:33 AM    Calcium 9.1 02/05/2023 03:33 AM      Lab Results   Component Value Date/Time    Alk. phosphatase 90 02/03/2023 09:25 AM    Protein, total 7.1 02/03/2023 09:25 AM    Albumin 2.7 (L) 02/03/2023 09:25 AM    Globulin 4.4 (H) 02/03/2023 09:25 AM     Lab Results   Component Value Date/Time    INR 1.5 (H) 02/03/2023 10:01 AM    Prothrombin time 15.2 (H) 02/03/2023 10:01 AM      No results found for: IRON, FE, TIBC, IBCT, PSAT, FERR   No results found for: PH, PCO2, PO2  No components found for: GLPOC   No results found for: CPK, CKMB             Total time: 55 minutes  Counseling / coordination time, spent as noted above:   > 50% counseling / coordination?:     Prolonged service was provided for  []30 min   []75 min in face to face time in the presence of the patient, spent as noted above.   Time Start:   Time End:   Note: this can only be billed with 16991 (initial) or 82153 (follow up). If multiple start / stop times, list each separately.

## 2023-02-09 NOTE — PROGRESS NOTES
Hospitalist Progress Note    Subjective:   Daily Progress Note: 2/9/2023 2:24 PM    Hospital Course:  Alicja Johnson is a 80 y.o.  female who presents with PMH of HTN, afib, CVA, DM, glaucoma, hypothyroidism, and GERD. Patient presenting from [facility] after episode of AMS this morning. Reportedly she fell from bed level yesterday and did not hit her head. Yesterday post fall she was fine at new baseline of bed bound with some dysarthria, however able to talk. This morning staff noted that she was altered by finding that she only responded to pain. Per son when he presented to ED, patient was back to her new baseline post CVA. Prior to stroke 3 weeks ago, she was living independently without a assisted walking device. She was driving and managing her ADLs independently with some short term memory difficulties. Since 1/2023 CVA she has been in rehab with left sided hemiplegia and left sided facial droop. MRI brain shows subacute hemorrhage associated with subacute infarct in right corona radiata/basal ganglia. Subjective:  -No events overnight    Assessment / Plan:  Acute encephalopathy:  Hemorrhagic transformation of recent stroke    MRI brain shows subacute hemorrhage associated with subacute infarct in right corona radiata/basal ganglia. Repeat CT head 2/5 stable hemorrhage  Repeat CT head in 1-2 week as outpatient. Neurology recommends to be off for xarelto for 4-6 weeks. Has elevated ammonia level- started on lactulose      Cough:  Rhino and enterovirus +  Mucinex    Hypertension  Afib:  Hold xarelto in the setting of subacute hemorrhage  Hold  losartan, metoprolol  Resume amlodipine  Started on amiodarone at lower dose 100 mg po daily due to bradycardia  Appreciate cardiology consult  Outpatient cardiology follow up for watchman evaluation.      Diabetes mellitus:  Continue sliding scale    Glaucoma:  Continue eye drops        18.5 - 24.9 Normal weight / Body mass index is 20.34 kg/m². Code status: Full  Prophylaxis: SCD's  Recommended Disposition: LTC    -SNF denied, did P2P but still refused.  Can go to LTC/Home       Current Facility-Administered Medications   Medication Dose Route Frequency    lactulose (CHRONULAC) 10 gram/15 mL solution 15 mL  10 g Oral BID    atorvastatin (LIPITOR) tablet 20 mg  20 mg Oral QHS    amiodarone (CORDARONE) tablet 100 mg  100 mg Oral DAILY    melatonin tablet 3 mg  3 mg Oral QHS PRN    sodium chloride (NS) flush 5-10 mL  5-10 mL IntraVENous PRN    aspirin chewable tablet 81 mg  81 mg Oral DAILY    latanoprost (XALATAN) 0.005 % ophthalmic solution 1 Drop  1 Drop Both Eyes QHS    levothyroxine (SYNTHROID) tablet 75 mcg  75 mcg Oral ACB    [Held by provider] losartan (COZAAR) tablet 100 mg  100 mg Oral DAILY    amLODIPine (NORVASC) tablet 5 mg  5 mg Oral DAILY    timolol (TIMOPTIC) 0.5 % ophthalmic solution 1 Drop  1 Drop Both Eyes BID    pantoprazole (PROTONIX) tablet 40 mg  40 mg Oral ACB    [Held by provider] rivaroxaban (XARELTO) tablet 15 mg  15 mg Oral DAILY    therapeutic multivitamin (THERAGRAN) tablet 1 Tablet  1 Tablet Oral DAILY    acetaminophen (TYLENOL) tablet 650 mg  650 mg Oral Q4H PRN    Or    acetaminophen (TYLENOL) solution 650 mg  650 mg Per NG tube Q4H PRN    Or    acetaminophen (TYLENOL) suppository 650 mg  650 mg Rectal Q4H PRN    magnesium hydroxide (MILK OF MAGNESIA) 400 mg/5 mL oral suspension 30 mL  30 mL Oral DAILY PRN    guaiFENesin ER (MUCINEX) tablet 600 mg  600 mg Oral Q12H    benzonatate (TESSALON) capsule 100 mg  100 mg Oral TID PRN    acetaminophen (TYLENOL) tablet 500 mg  500 mg Oral Q6H PRN    insulin lispro (HUMALOG) injection   SubCUTAneous AC&HS    glucose chewable tablet 16 g  4 Tablet Oral PRN    glucagon (GLUCAGEN) injection 1 mg  1 mg IntraMUSCular PRN    dextrose 10% infusion 0-250 mL  0-250 mL IntraVENous PRN        Review of Systems  Constitutional: No fevers, No chills, No sweats, No fatigue, No Weakness  Eyes: No redness  Ears, nose, mouth, throat, and face: No nasal congestion, No sore throat, No voice change  Respiratory: No Shortness of Breath, No cough, No wheezing  Cardiovascular: No chest pain, No palpitations, No extremity edema  Gastrointestinal: No nausea, No vomiting, No diarrhea, No abdominal pain  Genitourinary: No frequency, No dysuria, No hematuria  Integument/breast: No skin lesion(s)   Neurological: has mild Confusion, No headaches, No dizziness      Objective:     Visit Vitals  BP (!) 141/64   Pulse 63   Temp 98.1 °F (36.7 °C)   Resp 14   Ht 5' 8\" (1.727 m)   Wt 60.7 kg (133 lb 12.8 oz)   SpO2 100%   BMI 20.34 kg/m²      O2 Device: None (Room air)    Temp (24hrs), Av.3 °F (36.8 °C), Min:97.9 °F (36.6 °C), Max:98.8 °F (37.1 °C)      No intake/output data recorded.  1901 -  0700  In: -   Out: 1     PHYSICAL EXAM:  Constitutional: No acute distress  Skin: Extremities and face reveal no rashes. HEENT: Sclerae anicteric. Extra-occular muscles are intact. No oral ulcers. The neck is supple and no masses. Cardiovascular: Regular rate and rhythm. Respiratory:  Clear breath sounds bilaterally with no wheezes, rales, or rhonchi. GI: Abdomen nondistended, soft, and nontender. Normal active bowel sounds. Musculoskeletal: No pitting edema of the lower legs. Able to move all ext  Neurological:  Patient was sleepy this morning.    Psychiatric: Mood appears appropriate       Data Review    Recent Results (from the past 24 hour(s))   GLUCOSE, POC    Collection Time: 23  9:43 PM   Result Value Ref Range    Glucose (POC) 131 (H) 65 - 117 mg/dL    Performed by Darci Garter PCT    GLUCOSE, POC    Collection Time: 23  6:53 AM   Result Value Ref Range    Glucose (POC) 105 65 - 117 mg/dL    Performed by Darci Garter PCT    GLUCOSE, POC    Collection Time: 23 11:41 AM   Result Value Ref Range    Glucose (POC) 159 (H) 65 - 117 mg/dL    Performed by Jerome Promise PCT Time spent 35 minutes involving direct patient care as well as reviewing patient's labs and coordination of care with nursing staff     Care Plan discussed with: Patient/Family/RN/Case Management        Total time spent with patient: 35 minutes.

## 2023-02-09 NOTE — PROGRESS NOTES
End of Shift Note     Bedside shift change report given to Christine Dudley (oncoming nurse) by Osmel Jaramillo (offgoing nurse). Report included the following information SBAR and Kardex     Shift worked: Nights   Shift summary and any significant changes:     None, pt was turn every 2 hours  Waiting for placement        Concerns for physician to address:  None   Zone phone for oncoming shift:  [phone]      Patient Information  Sandhya Castellanos  80 y.o.  2/3/2023  9:02 AM by Marilynn Rhodes MD. Sandhya Castellanos was admitted from [facility] Rehab     Problem List       Patient Active Problem List     Diagnosis Date Noted    History of hemorrhagic stroke with residual hemiplegia ([facility] [address] 02/04/2023    Hemiplegia of left nondominant side as late effect of cerebrovascular disease ([facility] [address] 02/04/2023    Stroke-like episode 02/03/2023           Past Medical History:   Diagnosis Date    Atrial fibrillation ([address]      CVA (cerebral vascular accident) ([address]      GERD (gastroesophageal reflux disease)      Glaucoma      HTN (hypertension)      Hyperlipidemia           Core Measures:  CVA: Yes Yes  CHF:No No  PNA:No No     Activity:  Activity Level: Bed Rest  Number times ambulated in hallways past shift: 0  Number of times OOB to chair past shift: 0     Cardiac:   Cardiac Monitoring: No      Cardiac Rhythm: Sinus Adria Carlisle     Access:   Current line(s): PIV   Central Line? No Placement date  Reason Medically Necessary   PICC LINE? No Placement date Reason Medically Necessary      Genitourinary:   Urinary status: voiding  Urinary Catheter?  No Placement Date  Reason Medically Necessary      Respiratory:   O2 Device: None (Room air)  Chronic home O2 use?: NO  Incentive spirometer at bedside: NO     GI:  Last Bowel Movement Date: 02/06/23  Current diet:  DIET ONE TIME MESSAGE  ADULT DIET Dysphagia - Pureed  Passing flatus: YES  Tolerating current diet: YES     Pain Management:   Patient states pain is manageable on current regimen: YES Skin:  Mikey Score: 15  Interventions: increase time out of bed, PT/OT consult, and nutritional support     Patient Safety:  Fall Score:    Interventions: bed/chair alarm, assistive device (walker, cane, etc), gripper socks, and pt to call before getting OOB  @Rollbelt  @dexterity to release roll belt  Yes/No ( must document dexterity  here by stating Yes or No here, otherwise this is a restraint and must follow restraint documentation policy.)     DVT prophylaxis:  DVT prophylaxis Med- No  DVT prophylaxis SCD or JOSELO- No      Wounds: (If Applicable)  Wounds- No  Location      Active Consults:  IP CONSULT TO NEUROLOGY  IP CONSULT TO CARDIOLOGY     Length of Stay:  Expected LOS: Until patient finds placement  Actual LOS: 4  Discharge Plan: Yes Pending placement

## 2023-02-09 NOTE — ACP (ADVANCE CARE PLANNING)
Palliative Medicine      Code Status: Full Code    Advance Care Planning:  Advance Care Planning 2/4/2023   Patient's Healthcare Decision Maker is: Named in scanned ACP document   Confirm Advance Directive Yes, on file     Patient / Family Encounter Documentation    Participants (names): Ms. Salena Huerta, Dr. Eusebia Gerard, [place]    Narrative: This writer joined Dr. Maggie Anthony and patient who were discussing AMD.  Confirmed patient's wish to appoint her cousin Kari as her primary and sole health care agent. Made copies for chart and gave patient the original.    Patient is  and has no children. Arlen Mcguire is her sister. Thank you for including Palliative Medicine in the care of Ms. Gisell Gomez.     Camille Garrison, [facility]  [phone] ([phone])

## 2023-02-09 NOTE — PROGRESS NOTES
Problem: Dysphagia (Adult)  Goal: *Acute Goals and Plan of Care (Insert Text)  Description: Speech pathology goals  Initiated 2/4/2023  1. Patient will tolerate soft and bite sized/thin liquid diet with no adverse effects within 7 days. Changed to Minced&Moist/Thin Liquid  Outcome: Progressing Towards Goal     Problem: Communication Impaired (Adult)  Goal: *Acute Goals and Plan of Care (Insert Text)  Description: Speech pathology goals  Initiated 2/4/2023   1. Patient will achieve 80% intelligibility with functional conversation within 7 days  Outcome: Progressing Towards Goal     [address] TREATMENT  Patient: Ghada Fisher (80 y.o. female)  Date: 2/9/2023  Diagnosis: Stroke-like episode [R29.90] <principal problem not specified>      Precautions: Bed Alarm, Contact    ASSESSMENT:  Patient continues to be followed by SLP for dysphagia management and motor speech treatment. SLP assisted patient with breakfast tray on this date. Note initial evaluation on 2/4 revealed suspected mild oral/pharyngeal dysphagia; Soft&Bite-Sized/Thin Liquid diet was recommended. Per chart review, diet changed to pureed by nursing staff. Patient observed with prolonged mastication with solid trials, consistent with initial evaluation, however suspect patient's oral phase approximately at baseline. Patient with slow yet eventual oral clearance. Patient tolerated rapid, sequential sips of thin liquid without overt difficulty or signs of aspiration. Patient observed with increased intelligibility at the sentence level on this date, however motor speech still impacted by blended word boundaries, imprecise articulation, and decreased breath support. Patient independently recalled \"loud speech\" as a motor speech intelligibility strategy, however required re-education provided on other strategies. At this time, recommend diet as outlined below. SLP to continue to follow acutely. PLAN:  Recommendations and Planned Interventions:  -- Minced&Moist/Thin Liquids  -- Medication as tolerated  -- Encourage loud, slow, clear speech    Patient continues to benefit from skilled intervention to address the above impairments. Continue treatment per established plan of care. Discharge Recommendations: To Be Determined     SUBJECTIVE:   Patient stated it's been soft re: consistency of food she's been receiving. OBJECTIVE:   Cognitive and Communication Status:  Neurologic State: Alert, Confused  Orientation Level: Oriented to person, Oriented to place, Disoriented to situation, Disoriented to time  Cognition: Decreased attention/concentration, Follows commands  Perception: Cues to maintain midline in sitting, Cues to attend to left side of body, Cues to attend left visual field, Tactile, Verbal, Visual  Perseveration: No perseveration noted  Safety/Judgement: Decreased insight into deficits    Dysphagia Treatment and Interventions:    P.O. Trials:  Patient Position: Upright in bed  Vocal quality prior to P.O.: No impairment  Consistency Presented: Thin liquid;Puree; Solid  How Presented: SLP-fed/presented;Spoon;Self-fed/presented;Straw     Bolus Acceptance: No impairment  Bolus Formation/Control: Impaired  Type of Impairment: Delayed;Mastication  Propulsion: Delayed (# of seconds)  Oral Residue: None        Aspiration Signs/Symptoms: None                      Speech Treatment and Interventions: Motor Speech:     Intelligibility: Impaired                       Dysarthric Characteristics: Decreased breath support;Blended word boundaries; Imprecise       Voice:   WFL       Response & Tolerance to Activities:         Pain:  Pain Scale 1: Numeric (0 - 10)  Pain Intensity 1: 0       After treatment:   Patient left in no apparent distress in bed, Call bell within reach, and Nursing notified    COMMUNICATION/EDUCATION:   The patient's plan of care including recommendations, planned interventions, and recommended diet changes were discussed with: Registered nurse.        Aura Lord, SLP  Time Calculation: 17 mins

## 2023-02-09 NOTE — PROGRESS NOTES
Spiritual Care Assessment/Progress Note  [facility]      NAME: Radha Rivas      MRN: [id number]  AGE: 80 y.o. SEX: female  [employer] Affiliation: Scientologist   Language: English     2/9/2023     Total Time (in minutes): 6     Spiritual Assessment begun in [facility] through conversation with:         []Patient        [] Family    [] Friend(s)        Reason for Consult: Initial/Spiritual assessment, patient floor     Spiritual beliefs: (Please include comment if needed)     [] Identifies with a jaciel tradition:         [] Supported by a jaciel community:            [] Claims no spiritual orientation:           [] Seeking spiritual identity:                [] Adheres to an individual form of spirituality:           [x] Not able to assess:                           Identified resources for coping:      [] Prayer                               [] Music                  [] Guided Imagery     [] Family/friends                 [] Pet visits     [] Devotional reading                         [x] Unknown     [] Other:                                                Interventions offered during this visit: (See comments for more details)    Patient Interventions:  Other (comment) (Attempt)           Plan of Care:     [] Support spiritual and/or cultural needs    [] Support AMD and/or advance care planning process      [] Support grieving process   [] Coordinate Rites and/or Rituals    [] Coordination with community clergy   [] No spiritual needs identified at this time   [] Detailed Plan of Care below (See Comments)  [] Make referral to Music Therapy  [] Make referral to Pet Therapy     [] Make referral to Addiction services  [] Make referral to [facility]  [] Make referral to Spiritual Care Partner  [] No future visits requested        [x] Contact Spiritual Care for further referrals     Comments:  Patient was in 131 when  attempted visiting for an initial spiritual assessment. She did not awake to 's greetings. Please contact spiritual care for any further referrals.        Visited by: Mateo Kovacs  To juma sullivan: [phone]

## 2023-02-09 NOTE — PROGRESS NOTES
Problem: TIA/CVA Stroke: 0-24 hours  Goal: Activity/Safety  Outcome: Progressing Towards Goal   BED ALARM ACTIVE, Q2 TURNS

## 2023-02-09 NOTE — PROGRESS NOTES
End of Shift Note     Bedside shift change report given to PAN Wren (oncoming nurse) by Bill Collins (offgoing nurse). Report included the following information SBAR and Kardex     Shift worked: days   Shift summary and any significant changes:     Very poor po intake, md aware  Palliative visited patient  Waiting for placement         Concerns for physician to address:  none   Zone phone for oncoming shift:  [phone]      Patient Information  Pratik Yun  80 y.o.  2/3/2023  9:02 AM by Eddy Perry MD. Pratik Yun was admitted from [facility]     Problem List       Patient Active Problem List     Diagnosis Date Noted    History of hemorrhagic stroke with residual hemiplegia ([facility] [address] 02/04/2023    Hemiplegia of left nondominant side as late effect of cerebrovascular disease ([address] 02/04/2023    Stroke-like episode 02/03/2023           Past Medical History:   Diagnosis Date    Atrial fibrillation ([address]      CVA (cerebral vascular accident) ([address]      GERD (gastroesophageal reflux disease)      Glaucoma      HTN (hypertension)      Hyperlipidemia           Core Measures:  CVA: Yes Yes  CHF:No No  PNA:No No     Activity:  Activity Level: Bed Rest  Number times ambulated in hallways past shift: 0  Number of times OOB to chair past shift: 0     Cardiac:   Cardiac Monitoring: No      Cardiac Rhythm: Sinus Jennifer Vaughn     Access:   Current line(s): PIV   Central Line? No Placement date  Reason Medically Necessary   PICC LINE? No Placement date Reason Medically Necessary      Genitourinary:   Urinary status: voiding  Urinary Catheter?  No Placement Date  Reason Medically Necessary      Respiratory:   O2 Device: None (Room air)  Chronic home O2 use?: NO  Incentive spirometer at bedside: NO     GI:  Last Bowel Movement Date: 02/06/23  Current diet:  DIET ONE TIME MESSAGE  ADULT DIET Dysphagia - Pureed  Passing flatus: YES  Tolerating current diet: YES     Pain Management:   Patient states pain is manageable on current regimen: YES     Skin:  Mikey Score: 15  Interventions: increase time out of bed, PT/OT consult, and nutritional support     Patient Safety:  Fall Score:    Interventions: bed/chair alarm, assistive device (walker, cane, etc), gripper socks, and pt to call before getting OOB  @Rollbelt  @dexterity to release roll belt  Yes/No ( must document dexterity  here by stating Yes or No here, otherwise this is a restraint and must follow restraint documentation policy.)     DVT prophylaxis:  DVT prophylaxis Med- No  DVT prophylaxis SCD or JOSELO- No      Wounds: (If Applicable)  Wounds- No  Location      Active Consults:  IP CONSULT TO NEUROLOGY  IP CONSULT TO CARDIOLOGY     Length of Stay:  Expected LOS: 2d 21h  Actual LOS: 4  Discharge Plan: Yes Pending placement

## 2023-02-10 LAB
GLUCOSE BLD STRIP.AUTO-MCNC: 101 MG/DL (ref 65–117)
GLUCOSE BLD STRIP.AUTO-MCNC: 107 MG/DL (ref 65–117)
GLUCOSE BLD STRIP.AUTO-MCNC: 117 MG/DL (ref 65–117)
GLUCOSE BLD STRIP.AUTO-MCNC: 129 MG/DL (ref 65–117)
GLUCOSE BLD STRIP.AUTO-MCNC: 147 MG/DL (ref 65–117)
SERVICE CMNT-IMP: ABNORMAL
SERVICE CMNT-IMP: ABNORMAL
SERVICE CMNT-IMP: NORMAL

## 2023-02-10 PROCEDURE — 74011636637 HC RX REV CODE- 636/637: Performed by: STUDENT IN AN ORGANIZED HEALTH CARE EDUCATION/TRAINING PROGRAM

## 2023-02-10 PROCEDURE — 74011000250 HC RX REV CODE- 250: Performed by: STUDENT IN AN ORGANIZED HEALTH CARE EDUCATION/TRAINING PROGRAM

## 2023-02-10 PROCEDURE — 74011250637 HC RX REV CODE- 250/637: Performed by: STUDENT IN AN ORGANIZED HEALTH CARE EDUCATION/TRAINING PROGRAM

## 2023-02-10 PROCEDURE — 82962 GLUCOSE BLOOD TEST: CPT

## 2023-02-10 PROCEDURE — 74011250637 HC RX REV CODE- 250/637: Performed by: INTERNAL MEDICINE

## 2023-02-10 PROCEDURE — 65270000046 HC RM TELEMETRY

## 2023-02-10 RX ORDER — AMIODARONE HYDROCHLORIDE 200 MG/1
100 TABLET ORAL DAILY
Qty: 15 TABLET | Refills: 0 | Status: SHIPPED
Start: 2023-02-10 | End: 2023-03-12

## 2023-02-10 RX ORDER — ATORVASTATIN CALCIUM 20 MG/1
20 TABLET, FILM COATED ORAL
Qty: 30 TABLET | Refills: 0 | Status: SHIPPED
Start: 2023-02-10 | End: 2023-03-12

## 2023-02-10 RX ADMIN — ASPIRIN 81 MG: 81 TABLET, CHEWABLE ORAL at 09:21

## 2023-02-10 RX ADMIN — AMLODIPINE BESYLATE 5 MG: 5 TABLET ORAL at 09:29

## 2023-02-10 RX ADMIN — LATANOPROST 1 DROP: 50 SOLUTION OPHTHALMIC at 22:40

## 2023-02-10 RX ADMIN — LACTULOSE 15 ML: 20 SOLUTION ORAL at 09:22

## 2023-02-10 RX ADMIN — Medication 2 UNITS: at 12:15

## 2023-02-10 RX ADMIN — GUAIFENESIN 600 MG: 600 TABLET, EXTENDED RELEASE ORAL at 09:22

## 2023-02-10 RX ADMIN — AMIODARONE HYDROCHLORIDE 100 MG: 200 TABLET ORAL at 09:20

## 2023-02-10 RX ADMIN — THERA TABS 1 TABLET: TAB at 09:23

## 2023-02-10 RX ADMIN — LEVOTHYROXINE SODIUM 75 MCG: 0.07 TABLET ORAL at 09:21

## 2023-02-10 RX ADMIN — ACETAMINOPHEN 650 MG: 650 SOLUTION ORAL at 09:27

## 2023-02-10 RX ADMIN — GUAIFENESIN 600 MG: 600 TABLET, EXTENDED RELEASE ORAL at 22:38

## 2023-02-10 RX ADMIN — SODIUM CHLORIDE, PRESERVATIVE FREE 10 ML: 5 INJECTION INTRAVENOUS at 22:39

## 2023-02-10 RX ADMIN — ATORVASTATIN CALCIUM 20 MG: 20 TABLET, FILM COATED ORAL at 22:39

## 2023-02-10 RX ADMIN — PANTOPRAZOLE SODIUM 40 MG: 40 TABLET, DELAYED RELEASE ORAL at 09:23

## 2023-02-10 RX ADMIN — TIMOLOL MALEATE 1 DROP: 5 SOLUTION OPHTHALMIC at 09:23

## 2023-02-10 NOTE — PROGRESS NOTES
End of Shift Note     Bedside shift change report given to PAN Lugo (oncoming nurse) by Anders Esposito (offgoing nurse). Report included the following information SBAR and Kardex     Shift worked: days   Shift summary and any significant changes:     Med x 1 with tylenol for chronic neck pain with relief         Concerns for physician to address:     Zone phone for oncoming shift:   [phone]      Patient Information  Bria Martínez  80 y.o.  2/3/2023  9:02 AM by Jose De Jesus Foreman MD. Bria Martínez was admitted from SNF Re[facility]     Problem List       Patient Active Problem List     Diagnosis Date Noted    History of hemorrhagic stroke with residual hemiplegia ([facility] [address] 02/04/2023    Hemiplegia of left nondominant side as late effect of cerebrovascular disease ([address] 02/04/2023    Stroke-like episode 02/03/2023           Past Medical History:   Diagnosis Date    Atrial fibrillation ([address]      CVA (cerebral vascular accident) ([address]      GERD (gastroesophageal reflux disease)      Glaucoma      HTN (hypertension)      Hyperlipidemia           Core Measures:  CVA: Yes Yes  CHF:No No  PNA:No No     Activity:  Activity Level: Bed Rest  Number times ambulated in hallways past shift: 0  Number of times OOB to chair past shift: 0     Cardiac:   Cardiac Monitoring: No      Cardiac Rhythm:      Access:   Current line(s): PIV   Central Line? No Placement date  Reason Medically Necessary   PICC LINE? No Placement date Reason Medically Necessary      Genitourinary:   Urinary status: voiding  Urinary Catheter?  No Placement Date  Reason Medically Necessary      Respiratory:   O2 Device: None (Room air)  Chronic home O2 use?: NO  Incentive spirometer at bedside: NO     GI:  Last Bowel Movement Date: 02/06/23  Current diet:  DIET ONE TIME MESSAGE  ADULT DIET Dysphagia - Pureed  Passing flatus: YES  Tolerating current diet: YES     Pain Management:   Patient states pain is manageable on current regimen: YES     Skin:  Mikey Score: 15  Interventions: increase time out of bed, PT/OT consult, and nutritional support     Patient Safety:  Fall Score:    Interventions: bed/chair alarm, assistive device (walker, cane, etc), gripper socks, and pt to call before getting OOB  @Rollbelt  @dexterity to release roll belt  Yes/No ( must document dexterity  here by stating Yes or No here, otherwise this is a restraint and must follow restraint documentation policy.)     DVT prophylaxis:  DVT prophylaxis Med- No  DVT prophylaxis SCD or JOSELO- No      Wounds: (If Applicable)  Wounds- No  Location      Active Consults:  IP CONSULT TO NEUROLOGY  IP CONSULT TO CARDIOLOGY     Length of Stay:  Expected LOS: 2d 21h  Actual LOS: 4  Discharge Plan: Yes Pending placement

## 2023-02-10 NOTE — DISCHARGE INSTRUCTIONS
HOSPITALIST DISCHARGE INSTRUCTIONS    NAME: Valdemar Pierre   :  1932   MRN:  [id number]     Date/Time:  2/10/2023 1:15 PM    ADMIT DATE: 2/3/2023   DISCHARGE DATE: 2/10/2023     Attending Physician: Nadeen Kendall MD    Acute encephalopathy/resolved  Hemorrhagic transformation of recent stroke  Rhino and enterovirus   Hypertension  Afib    Medications: Per above medication reconciliation.     Pain Management: per above medications    Recommended diet: Minced&Moist/Thin Liquids    Recommended activity: PT/OT Eval and Treat    Wound care: None    Indwelling devices:  None    Supplemental Oxygen: None    Required Lab work: Per SNF routine    Glucose management:  Accucheck ACHS with sliding scale per SNF protocol    Code status: Full

## 2023-02-10 NOTE — PROGRESS NOTES
End of Shift Note     Bedside shift change report given to  Marah Nguyễn RN (oncoming nurse) by Yemi Montano RN (offgoing nurse). Report included the following information SBAR and Kardex     Shift worked: 7a-7p   Shift summary and any significant changes:     No significant changes. Patient turned approx. Q2 hours         Concerns for physician to address:  None   Zone phone for oncoming shift:   [phone]      Patient Information  Sandhya Castellanos  80 y.o.  2/3/2023  9:02 AM by Marilynn Rhodes MD. Sandhya Castellanos was admitted from SNF Re[facility]     Problem List          Patient Active Problem List     Diagnosis Date Noted    History of hemorrhagic stroke with residual hemiplegia ([facility] [address] 02/04/2023    Hemiplegia of left nondominant side as late effect of cerebrovascular disease ([facility] [address] 02/04/2023    Stroke-like episode 02/03/2023              Past Medical History:   Diagnosis Date    Atrial fibrillation (HCC)      CVA (cerebral vascular accident) ([facility] [address]      GERD (gastroesophageal reflux disease)      Glaucoma      HTN (hypertension)      Hyperlipidemia           Core Measures:  CVA: Yes Yes  CHF:No No  PNA:No No     Activity:  Activity Level: Bed Rest  Number times ambulated in hallways past shift: 0  Number of times OOB to chair past shift: 0     Cardiac:   Cardiac Monitoring: No      Cardiac Rhythm:      Access:   Current line(s): PIV   Central Line? No Placement date  Reason Medically Necessary   PICC LINE? No Placement date Reason Medically Necessary      Genitourinary:   Urinary status: voiding  Urinary Catheter?  No Placement Date  Reason Medically Necessary      Respiratory:   O2 Device: None (Room air)  Chronic home O2 use?: NO  Incentive spirometer at bedside: NO     GI:  Last Bowel Movement Date: 02/06/23  Current diet:  DIET ONE TIME MESSAGE  ADULT DIET Dysphagia - Pureed  Passing flatus: YES  Tolerating current diet: YES     Pain Management:   Patient states pain is manageable on current regimen: YES Skin:  Mikey Score: 15  Interventions: increase time out of bed, PT/OT consult, and nutritional support     Patient Safety:  Fall Score:    Interventions: bed/chair alarm, assistive device (walker, cane, etc), gripper socks, and pt to call before getting OOB       DVT prophylaxis:  DVT prophylaxis Med- No  DVT prophylaxis SCD or JOSELO- No      Wounds: (If Applicable)  Wounds- No  Location      Active Consults:  IP CONSULT TO NEUROLOGY  IP CONSULT TO CARDIOLOGY     Length of Stay:  Expected LOS: 2d 21h  Actual LOS: 7  Discharge Plan: Yes Pending placement

## 2023-02-10 NOTE — PROGRESS NOTES
End of Shift Note     Bedside shift change report given to  Shawn Boswell RN (oncoming nurse) by Radhames Berg (offgoing nurse). Report included the following information SBAR and Kardex     Shift worked: Nights   Shift summary and any significant changes:       Waiting for placement, Turned every 2 hours         Concerns for physician to address:  none   Zone phone for oncoming shift:  [phone]      Patient Information  Christine Talavera  80 y.o.  2/3/2023  9:02 AM by Remberto Terrazas MD. Christine Talavera was admitted from SNF Re[facility]     Problem List       Patient Active Problem List     Diagnosis Date Noted    History of hemorrhagic stroke with residual hemiplegia ([facility] [address] 02/04/2023    Hemiplegia of left nondominant side as late effect of cerebrovascular disease ([facility] [address] 02/04/2023    Stroke-like episode 02/03/2023           Past Medical History:   Diagnosis Date    Atrial fibrillation ([facility] [address]      CVA (cerebral vascular accident) ([facility] [address]      GERD (gastroesophageal reflux disease)      Glaucoma      HTN (hypertension)      Hyperlipidemia           Core Measures:  CVA: Yes Yes  CHF:No No  PNA:No No     Activity:  Activity Level: Bed Rest  Number times ambulated in hallways past shift: 0  Number of times OOB to chair past shift: 0     Cardiac:   Cardiac Monitoring: No      Cardiac Rhythm: Sinus Fatuma Moat     Access:   Current line(s): PIV   Central Line? No Placement date  Reason Medically Necessary   PICC LINE? No Placement date Reason Medically Necessary      Genitourinary:   Urinary status: voiding  Urinary Catheter?  No Placement Date  Reason Medically Necessary      Respiratory:   O2 Device: None (Room air)  Chronic home O2 use?: NO  Incentive spirometer at bedside: NO     GI:  Last Bowel Movement Date: 02/06/23  Current diet:  DIET ONE TIME MESSAGE  ADULT DIET Dysphagia - Pureed  Passing flatus: YES  Tolerating current diet: YES     Pain Management:   Patient states pain is manageable on current regimen: YES     Skin:  Mikey Score: 15  Interventions: increase time out of bed, PT/OT consult, and nutritional support     Patient Safety:  Fall Score:    Interventions: bed/chair alarm, assistive device (walker, cane, etc), gripper socks, and pt to call before getting OOB  @Rollbelt  @dexterity to release roll belt  Yes/No ( must document dexterity  here by stating Yes or No here, otherwise this is a restraint and must follow restraint documentation policy.)     DVT prophylaxis:  DVT prophylaxis Med- No  DVT prophylaxis SCD or JOSELO- No      Wounds: (If Applicable)  Wounds- No  Location      Active Consults:  IP CONSULT TO NEUROLOGY  IP CONSULT TO CARDIOLOGY     Length of Stay:  Expected LOS: 2d 21h  Actual LOS: 4  Discharge Plan: Yes Pending placement

## 2023-02-10 NOTE — PROGRESS NOTES
Hospitalist Progress Note    Subjective:   Daily Progress Note: 2/10/2023 2:24 PM    Hospital Course:  Valdemar Pierre is a 80 y.o.  female who presents with PMH of HTN, afib, CVA, DM, glaucoma, hypothyroidism, and GERD. Patient presenting from [facility] after episode of AMS this morning. Reportedly she fell from bed level yesterday and did not hit her head. Yesterday post fall she was fine at new baseline of bed bound with some dysarthria, however able to talk. This morning staff noted that she was altered by finding that she only responded to pain. Per son when he presented to ED, patient was back to her new baseline post CVA. Prior to stroke 3 weeks ago, she was living independently without a assisted walking device. She was driving and managing her ADLs independently with some short term memory difficulties. Since 1/2023 CVA she has been in rehab with left sided hemiplegia and left sided facial droop. MRI brain shows subacute hemorrhage associated with subacute infarct in right corona radiata/basal ganglia. Subjective:  -No events overnight. Discussed with the nurse    Assessment / Plan:  Acute encephalopathy:  Hemorrhagic transformation of recent stroke    MRI brain shows subacute hemorrhage associated with subacute infarct in right corona radiata/basal ganglia. Repeat CT head 2/5 stable hemorrhage  Repeat CT head in 1-2 week as outpatient. Neurology recommends to be off for xarelto for 4-6 weeks. Has elevated ammonia level- started on lactulose. Decrease to twice daily      Cough:  Rhino and enterovirus +  Mucinex    Hypertension  Afib:  Hold xarelto in the setting of subacute hemorrhage  Hold  losartan, metoprolol  Resume amlodipine  Started on amiodarone at lower dose 100 mg po daily due to bradycardia  Appreciate cardiology consult  Outpatient cardiology follow up for watchman evaluation.      Diabetes mellitus:  Continue sliding scale    Glaucoma:  Continue eye drops        18.5 - 24.9 Normal weight / Body mass index is 20.34 kg/m². Code status: Full  Prophylaxis: SCD's  Recommended Disposition: LTC    -SNF denied, did P2P but still refused.  Can go to LTC/Home with 24 x7 assist       Current Facility-Administered Medications   Medication Dose Route Frequency    lactulose (CHRONULAC) 10 gram/15 mL solution 15 mL  10 g Oral BID    atorvastatin (LIPITOR) tablet 20 mg  20 mg Oral QHS    amiodarone (CORDARONE) tablet 100 mg  100 mg Oral DAILY    melatonin tablet 3 mg  3 mg Oral QHS PRN    sodium chloride (NS) flush 5-10 mL  5-10 mL IntraVENous PRN    aspirin chewable tablet 81 mg  81 mg Oral DAILY    latanoprost (XALATAN) 0.005 % ophthalmic solution 1 Drop  1 Drop Both Eyes QHS    levothyroxine (SYNTHROID) tablet 75 mcg  75 mcg Oral ACB    [Held by provider] losartan (COZAAR) tablet 100 mg  100 mg Oral DAILY    amLODIPine (NORVASC) tablet 5 mg  5 mg Oral DAILY    timolol (TIMOPTIC) 0.5 % ophthalmic solution 1 Drop  1 Drop Both Eyes BID    pantoprazole (PROTONIX) tablet 40 mg  40 mg Oral ACB    [Held by provider] rivaroxaban (XARELTO) tablet 15 mg  15 mg Oral DAILY    therapeutic multivitamin (THERAGRAN) tablet 1 Tablet  1 Tablet Oral DAILY    acetaminophen (TYLENOL) tablet 650 mg  650 mg Oral Q4H PRN    Or    acetaminophen (TYLENOL) solution 650 mg  650 mg Per NG tube Q4H PRN    Or    acetaminophen (TYLENOL) suppository 650 mg  650 mg Rectal Q4H PRN    magnesium hydroxide (MILK OF MAGNESIA) 400 mg/5 mL oral suspension 30 mL  30 mL Oral DAILY PRN    guaiFENesin ER (MUCINEX) tablet 600 mg  600 mg Oral Q12H    benzonatate (TESSALON) capsule 100 mg  100 mg Oral TID PRN    acetaminophen (TYLENOL) tablet 500 mg  500 mg Oral Q6H PRN    insulin lispro (HUMALOG) injection   SubCUTAneous AC&HS    glucose chewable tablet 16 g  4 Tablet Oral PRN    glucagon (GLUCAGEN) injection 1 mg  1 mg IntraMUSCular PRN    dextrose 10% infusion 0-250 mL  0-250 mL IntraVENous PRN        Review of Systems  Constitutional: No fevers, No chills, No sweats, No fatigue, No Weakness  Eyes: No redness  Ears, nose, mouth, throat, and face: No nasal congestion, No sore throat, No voice change  Respiratory: No Shortness of Breath, No cough, No wheezing  Cardiovascular: No chest pain, No palpitations, No extremity edema  Gastrointestinal: No nausea, No vomiting, No diarrhea, No abdominal pain  Genitourinary: No frequency, No dysuria, No hematuria  Integument/breast: No skin lesion(s)   Neurological: has mild Confusion, No headaches, No dizziness      Objective:     Visit Vitals  BP (!) 128/58   Pulse 66   Temp 98.4 °F (36.9 °C)   Resp 20   Ht 5' 8\" (1.727 m)   Wt 60.7 kg (133 lb 12.8 oz)   SpO2 98%   BMI 20.34 kg/m²      O2 Device: None (Room air)    Temp (24hrs), Av.9 °F (36.6 °C), Min:97.5 °F (36.4 °C), Max:98.4 °F (36.9 °C)      No intake/output data recorded.  1901 - 02/10 0700  In: -   Out: 2     PHYSICAL EXAM:  Constitutional: No acute distress  Skin: Extremities and face reveal no rashes. HEENT: Sclerae anicteric. Extra-occular muscles are intact. No oral ulcers. The neck is supple and no masses. Cardiovascular: Regular rate and rhythm. Respiratory:  Clear breath sounds bilaterally with no wheezes, rales, or rhonchi. GI: Abdomen nondistended, soft, and nontender. Normal active bowel sounds. Musculoskeletal: No pitting edema of the lower legs. Able to move all ext  Neurological:  Patient was sleepy this morning.    Psychiatric: Mood appears appropriate       Data Review    Recent Results (from the past 24 hour(s))   GLUCOSE, POC    Collection Time: 23 11:41 AM   Result Value Ref Range    Glucose (POC) 159 (H) 65 - 117 mg/dL    Performed by [employer] PCT    GLUCOSE, POC    Collection Time: 23  4:29 PM   Result Value Ref Range    Glucose (POC) 137 (H) 65 - 117 mg/dL    Performed by [employer] PCT    GLUCOSE, POC    Collection Time: 23  8:36 PM   Result Value Ref Range    Glucose (POC) 113 65 - 117 mg/dL    Performed by [facility]    GLUCOSE, POC    Collection Time: 02/09/23  9:34 PM   Result Value Ref Range    Glucose (POC) 151 (H) 65 - 117 mg/dL    Performed by Gayland Angelucci PCT    GLUCOSE, POC    Collection Time: 02/10/23  6:18 AM   Result Value Ref Range    Glucose (POC) 107 65 - 117 mg/dL    Performed by Gayland Angelucci PCT                  Time spent 35 minutes involving direct patient care as well as reviewing patient's labs and coordination of care with nursing staff     Care Plan discussed with: Patient/Family/RN/Case Management        Total time spent with patient: 35 minutes.

## 2023-02-10 NOTE — PROGRESS NOTES
CM received a Medicare appeal from [facility]. Medical records including IMM, Detailed Noitc of discharge, Ezequiel Rodriguez and all other medical information requested sent for review.     Robin Nuñez RN, BSN, [address] [place] [address]  Manager of Case Management  [phone]

## 2023-02-10 NOTE — DISCHARGE SUMMARY
Hospitalist Discharge Summary     Patient ID:  Antonio Thurston  [id number]  80 y.o.  6/14/1932  2/3/2023    PCP on record: None    Admit date: 2/3/2023  Discharge date and time: 2/10/2023    DISCHARGE DIAGNOSIS:    Acute encephalopathy:  Hemorrhagic transformation of recent stroke  Rhino and enterovirus   Hypertension  Afib:         CONSULTATIONS:  IP CONSULT TO NEUROLOGY  IP CONSULT TO PALLIATIVE CARE - PROVIDER  IP CONSULT TO CARDIOLOGY    Excerpted HPI from H&P of Osbaldo Hernandez MD:  Antonio Thurston is a 80 y.o.  female who presents with PMH of HTN, afib, CVA, DM, glaucoma, hypothyroidism, and GERD. Patient poor historian, alert and oriented to self and very nearly date with month, day, and \"2003\". Unaware of location. History collected from chart and son Efraín Min. Patient presenting from [facility] after episode of AMS this morning. Reportedly she fell from bed level yesterday and did not hit her head. Yesterday post fall she was fine at new baseline of bed bound with some dysarthria, however able to talk. This morning staff noted that she was altered by finding that she only responded to pain. Per son when he presented to ED, patient was back to her new baseline post CVA. Prior to stroke 3 weeks ago, she was living independently without a assisted walking device. She was driving and managing her ADLs independently with some short term memory difficulties. Since 1/2023 CVA she has been in rehab with left sided hemiplegia and left sided facial droop. Pt is noted to have some congestion and cough during interview. Rapid COVID and flu test negative.    ______________________________________________________________________  DISCHARGE SUMMARY/HOSPITAL COURSE:  for full details see H&P, daily progress notes, labs, consult notes. Antonio Thurston is a 80 y.o.  female who presents with PMH of HTN, afib, CVA, DM, glaucoma, hypothyroidism, and GERD.   Patient presenting from [facility] after episode of AMS . Reportedly she fell from bed level yesterday and did not hit her head. In the ED patient found to have hemorrhagic conversion of her recent right basal ganglia stroke. She had left-sided weakness from prior stroke and was sent to rehab. Now due to hemorrhagic conversion it was dense hemiplegia left. CT was repeated, which showed stable hemorrhage. Patient was on Xarelto which on hold since admission. Patient had a poor participation with PT and OT, they recommended long-term care placement, with poor candidate for rehabilitation. SNF was tried initially, but was declined by [facility], also did peer to peer which was declined as well. Patient has been stable for long-term care placement/home discharge with family support. Other medical problems and plan  Acute encephalopathy:  Hemorrhagic transformation of recent stroke     MRI brain shows subacute hemorrhage associated with subacute infarct in right corona radiata/basal ganglia. Repeat CT head 2/5 stable hemorrhage  Repeat CT head in 1-2 week as outpatient. Neurology recommends to be off for xarelto for 4-6 weeks. Has elevated ammonia level- started on lactulose. Decrease to twice daily        Cough:  Rhino and enterovirus +  Mucinex     Hypertension  Afib:  Hold xarelto in the setting of subacute hemorrhage  Hold  losartan, metoprolol  Resume amlodipine  Started on amiodarone at lower dose 100 mg po daily due to bradycardia  Appreciate cardiology consult  Outpatient cardiology follow up for watchman evaluation. Diabetes mellitus:  Continue sliding scale     Glaucoma:  Continue eye drops       _______________________________________________________________________  Patient seen and examined by me on discharge day. Pertinent Findings:  Gen:    Not in distress  Chest: Clear lungs  CVS:   Regular rhythm.   No edema  Abd:  Soft, not distended, not tender  Neuro:  Alert, dense left-sided paralysis  _______________________________________________________________________  DISCHARGE MEDICATIONS:   Current Discharge Medication List        START taking these medications    Details   lactulose (CHRONULAC) 10 gram/15 mL solution Take 15 mL by mouth daily (with dinner) for 30 days. Qty: 450 mL, Refills: 0  Start date: 2/10/2023, End date: 3/12/2023      atorvastatin (LIPITOR) 20 mg tablet Take 1 Tablet by mouth nightly for 30 days. Qty: 30 Tablet, Refills: 0  Start date: 2/10/2023, End date: 3/12/2023           CONTINUE these medications which have CHANGED    Details   amiodarone (CORDARONE) 200 mg tablet Take 0.5 Tablets by mouth daily for 30 days. Qty: 15 Tablet, Refills: 0  Start date: 2/10/2023, End date: 3/12/2023           CONTINUE these medications which have NOT CHANGED    Details   acetaminophen (Tylenol 8 Hour) 650 mg TbER Take 650 mg by mouth every eight (8) hours. aspirin 81 mg chewable tablet Take 81 mg by mouth daily. latanoprost (XALATAN) 0.005 % ophthalmic solution Administer 1 Drop to both eyes nightly. levothyroxine (SYNTHROID) 75 mcg tablet Take 75 mcg by mouth Daily (before breakfast). multivitamin (ONE A DAY) tablet Take 1 Tablet by mouth daily. amLODIPine (Norvasc) 5 mg tablet Take 5 mg by mouth daily. Omeprazole delayed release (PRILOSEC D/R) 20 mg tablet Take 20 mg by mouth daily. timolol (TIMOPTIC) 0.5 % ophthalmic solution 1 Drop two (2) times a day. STOP taking these medications       losartan (COZAAR) 100 mg tablet Comments:   Reason for Stopping:         metoprolol tartrate (LOPRESSOR) 25 mg tablet Comments:   Reason for Stopping:         rivaroxaban (Xarelto) 20 mg tab tablet Comments:   Reason for Stopping:                 Patient Follow Up Instructions:    Activity: PT/OT Eval and Treat  Diet: Dysphagia diet-pureed  Wound Care: None needed        Follow-up Information       Follow up With Specialties Details Why Contact Info None    None ([phone]) Patient stated that they have no PCP      Asha Ortiz MD Cardiovascular Disease Physician Follow up in 1 week(s)      [address]  [facility]  [phone]          ________________________________________________________________    Risk of deterioration: Moderate    Condition at Discharge:  Stable  __________________________________________________________________    Disposition  SNF/LTC    ____________________________________________________________________    Code Status: Full Code  ___________________________________________________________________      Total time in minutes spent coordinating this discharge (includes going over instructions, follow-up, prescriptions, and preparing report for sign off to her PCP) :  35 minutes    Signed:  Hever Blanco MD

## 2023-02-10 NOTE — PROGRESS NOTES
Transition of Care Plan:     RUR: 13%  Disposition: [employer] denied SNF placement; LTC pvt pay  Follow up appointments: Pt does not have a PCP  DME needed: None  Transportation at Discharge: Pt will need medical transport at d/c.   Willy Avers or means to access home:   Pt has access     IM Medicare Letter: 2nd IM Letter needed at d/c; 1st IM provided 2/5   Is patient a [place] and connected with the [address] [facility]? No              If yes, was [place] transfer form completed and [facility] notified? Caregiver Contact: Pita Quezada [phone]  Discharge Caregiver contacted prior to discharge? CM will notify caregiver at d/c. Care Conference needed?: No    12:18 p.m. CM received a call back from Mr. Aung Vargas who stated [facility] and [place] will not work for him. He requested referrals be sent to [facility] [address], [address] [facility],  [address]. 10:47 a.m. CM received a call from [address] [facility] who stated they have a bed for pt today. The rate is $334.00 a night and they will require 30 days up front. CM called Mr. Aung Vargas and provided information. CM also provided information for [facility] as they expressed interest but needed to speak to him about finances. Mr. Aung Vargas reported he will make some phone calls and get back to this  later today. CM checked referrals. [facility] is reaching out to Mr. Aung Vargas to discuss financial arrangements. JJ will continue to follow.      Stephanie Ventura LMSW  Supervisee in [facility]

## 2023-02-10 NOTE — PROGRESS NOTES
Comprehensive Nutrition Assessment    Type and Reason for Visit: Initial, RD nutrition re-screen/LOS    Nutrition Recommendations/Plan:   Continue current diet  Add ensure plus, magic cups, and ensure compact daily      Malnutrition Assessment:  Malnutrition Status:  Severe malnutrition (02/10/23 1111)    Context:  Acute illness     Findings of the 6 clinical characteristics of malnutrition:   Energy Intake:  50% or less of est energy requirements for 5 or more days  Weight Loss:  Unable to assess    Body Fat Loss:  Mild body fat loss, Buccal region, Triceps   Muscle Mass Loss: Moderate muscle mass loss, Clavicles (pectoralis & deltoids), Hand (interosseous), Temples (temporalis)  Fluid Accumulation:  No significant fluid accumulation,     Strength:  Not performed     Nutrition Assessment:    Patient medically noted for acute encephalopathy, hemorrhagic transformation of stroke, and rhinovirus. PMH recent stroke, HTN, AFIB, DM, and GERD. Chart reviewed for length of stay. Poor PO intake per flowsheets and progress notes. Patient stated she thought she had been eating fairly well; PO mostly 1-25% per flowsheets. No weight history noted on chart review but patient thought her UBW was ~130#. Will add a variety of supplements for patient to try. Encourage intake of meals. CM working on placement.      Patient Vitals for the past 168 hrs:   % Diet Eaten   02/09/23 1529 26 - 50%   02/09/23 1101 1 - 25%   02/07/23 1823 1 - 25%   02/07/23 1154 1 - 25%   02/07/23 0754 1 - 25%   02/04/23 1730 1 - 25%     Nutrition Related Findings:    [phone]   BM 2/6   Norvasc, Atorvastatin, humalog, Lactulose, Synthroid, Protonix, MVI   Wound Type: None    Current Nutrition Intake & Therapies:  Average Meal Intake: 1-25%  Average Supplement Intake: None ordered  DIET ONE TIME MESSAGE  ADULT DIET Dysphagia - Minced & Moist    Anthropometric Measures:  Height: 5' 8\" (172.7 cm)  Ideal Body Weight (IBW): 140 lbs (64 kg) Current Body Wt:  60.7 kg (133 lb 13.1 oz), 95.6 % IBW. Current BMI (kg/m2): 20.4                          BMI Category: Normal weight (BMI 18.5-24. 9)    Estimated Daily Nutrient Needs:  Energy Requirements Based On: Formula  Weight Used for Energy Requirements: Current  Energy (kcal/day): 1400 kcals (BMR x 1. 3AF)  Weight Used for Protein Requirements: Current  Protein (g/day): 61-73g (1.0-1.2 g/kg bw)  Method Used for Fluid Requirements: 1 ml/kcal  Fluid (ml/day): 1400 mL    Nutrition Diagnosis:   Inadequate protein-energy intake related to cognitive or neurological impairment as evidenced by intake 0-25%    Nutrition Interventions:   Food and/or Nutrient Delivery: Continue current diet, Start oral nutrition supplement  Nutrition Education/Counseling: No recommendations at this time  Coordination of Nutrition Care: Continue to monitor while inpatient       Goals:     Goals: by next RD assessment, PO intake 50% or greater       Nutrition Monitoring and Evaluation:   Behavioral-Environmental Outcomes: None identified  Food/Nutrient Intake Outcomes: Food and nutrient intake, Supplement intake  Physical Signs/Symptoms Outcomes: Biochemical data, Weight    Discharge Planning:    Continue current diet, Continue oral nutrition supplement    Sung Monae RD  Contact: ext [phone]

## 2023-02-10 NOTE — PROGRESS NOTES
Occupational Therapy    Chart reviewed for updates and noted patient has been denied for SNF rehab due to having poor rehab potential and not progressing with PT and OT. The plan is now for her to discharge to LTC. As of last OT session she was total A for bed mobility and at time of OT evaluation on 2/4 she was min A for grooming and self feeding, and was max to total assist for all other ADL. She is greatly limited in her ability to progress with OT due to having dense L hemiplegia, with impaired cognition, poor insight into her deficits and L neglect. At this time she will be discharged from OT services 2/2 the above. Will complete OT order.      Modesto Dunn, OTR/L

## 2023-02-10 NOTE — PROGRESS NOTES
Problem: TIA/CVA Stroke: Day 2 Until Discharge  Goal: Activity/Safety  Outcome: Progressing Towards Goal  Goal: Diagnostic Test/Procedures  Outcome: Progressing Towards Goal  Goal: Nutrition/Diet  Outcome: Progressing Towards Goal  Goal: Discharge Planning  Outcome: Progressing Towards Goal  Goal: Medications  Outcome: Progressing Towards Goal  Goal: Respiratory  Outcome: Progressing Towards Goal  Goal: Treatments/Interventions/Procedures  Outcome: Progressing Towards Goal  Goal: Psychosocial  Outcome: Progressing Towards Goal  Goal: *Verbalizes anxiety and depression are reduced or absent  Outcome: Progressing Towards Goal  Goal: *Absence of aspiration  Outcome: Progressing Towards Goal  Goal: *Absence of deep venous thrombosis signs and symptoms(Stroke Metric)  Outcome: Progressing Towards Goal  Goal: *Optimal pain control at patient's stated goal  Outcome: Progressing Towards Goal  Goal: *Tolerating diet  Outcome: Progressing Towards Goal  Goal: *Ability to perform ADLs and demonstrates progressive mobility and function  Outcome: Progressing Towards Goal  Goal: *Stroke education continued(Stroke Metric)  Outcome: Progressing Towards Goal     Problem: Falls - Risk of  Goal: *Absence of Falls  Description: Document Aylin Fall Risk and appropriate interventions in the flowsheet. Outcome: Progressing Towards Goal  Note: Fall Risk Interventions:            Medication Interventions: Bed/chair exit alarm, Patient to call before getting OOB    Elimination Interventions: Call light in reach, Bed/chair exit alarm              Problem: Risk for Spread of Infection  Goal: Prevent transmission of infectious organism to others  Description: Prevent the transmission of infectious organisms to other patients, staff members, and visitors.   Outcome: Progressing Towards Goal

## 2023-02-10 NOTE — PROGRESS NOTES
Problem: Patient Education: Go to Patient Education Activity  Goal: Patient/Family Education  Outcome: Progressing Towards Goal     Problem: TIA/CVA Stroke: 0-24 hours  Goal: Off Pathway (Use only if patient is Off Pathway)  Outcome: Progressing Towards Goal  Goal: Activity/Safety  Outcome: Progressing Towards Goal  Goal: Consults, if ordered  Outcome: Progressing Towards Goal  Goal: Diagnostic Test/Procedures  Outcome: Progressing Towards Goal  Goal: Nutrition/Diet  Outcome: Progressing Towards Goal  Goal: Discharge Planning  Outcome: Progressing Towards Goal  Goal: Medications  Outcome: Progressing Towards Goal  Goal: Respiratory  Outcome: Progressing Towards Goal  Goal: Treatments/Interventions/Procedures  Outcome: Progressing Towards Goal  Goal: Minimize risk of bleeding post-thrombolytic infusion  Outcome: Progressing Towards Goal  Goal: Monitor for complications post-thrombolytic infusion  Outcome: Progressing Towards Goal  Goal: Psychosocial  Outcome: Progressing Towards Goal  Goal: *Hemodynamically stable  Outcome: Progressing Towards Goal  Goal: *Neurologically stable  Description: Absence of additional neurological deficits    Outcome: Progressing Towards Goal  Goal: *Verbalizes anxiety and depression are reduced or absent  Outcome: Progressing Towards Goal  Goal: *Absence of Signs of Aspiration on Current Diet  Outcome: Progressing Towards Goal  Goal: *Absence of deep venous thrombosis signs and symptoms(Stroke Metric)  Outcome: Progressing Towards Goal  Goal: *Ability to perform ADLs and demonstrates progressive mobility and function  Outcome: Progressing Towards Goal  Goal: *Stroke education started(Stroke Metric)  Outcome: Progressing Towards Goal  Goal: *Dysphagia screen performed(Stroke Metric)  Outcome: Progressing Towards Goal  Goal: *Rehab consulted(Stroke Metric)  Outcome: Progressing Towards Goal     Problem: TIA/CVA Stroke: Day 2 Until Discharge  Goal: Off Pathway (Use only if patient is Off Pathway)  Outcome: Progressing Towards Goal  Goal: Activity/Safety  Outcome: Progressing Towards Goal  Goal: Diagnostic Test/Procedures  Outcome: Progressing Towards Goal  Goal: Nutrition/Diet  Outcome: Progressing Towards Goal  Goal: Discharge Planning  Outcome: Progressing Towards Goal  Goal: Medications  Outcome: Progressing Towards Goal  Goal: Respiratory  Outcome: Progressing Towards Goal  Goal: Treatments/Interventions/Procedures  Outcome: Progressing Towards Goal  Goal: Psychosocial  Outcome: Progressing Towards Goal  Goal: *Verbalizes anxiety and depression are reduced or absent  Outcome: Progressing Towards Goal  Goal: *Absence of aspiration  Outcome: Progressing Towards Goal  Goal: *Absence of deep venous thrombosis signs and symptoms(Stroke Metric)  Outcome: Progressing Towards Goal  Goal: *Optimal pain control at patient's stated goal  Outcome: Progressing Towards Goal  Goal: *Tolerating diet  Outcome: Progressing Towards Goal  Goal: *Ability to perform ADLs and demonstrates progressive mobility and function  Outcome: Progressing Towards Goal  Goal: *Stroke education continued(Stroke Metric)  Outcome: Progressing Towards Goal     Problem: Ischemic Stroke: Discharge Outcomes  Goal: *Verbalizes anxiety and depression are reduced or absent  Outcome: Progressing Towards Goal  Goal: *Verbalize understanding of risk factor modification(Stroke Metric)  Outcome: Progressing Towards Goal  Goal: *Hemodynamically stable  Outcome: Progressing Towards Goal  Goal: *Absence of aspiration pneumonia  Outcome: Progressing Towards Goal  Goal: *Aware of needed dietary changes  Outcome: Progressing Towards Goal  Goal: *Verbalize understanding of prescribed medications including anti-coagulants, anti-lipid, and/or anti-platelets(Stroke Metric)  Outcome: Progressing Towards Goal  Goal: *Tolerating diet  Outcome: Progressing Towards Goal  Goal: *Aware of follow-up diagnostics related to anticoagulants  Outcome: Progressing Towards Goal  Goal: *Ability to perform ADLs and demonstrates progressive mobility and function  Outcome: Progressing Towards Goal  Goal: *Absence of DVT(Stroke Metric)  Outcome: Progressing Towards Goal  Goal: *Absence of aspiration  Outcome: Progressing Towards Goal  Goal: *Optimal pain control at patient's stated goal  Outcome: Progressing Towards Goal  Goal: *Home safety concerns addressed  Outcome: Progressing Towards Goal  Goal: *Describes available resources and support systems  Outcome: Progressing Towards Goal  Goal: *Verbalizes understanding of activation of EMS(911) for stroke symptoms(Stroke Metric)  Outcome: Progressing Towards Goal  Goal: *Understands and describes signs and symptoms to report to providers(Stroke Metric)  Outcome: Progressing Towards Goal  Goal: *Neurolgocially stable (absence of additional neurological deficits)  Outcome: Progressing Towards Goal  Goal: *Verbalizes importance of follow-up with primary care physician(Stroke Metric)  Outcome: Progressing Towards Goal  Goal: *Smoking cessation discussed,if applicable(Stroke Metric)  Outcome: Progressing Towards Goal  Goal: *Depression screening completed(Stroke Metric)  Outcome: Progressing Towards Goal     Problem: Pain  Goal: *Control of Pain  Outcome: Progressing Towards Goal  Goal: *PALLIATIVE CARE:  Alleviation of Pain  Outcome: Progressing Towards Goal     Problem: Patient Education: Go to Patient Education Activity  Goal: Patient/Family Education  Outcome: Progressing Towards Goal     Problem: Falls - Risk of  Goal: *Absence of Falls  Description: Document Aylin Fall Risk and appropriate interventions in the flowsheet.   Outcome: Progressing Towards Goal  Note: Fall Risk Interventions:            Medication Interventions: Bed/chair exit alarm, Patient to call before getting OOB    Elimination Interventions: Call light in reach, Bed/chair exit alarm              Problem: Patient Education: Go to Patient Education Activity  Goal: Patient/Family Education  Outcome: Progressing Towards Goal     Problem: Risk for Spread of Infection  Goal: Prevent transmission of infectious organism to others  Description: Prevent the transmission of infectious organisms to other patients, staff members, and visitors. Outcome: Progressing Towards Goal     Problem: Patient Education:  Go to Education Activity  Goal: Patient/Family Education  Outcome: Progressing Towards Goal     Problem: Patient Education: Go to Patient Education Activity  Goal: Patient/Family Education  Outcome: Progressing Towards Goal     Problem: Patient Education: Go to Patient Education Activity  Goal: Patient/Family Education  Outcome: Progressing Towards Goal     Problem: Pressure Injury - Risk of  Goal: *Prevention of pressure injury  Description: Document Mikey Scale and appropriate interventions in the flowsheet. Outcome: Progressing Towards Goal  Note: Pressure Injury Interventions:  Sensory Interventions: Assess changes in LOC, Turn and reposition approx. every two hours (pillows and wedges if needed)    Moisture Interventions: Apply protective barrier, creams and emollients, Check for incontinence Q2 hours and as needed    Activity Interventions: Assess need for specialty bed    Mobility Interventions: HOB 30 degrees or less, Turn and reposition approx.  every two hours(pillow and wedges)    Nutrition Interventions: Document food/fluid/supplement intake    Friction and Shear Interventions: Apply protective barrier, creams and emollients, HOB 30 degrees or less                Problem: Patient Education: Go to Patient Education Activity  Goal: Patient/Family Education  Outcome: Progressing Towards Goal     Problem: Patient Education: Go to Patient Education Activity  Goal: Patient/Family Education  Outcome: Progressing Towards Goal     Problem: Patient Education: Go to Patient Education Activity  Goal: Patient/Family Education  Outcome: Progressing Towards Goal

## 2023-02-11 LAB
GLUCOSE BLD STRIP.AUTO-MCNC: 111 MG/DL (ref 65–117)
GLUCOSE BLD STRIP.AUTO-MCNC: 113 MG/DL (ref 65–117)
GLUCOSE BLD STRIP.AUTO-MCNC: 127 MG/DL (ref 65–117)
GLUCOSE BLD STRIP.AUTO-MCNC: 131 MG/DL (ref 65–117)
SERVICE CMNT-IMP: ABNORMAL
SERVICE CMNT-IMP: ABNORMAL
SERVICE CMNT-IMP: NORMAL
SERVICE CMNT-IMP: NORMAL

## 2023-02-11 PROCEDURE — 82962 GLUCOSE BLOOD TEST: CPT

## 2023-02-11 PROCEDURE — 74011250637 HC RX REV CODE- 250/637: Performed by: STUDENT IN AN ORGANIZED HEALTH CARE EDUCATION/TRAINING PROGRAM

## 2023-02-11 PROCEDURE — 74011250637 HC RX REV CODE- 250/637: Performed by: INTERNAL MEDICINE

## 2023-02-11 PROCEDURE — 51798 US URINE CAPACITY MEASURE: CPT

## 2023-02-11 PROCEDURE — 65270000046 HC RM TELEMETRY

## 2023-02-11 RX ADMIN — TIMOLOL MALEATE 1 DROP: 5 SOLUTION OPHTHALMIC at 09:32

## 2023-02-11 RX ADMIN — THERA TABS 1 TABLET: TAB at 09:31

## 2023-02-11 RX ADMIN — LACTULOSE 15 ML: 20 SOLUTION ORAL at 17:38

## 2023-02-11 RX ADMIN — GUAIFENESIN 600 MG: 600 TABLET, EXTENDED RELEASE ORAL at 09:30

## 2023-02-11 RX ADMIN — LEVOTHYROXINE SODIUM 75 MCG: 0.07 TABLET ORAL at 06:39

## 2023-02-11 RX ADMIN — GUAIFENESIN 600 MG: 600 TABLET, EXTENDED RELEASE ORAL at 21:34

## 2023-02-11 RX ADMIN — ACETAMINOPHEN 325MG 650 MG: 325 TABLET ORAL at 09:31

## 2023-02-11 RX ADMIN — TIMOLOL MALEATE 1 DROP: 5 SOLUTION OPHTHALMIC at 17:40

## 2023-02-11 RX ADMIN — ASPIRIN 81 MG: 81 TABLET, CHEWABLE ORAL at 09:31

## 2023-02-11 RX ADMIN — AMIODARONE HYDROCHLORIDE 100 MG: 200 TABLET ORAL at 09:30

## 2023-02-11 RX ADMIN — AMLODIPINE BESYLATE 5 MG: 5 TABLET ORAL at 09:31

## 2023-02-11 RX ADMIN — ATORVASTATIN CALCIUM 20 MG: 20 TABLET, FILM COATED ORAL at 21:34

## 2023-02-11 RX ADMIN — LACTULOSE 15 ML: 20 SOLUTION ORAL at 09:30

## 2023-02-11 RX ADMIN — LATANOPROST 1 DROP: 50 SOLUTION OPHTHALMIC at 21:36

## 2023-02-11 RX ADMIN — PANTOPRAZOLE SODIUM 40 MG: 40 TABLET, DELAYED RELEASE ORAL at 06:39

## 2023-02-11 NOTE — PROGRESS NOTES
End of Shift Note     Bedside shift change report given to Kristy RN (oncoming nurse) by Cayla Hickey RN (offgoing nurse). Report included the following information SBAR and Kardex     Shift worked: days   Shift summary and any significant changes:     tylenol  given for neck pain         Concerns for physician to address:  none   Zone phone for oncoming shift:   [phone]      Patient Information  Jhon Frye  80 y.o.  2/3/2023  9:02 AM by Radha Gomez MD. Jhon Frye was [facility] from [facility]     Problem List          Patient Active Problem List     Diagnosis Date Noted    History of hemorrhagic stroke with residual hemiplegia ([facility] [address] 02/04/2023    Hemiplegia of left nondominant side as late effect of cerebrovascular disease ([facility] [address] 02/04/2023    Stroke-like episode 02/03/2023              Past Medical History:   Diagnosis Date    Atrial fibrillation ([address]      CVA (cerebral vascular accident) ([facility] [address]      GERD (gastroesophageal reflux disease)      Glaucoma      HTN (hypertension)      Hyperlipidemia           Core Measures:  CVA: Yes Yes  CHF:No No  PNA:No No     Activity:  Activity Level: Bed Rest  Number times ambulated in hallways past shift: 0  Number of times OOB to chair past shift: 0     Cardiac:   Cardiac Monitoring: No      Cardiac Rhythm:      Access:   Current line(s): PIV   Central Line? No Placement date  Reason Medically Necessary   PICC LINE? No Placement date Reason Medically Necessary      Genitourinary:   Urinary status: voiding  Urinary Catheter?  No Placement Date  Reason Medically Necessary      Respiratory:   O2 Device: None (Room air)  Chronic home O2 use?: NO  Incentive spirometer at bedside: NO     GI:  Last Bowel Movement Date: 02/06/23  Current diet:  DIET ONE TIME MESSAGE  ADULT DIET Dysphagia - Pureed  Passing flatus: YES  Tolerating current diet: YES     Pain Management:   Patient states pain is manageable on current regimen: YES     Skin:  Mikey Score: 15  Interventions: increase time out of bed, PT/OT consult, and nutritional support     Patient Safety:  Fall Score:    Interventions: bed/chair alarm, assistive device (walker, cane, etc), gripper socks, and pt to call before getting OOB  @Rollbelt  @dexterity to release roll belt  Yes/No ( must document dexterity  here by stating Yes or No here, otherwise this is a restraint and must follow restraint documentation policy.)     DVT prophylaxis:  DVT prophylaxis Med- No  DVT prophylaxis SCD or JOSELO- No      Wounds: (If Applicable)  Wounds- No  Location      Active Consults:  IP CONSULT TO NEUROLOGY  IP CONSULT TO CARDIOLOGY     Length of Stay:  Expected LOS: 2d 21h  Actual LOS: 4  Discharge Plan: Yes Pending placement

## 2023-02-11 NOTE — PROGRESS NOTES
Hospitalist Progress Note    Subjective:   Daily Progress Note: 2/11/2023 2:24 PM    Hospital Course:  Breana Ramos is a 80 y.o.  female who presents with PMH of HTN, afib, CVA, DM, glaucoma, hypothyroidism, and GERD. Patient presenting from [facility] after episode of AMS this morning. Reportedly she fell from bed level yesterday and did not hit her head. Yesterday post fall she was fine at new baseline of bed bound with some dysarthria, however able to talk. This morning staff noted that she was altered by finding that she only responded to pain. Per son when he presented to ED, patient was back to her new baseline post CVA. Prior to stroke 3 weeks ago, she was living independently without a assisted walking device. She was driving and managing her ADLs independently with some short term memory difficulties. Since 1/2023 CVA she has been in rehab with left sided hemiplegia and left sided facial droop. MRI brain shows subacute hemorrhage associated with subacute infarct in right corona radiata/basal ganglia. Subjective:  -No events overnight. Discussed with the nurse. Patient still not able to lift left side/extremity    Assessment / Plan:  Acute encephalopathy:  Hemorrhagic transformation of recent stroke    MRI brain shows subacute hemorrhage associated with subacute infarct in right corona radiata/basal ganglia. Repeat CT head 2/5 stable hemorrhage  Repeat CT head in 1-2 week as outpatient. Neurology recommends to be off for xarelto for 4-6 weeks. Has elevated ammonia level- started on lactulose. Decreasedto twice daily      Cough:  Rhino and enterovirus +  Mucinex    Hypertension  Afib:  Hold xarelto in the setting of subacute hemorrhage  Hold  losartan, metoprolol  Resume amlodipine  Started on amiodarone at lower dose 100 mg po daily due to bradycardia  Appreciate cardiology consult  Outpatient cardiology follow up for watchman evaluation.      Diabetes mellitus:  Continue sliding scale    Glaucoma:  Continue eye drops        18.5 - 24.9 Normal weight / Body mass index is 20.34 kg/m². Code status: Full  Prophylaxis: SCD's  Recommended Disposition: LTC    -SNF denied, did P2P but still refused.  Can go to LTC/Home with 24 x7 assist       Current Facility-Administered Medications   Medication Dose Route Frequency    lactulose (CHRONULAC) 10 gram/15 mL solution 15 mL  10 g Oral BID    atorvastatin (LIPITOR) tablet 20 mg  20 mg Oral QHS    amiodarone (CORDARONE) tablet 100 mg  100 mg Oral DAILY    melatonin tablet 3 mg  3 mg Oral QHS PRN    sodium chloride (NS) flush 5-10 mL  5-10 mL IntraVENous PRN    aspirin chewable tablet 81 mg  81 mg Oral DAILY    latanoprost (XALATAN) 0.005 % ophthalmic solution 1 Drop  1 Drop Both Eyes QHS    levothyroxine (SYNTHROID) tablet 75 mcg  75 mcg Oral ACB    [Held by provider] losartan (COZAAR) tablet 100 mg  100 mg Oral DAILY    amLODIPine (NORVASC) tablet 5 mg  5 mg Oral DAILY    timolol (TIMOPTIC) 0.5 % ophthalmic solution 1 Drop  1 Drop Both Eyes BID    pantoprazole (PROTONIX) tablet 40 mg  40 mg Oral ACB    [Held by provider] rivaroxaban (XARELTO) tablet 15 mg  15 mg Oral DAILY    therapeutic multivitamin (THERAGRAN) tablet 1 Tablet  1 Tablet Oral DAILY    acetaminophen (TYLENOL) tablet 650 mg  650 mg Oral Q4H PRN    Or    acetaminophen (TYLENOL) solution 650 mg  650 mg Per NG tube Q4H PRN    Or    acetaminophen (TYLENOL) suppository 650 mg  650 mg Rectal Q4H PRN    magnesium hydroxide (MILK OF MAGNESIA) 400 mg/5 mL oral suspension 30 mL  30 mL Oral DAILY PRN    guaiFENesin ER (MUCINEX) tablet 600 mg  600 mg Oral Q12H    benzonatate (TESSALON) capsule 100 mg  100 mg Oral TID PRN    acetaminophen (TYLENOL) tablet 500 mg  500 mg Oral Q6H PRN    insulin lispro (HUMALOG) injection   SubCUTAneous AC&HS    glucose chewable tablet 16 g  4 Tablet Oral PRN    glucagon (GLUCAGEN) injection 1 mg  1 mg IntraMUSCular PRN    dextrose 10% infusion 0-250 mL  0-250 mL IntraVENous PRN        Review of Systems  Constitutional: No fevers, No chills, No sweats, No fatigue, No Weakness  Eyes: No redness  Ears, nose, mouth, throat, and face: No nasal congestion, No sore throat, No voice change  Respiratory: No Shortness of Breath, No cough, No wheezing  Cardiovascular: No chest pain, No palpitations, No extremity edema  Gastrointestinal: No nausea, No vomiting, No diarrhea, No abdominal pain  Genitourinary: No frequency, No dysuria, No hematuria  Integument/breast: No skin lesion(s)   Neurological: has mild Confusion, No headaches, No dizziness      Objective:     Visit Vitals  BP (!) 141/61   Pulse (!) 56   Temp 97.5 °F (36.4 °C)   Resp 18   Ht 5' 8\" (1.727 m)   Wt 60.7 kg (133 lb 12.8 oz)   SpO2 100%   BMI 20.34 kg/m²      O2 Device: None (Room air)    Temp (24hrs), Av.9 °F (36.6 °C), Min:97.5 °F (36.4 °C), Max:98.4 °F (36.9 °C)      701 - 1900  In: 237 [P.O.:237]  Out: -   1901 - 700  In: -   Out: 2     PHYSICAL EXAM:  Constitutional: No acute distress  Skin: Extremities and face reveal no rashes. HEENT: Sclerae anicteric. Extra-occular muscles are intact. No oral ulcers. The neck is supple and no masses. Cardiovascular: Regular rate and rhythm. Respiratory:  Clear breath sounds bilaterally with no wheezes, rales, or rhonchi. GI: Abdomen nondistended, soft, and nontender. Normal active bowel sounds. Musculoskeletal: No pitting edema of the lower legs. Neurological:  Patient was sleepy this morning.   Left-sided dense paralysis, no change in past few days  Psychiatric: Mood appears appropriate       Data Review    Recent Results (from the past 24 hour(s))   GLUCOSE, POC    Collection Time: 02/10/23  4:32 PM   Result Value Ref Range    Glucose (POC) 101 65 - 117 mg/dL    Performed by Bibi Ceballos PCT    GLUCOSE, POC    Collection Time: 02/10/23  9:49 PM   Result Value Ref Range    Glucose (POC) 117 65 - 117 mg/dL    Performed by Kei Levine PCT    GLUCOSE, POC    Collection Time: 02/10/23 10:38 PM   Result Value Ref Range    Glucose (POC) 129 (H) 65 - 117 mg/dL    Performed by Chelsea Romero (KEVIN RN)    GLUCOSE, POC    Collection Time: 02/11/23  6:49 AM   Result Value Ref Range    Glucose (POC) 111 65 - 117 mg/dL    Performed by Rubio Lindo (CON)    GLUCOSE, POC    Collection Time: 02/11/23 11:36 AM   Result Value Ref Range    Glucose (POC) 131 (H) 65 - 117 mg/dL    Performed by Pavan Gamez PCT                  Time spent 35 minutes involving direct patient care as well as reviewing patient's labs and coordination of care with nursing staff     Care Plan discussed with: Patient/Family/RN/Case Management        Total time spent with patient: 35 minutes.

## 2023-02-12 LAB
GLUCOSE BLD STRIP.AUTO-MCNC: 106 MG/DL (ref 65–117)
GLUCOSE BLD STRIP.AUTO-MCNC: 112 MG/DL (ref 65–117)
GLUCOSE BLD STRIP.AUTO-MCNC: 134 MG/DL (ref 65–117)
GLUCOSE BLD STRIP.AUTO-MCNC: 155 MG/DL (ref 65–117)
SERVICE CMNT-IMP: ABNORMAL
SERVICE CMNT-IMP: ABNORMAL
SERVICE CMNT-IMP: NORMAL
SERVICE CMNT-IMP: NORMAL

## 2023-02-12 PROCEDURE — 74011250637 HC RX REV CODE- 250/637: Performed by: INTERNAL MEDICINE

## 2023-02-12 PROCEDURE — 65270000046 HC RM TELEMETRY

## 2023-02-12 PROCEDURE — 51798 US URINE CAPACITY MEASURE: CPT

## 2023-02-12 PROCEDURE — 74011250637 HC RX REV CODE- 250/637: Performed by: STUDENT IN AN ORGANIZED HEALTH CARE EDUCATION/TRAINING PROGRAM

## 2023-02-12 PROCEDURE — 82962 GLUCOSE BLOOD TEST: CPT

## 2023-02-12 PROCEDURE — 74011636637 HC RX REV CODE- 636/637: Performed by: STUDENT IN AN ORGANIZED HEALTH CARE EDUCATION/TRAINING PROGRAM

## 2023-02-12 RX ADMIN — LEVOTHYROXINE SODIUM 75 MCG: 0.07 TABLET ORAL at 09:06

## 2023-02-12 RX ADMIN — PANTOPRAZOLE SODIUM 40 MG: 40 TABLET, DELAYED RELEASE ORAL at 08:52

## 2023-02-12 RX ADMIN — TIMOLOL MALEATE 1 DROP: 5 SOLUTION OPHTHALMIC at 17:12

## 2023-02-12 RX ADMIN — GUAIFENESIN 600 MG: 600 TABLET, EXTENDED RELEASE ORAL at 08:52

## 2023-02-12 RX ADMIN — ASPIRIN 81 MG: 81 TABLET, CHEWABLE ORAL at 09:06

## 2023-02-12 RX ADMIN — AMLODIPINE BESYLATE 5 MG: 5 TABLET ORAL at 08:52

## 2023-02-12 RX ADMIN — TIMOLOL MALEATE 1 DROP: 5 SOLUTION OPHTHALMIC at 08:53

## 2023-02-12 RX ADMIN — LACTULOSE 15 ML: 20 SOLUTION ORAL at 08:51

## 2023-02-12 RX ADMIN — LACTULOSE 15 ML: 20 SOLUTION ORAL at 17:10

## 2023-02-12 RX ADMIN — LATANOPROST 1 DROP: 50 SOLUTION OPHTHALMIC at 21:54

## 2023-02-12 RX ADMIN — AMIODARONE HYDROCHLORIDE 100 MG: 200 TABLET ORAL at 08:51

## 2023-02-12 RX ADMIN — ATORVASTATIN CALCIUM 20 MG: 20 TABLET, FILM COATED ORAL at 21:52

## 2023-02-12 RX ADMIN — THERA TABS 1 TABLET: TAB at 08:52

## 2023-02-12 RX ADMIN — Medication 2 UNITS: at 11:49

## 2023-02-12 NOTE — PROGRESS NOTES
Hospitalist Progress Note    Subjective:   Daily Progress Note: 2/12/2023 2:24 PM    Hospital Course:  Mykel Omer is a 80 y.o.  female who presents with PMH of HTN, afib, CVA, DM, glaucoma, hypothyroidism, and GERD. Patient presenting from [facility] after episode of AMS this morning. Reportedly she fell from bed level yesterday and did not hit her head. Yesterday post fall she was fine at new baseline of bed bound with some dysarthria, however able to talk. This morning staff noted that she was altered by finding that she only responded to pain. Per son when he presented to ED, patient was back to her new baseline post CVA. Prior to stroke 3 weeks ago, she was living independently without a assisted walking device. She was driving and managing her ADLs independently with some short term memory difficulties. Since 1/2023 CVA she has been in rehab with left sided hemiplegia and left sided facial droop. MRI brain shows subacute hemorrhage associated with subacute infarct in right corona radiata/basal ganglia. Subjective:  -No events overnight. Discussed with the nurse. Patient still not able to lift left side/extremity    Assessment / Plan:  Acute encephalopathy:  Hemorrhagic transformation of recent stroke    MRI brain shows subacute hemorrhage associated with subacute infarct in right corona radiata/basal ganglia. Repeat CT head 2/5 stable hemorrhage  Repeat CT head in 1-2 week as outpatient. Neurology recommends to be off for xarelto for 4-6 weeks. Has elevated ammonia level- started on lactulose. Decreasedto twice daily      Cough:  Rhino and enterovirus +  Mucinex    Hypertension  Afib:  Hold xarelto in the setting of subacute hemorrhage  Hold  losartan, metoprolol  Resume amlodipine  Started on amiodarone at lower dose 100 mg po daily due to bradycardia  Appreciate cardiology consult  Outpatient cardiology follow up for watchman evaluation.      Diabetes mellitus:  Continue sliding scale    Glaucoma:  Continue eye drops        18.5 - 24.9 Normal weight / Body mass index is 20.34 kg/m². Code status: Full  Prophylaxis: SCD's  Recommended Disposition: LTC    -SNF denied, did P2P but still refused.  Can go to LTC/Home with 24 x7 assist       Current Facility-Administered Medications   Medication Dose Route Frequency    lactulose (CHRONULAC) 10 gram/15 mL solution 15 mL  10 g Oral BID    atorvastatin (LIPITOR) tablet 20 mg  20 mg Oral QHS    amiodarone (CORDARONE) tablet 100 mg  100 mg Oral DAILY    melatonin tablet 3 mg  3 mg Oral QHS PRN    sodium chloride (NS) flush 5-10 mL  5-10 mL IntraVENous PRN    aspirin chewable tablet 81 mg  81 mg Oral DAILY    latanoprost (XALATAN) 0.005 % ophthalmic solution 1 Drop  1 Drop Both Eyes QHS    levothyroxine (SYNTHROID) tablet 75 mcg  75 mcg Oral ACB    [Held by provider] losartan (COZAAR) tablet 100 mg  100 mg Oral DAILY    amLODIPine (NORVASC) tablet 5 mg  5 mg Oral DAILY    timolol (TIMOPTIC) 0.5 % ophthalmic solution 1 Drop  1 Drop Both Eyes BID    pantoprazole (PROTONIX) tablet 40 mg  40 mg Oral ACB    [Held by provider] rivaroxaban (XARELTO) tablet 15 mg  15 mg Oral DAILY    therapeutic multivitamin (THERAGRAN) tablet 1 Tablet  1 Tablet Oral DAILY    acetaminophen (TYLENOL) tablet 650 mg  650 mg Oral Q4H PRN    Or    acetaminophen (TYLENOL) solution 650 mg  650 mg Per NG tube Q4H PRN    Or    acetaminophen (TYLENOL) suppository 650 mg  650 mg Rectal Q4H PRN    magnesium hydroxide (MILK OF MAGNESIA) 400 mg/5 mL oral suspension 30 mL  30 mL Oral DAILY PRN    guaiFENesin ER (MUCINEX) tablet 600 mg  600 mg Oral Q12H    benzonatate (TESSALON) capsule 100 mg  100 mg Oral TID PRN    acetaminophen (TYLENOL) tablet 500 mg  500 mg Oral Q6H PRN    insulin lispro (HUMALOG) injection   SubCUTAneous AC&HS    glucose chewable tablet 16 g  4 Tablet Oral PRN    glucagon (GLUCAGEN) injection 1 mg  1 mg IntraMUSCular PRN    dextrose 10% infusion 0-250 mL  0-250 mL IntraVENous PRN        Review of Systems  Constitutional: No fevers, No chills, No sweats, No fatigue, No Weakness  Eyes: No redness  Ears, nose, mouth, throat, and face: No nasal congestion, No sore throat, No voice change  Respiratory: No Shortness of Breath, No cough, No wheezing  Cardiovascular: No chest pain, No palpitations, No extremity edema  Gastrointestinal: No nausea, No vomiting, No diarrhea, No abdominal pain  Genitourinary: No frequency, No dysuria, No hematuria  Integument/breast: No skin lesion(s)   Neurological: has mild Confusion, No headaches, No dizziness      Objective:     Visit Vitals  BP (!) 148/80   Pulse 78   Temp 97.9 °F (36.6 °C)   Resp 18   Ht 5' 8\" (1.727 m)   Wt 60.7 kg (133 lb 12.8 oz)   SpO2 100%   BMI 20.34 kg/m²      O2 Device: None (Room air)    Temp (24hrs), Av.9 °F (36.6 °C), Min:97.7 °F (36.5 °C), Max:98 °F (36.7 °C)      No intake/output data recorded. 02/10 1901 -  0700  In: 417 [P.O.:417]  Out: -     PHYSICAL EXAM:  Constitutional: No acute distress  Skin: Extremities and face reveal no rashes. HEENT: Sclerae anicteric. Extra-occular muscles are intact. No oral ulcers. The neck is supple and no masses. Cardiovascular: Regular rate and rhythm. Respiratory:  Clear breath sounds bilaterally with no wheezes, rales, or rhonchi. GI: Abdomen nondistended, soft, and nontender. Normal active bowel sounds. Musculoskeletal: No pitting edema of the lower legs. Neurological:  Patient was sleepy this morning.   Left-sided dense paralysis, no change in past few days  Psychiatric: Mood appears appropriate       Data Review    Recent Results (from the past 24 hour(s))   GLUCOSE, POC    Collection Time: 23 11:36 AM   Result Value Ref Range    Glucose (POC) 131 (H) 65 - 117 mg/dL    Performed by Chesnee Handing PCT    GLUCOSE, POC    Collection Time: 23  4:47 PM   Result Value Ref Range    Glucose (POC) 113 65 - 117 mg/dL    Performed by Chesnee Handing PCT GLUCOSE, POC    Collection Time: 02/11/23  9:40 PM   Result Value Ref Range    Glucose (POC) 127 (H) 65 - 117 mg/dL    Performed by TagManm Lennox PCT    GLUCOSE, POC    Collection Time: 02/12/23  7:02 AM   Result Value Ref Range    Glucose (POC) 112 65 - 117 mg/dL    Performed by TagManm Lennox PCT                  Time spent 35 minutes involving direct patient care as well as reviewing patient's labs and coordination of care with nursing staff     Care Plan discussed with: Patient/Family/RN/Case Management        Total time spent with patient: 35 minutes.

## 2023-02-12 NOTE — PROGRESS NOTES
End of Shift Note     Bedside shift change report given to Vivi Dykes (oncoming nurse) by Gertrude Topete RN (offgoing nurse). Report included the following information SBAR and Kardex     Shift worked: days   Shift summary and any significant changes:     Poor appetite         Concerns for physician to address:  none   Zone phone for oncoming shift:   [phone]      Patient Information  Yulia Hays  80 y.o.  2/3/2023  9:02 AM by Macrina Martínez MD. Yulia Hays was [facility] from [facility]     Problem List          Patient Active Problem List     Diagnosis Date Noted    History of hemorrhagic stroke with residual hemiplegia ([address] 02/04/2023    Hemiplegia of left nondominant side as late effect of cerebrovascular disease ([facility] [address] 02/04/2023    Stroke-like episode 02/03/2023              Past Medical History:   Diagnosis Date    Atrial fibrillation ([address]      CVA (cerebral vascular accident) ([address]      GERD (gastroesophageal reflux disease)      Glaucoma      HTN (hypertension)      Hyperlipidemia           Core Measures:  CVA: Yes Yes  CHF:No No  PNA:No No     Activity:  Activity Level: Bed Rest  Number times ambulated in hallways past shift: 0  Number of times OOB to chair past shift: 0     Cardiac:   Cardiac Monitoring: No      Cardiac Rhythm:      Access:   Current line(s): PIV   Central Line? No Placement date  Reason Medically Necessary   PICC LINE? No Placement date Reason Medically Necessary      Genitourinary:   Urinary status: voiding  Urinary Catheter?  No Placement Date  Reason Medically Necessary      Respiratory:   O2 Device: None (Room air)  Chronic home O2 use?: NO  Incentive spirometer at bedside: NO     GI:  Last Bowel Movement Date: 02/06/23  Current diet:  DIET ONE TIME MESSAGE  ADULT DIET Dysphagia - Pureed  Passing flatus: YES  Tolerating current diet: YES     Pain Management:   Patient states pain is manageable on current regimen: YES     Skin:  Mikey Score: 15  Interventions: increase time out of bed, PT/OT consult, and nutritional support     Patient Safety:  Fall Score:    Interventions: bed/chair alarm, assistive device (walker, cane, etc), gripper socks, and pt to call before getting OOB  @Rollbelt  @dexterity to release roll belt  Yes/No ( must document dexterity  here by stating Yes or No here, otherwise this is a restraint and must follow restraint documentation policy.)     DVT prophylaxis:  DVT prophylaxis Med- No  DVT prophylaxis SCD or JOSELO- No      Wounds: (If Applicable)  Wounds- No  Location      Active Consults:  IP CONSULT TO NEUROLOGY  IP CONSULT TO CARDIOLOGY     Length of Stay:  Expected LOS: 2d 21h  Actual LOS: 4  Discharge Plan: Yes Pending placement                                                                                    Revision History

## 2023-02-12 NOTE — PROGRESS NOTES
Transition of Care Plan:     RUR: 13%  Disposition: [employer] denied SNF placement; LTC pvt pay - family has selected [facility]  Follow up appointments: Pt does not have a PCP  DME needed: None  Transportation at Discharge: [facility] on will call [facility] - placed via [facility] or means to access home:   Pt has access     IM Medicare Letter: 2nd IM Letter needed at d/c; 1st IM provided 2/5   Is patient a  and connected with the [facility] [address] [facility]? No              If yes, was [employer] transfer form completed and VA notified? Caregiver Contact: Ayan Pryor- Damien [phone]  Discharge Caregiver contacted prior to discharge? Yes  Care Conference needed?: No    CM received correspondence from [place] [employer] that appeal was denied and beneficiary liability starts on 2/13/23. CM has informed CM leadership re: [place] determination. CM has contacted Mr. Joe Palencia [phone] to review determination. He acknowledges this determination, states that he has been talking with Tashi Vital at [facility] and understands pt will be discharged from hospital on Monday 2/13. Family has chosen [facility] as destination. Plans to re-attempt for [employer] skilled auth at [facility]. CM will need to follow up with admissions team at [facility] on tomorrow AM as they are unavailable to be reached on weekend. CM has placed [facility] on will call for [facility]/13. Care management will continue to be available to assist as transition of care planning needs arise.         Monica Geiger [address]

## 2023-02-12 NOTE — PROGRESS NOTES
End of Shift Note     Bedside shift change report given to Ivory RN (oncoming nurse) by Aurelia Phillips RN (offgoing nurse). Report included the following information SBAR and Kardex     Shift worked: 7p-7a   Shift summary and any significant changes:     Pt voided. Bladder scan 40mLs. AOX2, systolic BP a little elevated. No significant changes over night. Concerns for physician to address:  none   Zone phone for oncoming shift:   [phone]      Patient Information  Ghada Fisher  80 y.o.  2/3/2023  9:02 AM by Goldy Sanz MD. Ghada Fisher was admitted from [facility]     Problem List          Patient Active Problem List     Diagnosis Date Noted    History of hemorrhagic stroke with residual hemiplegia ([facility] [address] 02/04/2023    Hemiplegia of left nondominant side as late effect of cerebrovascular disease ([address] 02/04/2023    Stroke-like episode 02/03/2023              Past Medical History:   Diagnosis Date    Atrial fibrillation ([address]      CVA (cerebral vascular accident) ([address]      GERD (gastroesophageal reflux disease)      Glaucoma      HTN (hypertension)      Hyperlipidemia           Core Measures:  CVA: Yes Yes  CHF:No No  PNA:No No     Activity:  Activity Level: Bed Rest  Number times ambulated in hallways past shift: 0  Number of times OOB to chair past shift: 0     Cardiac:   Cardiac Monitoring: No      Cardiac Rhythm:      Access:   Current line(s): PIV   Central Line? No Placement date  Reason Medically Necessary   PICC LINE? No Placement date Reason Medically Necessary      Genitourinary:   Urinary status: voiding  Urinary Catheter?  No Placement Date  Reason Medically Necessary      Respiratory:   O2 Device: None (Room air)  Chronic home O2 use?: NO  Incentive spirometer at bedside: NO     GI:  Last Bowel Movement Date: 02/06/23  Current diet:  DIET ONE TIME MESSAGE  ADULT DIET Dysphagia - Pureed  Passing flatus: YES  Tolerating current diet: YES     Pain Management:   Patient states pain is manageable on current regimen: YES     Skin:  Mikey Score: 15  Interventions: increase time out of bed, PT/OT consult, and nutritional support     Patient Safety:  Fall Score:    Interventions: bed/chair alarm, assistive device (walker, cane, etc), gripper socks, and pt to call before getting OOB  @Rollbelt  @dexterity to release roll belt  Yes/No ( must document dexterity  here by stating Yes or No here, otherwise this is a restraint and must follow restraint documentation policy.)     DVT prophylaxis:  DVT prophylaxis Med- No  DVT prophylaxis SCD or JOSELO- No      Wounds: (If Applicable)  Wounds- No  Location      Active Consults:  IP CONSULT TO NEUROLOGY  IP CONSULT TO CARDIOLOGY     Length of Stay:  Expected LOS: 2d 21h  Actual LOS: 4  Discharge Plan: Yes Pending placement

## 2023-02-13 VITALS
WEIGHT: 133.8 LBS | RESPIRATION RATE: 20 BRPM | TEMPERATURE: 97.9 F | OXYGEN SATURATION: 99 % | DIASTOLIC BLOOD PRESSURE: 78 MMHG | BODY MASS INDEX: 20.28 KG/M2 | HEIGHT: 68 IN | SYSTOLIC BLOOD PRESSURE: 156 MMHG | HEART RATE: 63 BPM

## 2023-02-13 LAB
GLUCOSE BLD STRIP.AUTO-MCNC: 102 MG/DL (ref 65–117)
GLUCOSE BLD STRIP.AUTO-MCNC: 127 MG/DL (ref 65–117)
GLUCOSE BLD STRIP.AUTO-MCNC: NORMAL MG/DL (ref 65–117)
SARS-COV-2 RDRP RESP QL NAA+PROBE: NOT DETECTED
SERVICE CMNT-IMP: ABNORMAL
SERVICE CMNT-IMP: NORMAL
SERVICE CMNT-IMP: NORMAL
SOURCE, COVRS: NORMAL

## 2023-02-13 PROCEDURE — 87635 SARS-COV-2 COVID-19 AMP PRB: CPT

## 2023-02-13 PROCEDURE — 74011250637 HC RX REV CODE- 250/637: Performed by: INTERNAL MEDICINE

## 2023-02-13 PROCEDURE — 82962 GLUCOSE BLOOD TEST: CPT

## 2023-02-13 PROCEDURE — 92526 ORAL FUNCTION THERAPY: CPT

## 2023-02-13 PROCEDURE — 74011250637 HC RX REV CODE- 250/637: Performed by: STUDENT IN AN ORGANIZED HEALTH CARE EDUCATION/TRAINING PROGRAM

## 2023-02-13 RX ADMIN — GUAIFENESIN 600 MG: 600 TABLET, EXTENDED RELEASE ORAL at 08:33

## 2023-02-13 RX ADMIN — LEVOTHYROXINE SODIUM 75 MCG: 0.07 TABLET ORAL at 08:32

## 2023-02-13 RX ADMIN — THERA TABS 1 TABLET: TAB at 09:00

## 2023-02-13 RX ADMIN — AMIODARONE HYDROCHLORIDE 100 MG: 200 TABLET ORAL at 08:32

## 2023-02-13 RX ADMIN — ASPIRIN 81 MG: 81 TABLET, CHEWABLE ORAL at 08:32

## 2023-02-13 RX ADMIN — PANTOPRAZOLE SODIUM 40 MG: 40 TABLET, DELAYED RELEASE ORAL at 08:33

## 2023-02-13 RX ADMIN — BENZONATATE 100 MG: 100 CAPSULE ORAL at 08:32

## 2023-02-13 RX ADMIN — AMLODIPINE BESYLATE 5 MG: 5 TABLET ORAL at 08:33

## 2023-02-13 RX ADMIN — ACETAMINOPHEN 500 MG: 500 TABLET ORAL at 08:33

## 2023-02-13 RX ADMIN — LACTULOSE 15 ML: 20 SOLUTION ORAL at 08:34

## 2023-02-13 RX ADMIN — TIMOLOL MALEATE 1 DROP: 5 SOLUTION OPHTHALMIC at 08:34

## 2023-02-13 NOTE — PROGRESS NOTES
Transition of Care Plan:     RUR: 13%    JACKY: 2/13  [facility] will transport at 2 PM     Disposition: LTC pvt pay - family has selected [address] [place] [address] Floor  RN will call report to [facility] [address] Unit: Room 306  RAPID COVID: Negative    12:28 PM   CM called SNF and Pt will go to [place] Unit and Room 306. No further CM needs. 11:46 AM  [facility] transport scheduled for 2 PM      11:25 AM  Talked to Admission at [address] [place] [address] Floor: They have talked to family and are willing to take under Private Pay if necessary. They have a bed today. They will call me back with room number. Ok to set up transport around 2 PM today. Pt Son, Mr. Remy Juarez has talked to [address] [place] [address] and is in agreement with LTC/Private Pay today. CM called [facility] and as ride was on will call. Requested a 2 PM .     9:10 AM  CM completed chart review. CM received correspondence from Anne [place] and Cas that appeal was denied and beneficiary liability starts on 2/13/23. CM has informed CM leadership re: Anne Cabello determination. CM has contacted Mr. Remy Juarez [phone] to review determination. He acknowledges this determination, states that he has been talking with Adria Perkins at [address] [place] [address] and understands pt will be discharged from hospital on Monday 2/13. Family has chosen [address] [place] [address],[address] [place] [address] as destination. Plans to re-attempt for [employer] skilled auth at [address] [place] [address]. CM will need to follow up with admissions team at [address] [place] [address] on tomorrow AM as they are unavailable to be reached on weekend. CM has placed [facility] on will call for 2/13. Care management will continue to be available to assist as transition of care planning needs arise. CM will need to talk to [address] [place] [address] after rounds and morning meeting to see if they have a LTC bed? If not will need to do a LTC bed search to see who can offer a LTC bed. Follow up appointments: Pt does not have a PCP  DME needed: None  Transportation at Discharge:  [facility] on will call for 2/13 - [facility] via [facility] or means to access home:   Pt has access     IM Medicare Letter: 2nd IM Letter needed at d/c; 1st IM provided 2/5   Is patient a [place] and connected with the [place]? No              If yes, was [employer] transfer form completed and [facility] notified? Caregiver Contact: Aris Carrel- Son [phone]  Discharge Caregiver contacted prior to discharge?   Yes  Care Conference needed?: No    Care Management Interventions  PCP Verified by CM:  (Pt does not have a PCP.)  Palliative Care Criteria Met (RRAT>21 & CHF Dx)?: No  Mode of Transport at Discharge:  (Pt will need medical transport at d/c. )  Transition of Care Consult (CM Consult): Discharge Planning ([address])  Discharge Durable Medical Equipment: No (No DME's)  Physical Therapy Consult: Yes  Occupational Therapy Consult: Yes  Speech Therapy Consult: Yes  Support Systems: Child(beatriz) (Pt resides alone in a tri-level for 4/5 ASHWINI. )  Confirm Follow Up Transport: Family (Pt does not drive)  [facility] Provided?: No  Discharge Location  Patient Expects to be Discharged to[de-identified] [facility]

## 2023-02-13 NOTE — PROGRESS NOTES
Problem: Dysphagia (Adult)  Goal: *Acute Goals and Plan of Care (Insert Text)  Description: Speech pathology goals  Initiated 2/4/2023  1. Patient will tolerate soft and bite sized/thin liquid diet with no adverse effects within 7 days. Changed to Minced&Moist/Thin Liquid  Outcome: Progressing Towards Goal   SPEECH LANGUAGE PATHOLOGY DYSPHAGIA TREATMENT  Patient: Bria Martínez (80 y.o. female)  Date: 2/13/2023  Diagnosis: Stroke-like episode [R29.90] <principal problem not specified>      Precautions: Bed Alarm, Contact    ASSESSMENT:  Patient continues with prolonged and effortful mastication of solid requiring several liquid washes to fully clear oral cavity. No overt s/s aspiration with successive straw sips of thins. PCT who has assisted patient with meals the last couple days report tolerance; though difficulty with some items. Patient leaving this afternoon to [facility] for rehab. Do not recommend advancement past the minced/moist diet texture. PLAN:  Recommendations and Planned Interventions:  -- Continue on minced/moist diet/ thin liquids. Straw ok  -- meds crushed  -- alternate liquid/solid  -- encourage slow, loud, clear speech    Patient continues to benefit from skilled intervention to address the above impairments. Continue treatment per established plan of care. Discharge Recommendations:  Skilled Nursing Facility     SUBJECTIVE:   Patient alert, pleassant. OBJECTIVE:   Cognitive and Communication Status:  Neurologic State: Alert  Orientation Level: Oriented to person, Oriented to place  Cognition: Impaired decision making, Follows commands  Perception: Cues to maintain midline in sitting, Cues to attend to left side of body, Cues to attend left visual field, Tactile, Verbal, Visual  Perseveration: No perseveration noted  Safety/Judgement: Decreased insight into deficits  Dysphagia Treatment:       P.O.  Trials:  Patient Position: up in bed  Vocal quality prior to P.O.: No impairment  Consistency Presented: Thin liquid; Solid  How Presented: Self-fed/presented;Straw;Successive swallows     Bolus Acceptance: No impairment  Bolus Formation/Control: Impaired  Type of Impairment: Delayed;Mastication  Propulsion: Delayed (# of seconds)  Oral Residue: None (after several liquid washes)                 Pain:  Pain Scale 1: Visual  Pain Intensity 1: 0       After treatment:   Patient left in no apparent distress in bed, Call bell within reach, and Nursing notified    COMMUNICATION/EDUCATION:     The patient's plan of care including recommendations, planned interventions, and recommended diet changes were discussed with: Registered nurse.      Jaswinder Verdin SLP  Time Calculation: 18 mins

## 2023-02-13 NOTE — PROGRESS NOTES
Pharmacist Discharge Medication Reconciliation      Significant PMH:   Past Medical History:   Diagnosis Date    Atrial fibrillation ([facility] [address]     CVA (cerebral vascular accident) ([facility] [address]     GERD (gastroesophageal reflux disease)     Glaucoma     HTN (hypertension)     Hyperlipidemia      Encounter Diagnoses:   Encounter Diagnoses   Name Primary? Encephalopathy [G93.40 (ICD-10-CM)] Yes    Stroke-like episode [R29.90 (ICD-10-CM)]     Cerebral vascular disease [I67.9 (ICD-10-CM)]      Allergies: Patient has no known allergies. Admission date: 2/3/2023     Discharge Medications:   Current Discharge Medication List        START taking these medications    Details   lactulose (CHRONULAC) 10 gram/15 mL solution Take 15 mL by mouth daily (with dinner) for 30 days. Qty: 450 mL, Refills: 0  Start date: 2/10/2023, End date: 3/12/2023      atorvastatin (LIPITOR) 20 mg tablet Take 1 Tablet by mouth nightly for 30 days. Qty: 30 Tablet, Refills: 0  Start date: 2/10/2023, End date: 3/12/2023           CONTINUE these medications which have CHANGED    Details   amiodarone (CORDARONE) 200 mg tablet Take 0.5 Tablets by mouth daily for 30 days. Qty: 15 Tablet, Refills: 0  Start date: 2/10/2023, End date: 3/12/2023           CONTINUE these medications which have NOT CHANGED    Details   acetaminophen (Tylenol 8 Hour) 650 mg TbER Take 650 mg by mouth every eight (8) hours. aspirin 81 mg chewable tablet Take 81 mg by mouth daily. latanoprost (XALATAN) 0.005 % ophthalmic solution Administer 1 Drop to both eyes nightly. levothyroxine (SYNTHROID) 75 mcg tablet Take 75 mcg by mouth Daily (before breakfast). multivitamin (ONE A DAY) tablet Take 1 Tablet by mouth daily. amLODIPine (Norvasc) 5 mg tablet Take 5 mg by mouth daily. Omeprazole delayed release (PRILOSEC D/R) 20 mg tablet Take 20 mg by mouth daily. timolol (TIMOPTIC) 0.5 % ophthalmic solution 1 Drop two (2) times a day.            STOP taking these medications       losartan (COZAAR) 100 mg tablet Comments:   Reason for Stopping:         metoprolol tartrate (LOPRESSOR) 25 mg tablet Comments:   Reason for Stopping:         rivaroxaban (Xarelto) 20 mg tab tablet Comments:   Reason for Stopping:               The patient's chart, MAR and AVS were reviewed by Casimiro Salinas [facility].     Discharging Provider: Olivia Mcelroy MD     Thank you,     Casimiro Salinas [facility]

## 2023-02-13 NOTE — DISCHARGE SUMMARY
Hospitalist Discharge Summary     Patient ID:  Patrick Tobar  [id number]  80 y.o.  6/14/1932  2/3/2023    PCP on record: None    Admit date: 2/3/2023  Discharge date and time: 2/13/2023    DISCHARGE DIAGNOSIS:    Acute encephalopathy:  Hemorrhagic transformation of recent stroke  Rhino and enterovirus   Hypertension  Afib:         CONSULTATIONS:  IP CONSULT TO NEUROLOGY  IP CONSULT TO PALLIATIVE CARE - PROVIDER  IP CONSULT TO CARDIOLOGY    Excerpted HPI from H&P of Dante Downing MD:  Patrick Tobar is a 80 y.o.  female who presents with PMH of HTN, afib, CVA, DM, glaucoma, hypothyroidism, and GERD. Patient poor historian, alert and oriented to self and very nearly date with month, day, and \"2003\". Unaware of location. History collected from chart and son Brian Walker. Patient presenting from [facility] after episode of AMS this morning. Reportedly she fell from bed level yesterday and did not hit her head. Yesterday post fall she was fine at new baseline of bed bound with some dysarthria, however able to talk. This morning staff noted that she was altered by finding that she only responded to pain. Per son when he presented to ED, patient was back to her new baseline post CVA. Prior to stroke 3 weeks ago, she was living independently without a assisted walking device. She was driving and managing her ADLs independently with some short term memory difficulties. Since 1/2023 CVA she has been in rehab with left sided hemiplegia and left sided facial droop. Pt is noted to have some congestion and cough during interview. Rapid COVID and flu test negative.    ______________________________________________________________________  DISCHARGE SUMMARY/HOSPITAL COURSE:  for full details see H&P, daily progress notes, labs, consult notes. Patrick Tobar is a 80 y.o.  female who presents with PMH of HTN, afib, CVA, DM, glaucoma, hypothyroidism, and GERD.   Patient presenting from [facility] after episode of AMS . Reportedly she fell from bed level yesterday and did not hit her head. In the ED patient found to have hemorrhagic conversion of her recent right basal ganglia stroke. She had left-sided weakness from prior stroke and was sent to rehab. Now due to hemorrhagic conversion it was dense hemiplegia left. CT was repeated, which showed stable hemorrhage. Patient was on Xarelto which on hold since admission. Patient had a poor participation with PT and OT, they recommended long-term care placement, with poor candidate for rehabilitation. SNF was tried initially, but was declined by [facility], also did peer to peer which was declined as well. Patient has been stable for long-term care placement/home discharge with family support. Other medical problems and plan  Acute encephalopathy:  Hemorrhagic transformation of recent stroke     MRI brain shows subacute hemorrhage associated with subacute infarct in right corona radiata/basal ganglia. Repeat CT head 2/5 stable hemorrhage  Repeat CT head in 1-2 week as outpatient. Neurology recommends to be off for xarelto for 4-6 weeks. Has elevated ammonia level- started on lactulose. Decrease to twice daily        Cough:  Rhino and enterovirus +  Mucinex     Hypertension  Afib:  Hold xarelto in the setting of subacute hemorrhage  Hold  losartan, metoprolol  Resume amlodipine  Started on amiodarone at lower dose 100 mg po daily due to bradycardia  Appreciate cardiology consult  Outpatient cardiology follow up for watchman evaluation. Diabetes mellitus:  Continue sliding scale     Glaucoma:  Continue eye drops       _______________________________________________________________________  Patient seen and examined by me on discharge day. Pertinent Findings:  Gen:    Not in distress  Chest: Clear lungs  CVS:   Regular rhythm.   No edema  Abd:  Soft, not distended, not tender  Neuro:  Alert, dense left-sided paralysis  _______________________________________________________________________  DISCHARGE MEDICATIONS:   Current Discharge Medication List        START taking these medications    Details   lactulose (CHRONULAC) 10 gram/15 mL solution Take 15 mL by mouth daily (with dinner) for 30 days. Qty: 450 mL, Refills: 0  Start date: 2/10/2023, End date: 3/12/2023      atorvastatin (LIPITOR) 20 mg tablet Take 1 Tablet by mouth nightly for 30 days. Qty: 30 Tablet, Refills: 0  Start date: 2/10/2023, End date: 3/12/2023           CONTINUE these medications which have CHANGED    Details   amiodarone (CORDARONE) 200 mg tablet Take 0.5 Tablets by mouth daily for 30 days. Qty: 15 Tablet, Refills: 0  Start date: 2/10/2023, End date: 3/12/2023           CONTINUE these medications which have NOT CHANGED    Details   acetaminophen (Tylenol 8 Hour) 650 mg TbER Take 650 mg by mouth every eight (8) hours. aspirin 81 mg chewable tablet Take 81 mg by mouth daily. latanoprost (XALATAN) 0.005 % ophthalmic solution Administer 1 Drop to both eyes nightly. levothyroxine (SYNTHROID) 75 mcg tablet Take 75 mcg by mouth Daily (before breakfast). multivitamin (ONE A DAY) tablet Take 1 Tablet by mouth daily. amLODIPine (Norvasc) 5 mg tablet Take 5 mg by mouth daily. Omeprazole delayed release (PRILOSEC D/R) 20 mg tablet Take 20 mg by mouth daily. timolol (TIMOPTIC) 0.5 % ophthalmic solution 1 Drop two (2) times a day. STOP taking these medications       losartan (COZAAR) 100 mg tablet Comments:   Reason for Stopping:         metoprolol tartrate (LOPRESSOR) 25 mg tablet Comments:   Reason for Stopping:         rivaroxaban (Xarelto) 20 mg tab tablet Comments:   Reason for Stopping:                 Patient Follow Up Instructions:    Activity: PT/OT Eval and Treat  Diet: Dysphagia diet-pureed  Wound Care: None needed        Follow-up Information       Follow up With Specialties Details Why Contact Info None    None ([phone]) Patient stated that they have no PCP      Mark Parnell MD Cardiovascular Disease Physician Follow up in 1 week(s)      [address] [facility] [facility]  [phone]          ________________________________________________________________    Risk of deterioration: Moderate    Condition at Discharge:  Stable  __________________________________________________________________    Disposition  SNF/LTC    ____________________________________________________________________    Code Status: Full Code  ___________________________________________________________________      Total time in minutes spent coordinating this discharge (includes going over instructions, follow-up, prescriptions, and preparing report for sign off to her PCP) :  35 minutes    Signed:  Berkley He MD

## 2023-02-13 NOTE — PROGRESS NOTES
Problem: Falls - Risk of  Goal: *Absence of Falls  Description: Document Shannon Masterstim Fall Risk and appropriate interventions in the flowsheet.   Note: Fall Risk Interventions:       Mentation Interventions: Bed/chair exit alarm, Adequate sleep, hydration, pain control, More frequent rounding    Medication Interventions: Bed/chair exit alarm, Patient to call before getting OOB    Elimination Interventions: Call light in reach, Bed/chair exit alarm

## 2023-02-13 NOTE — PROGRESS NOTES
End of Shift Note     Bedside shift change report given to PAN Coleman (oncoming nurse) by Fernando bautista). Report included the following information SBAR and Kardex     Shift worked: 7p-7a   Shift summary and any significant changes:     -small BM, voided once   -held HS insulin          Concerns for physician to address:  none   Zone phone for oncoming shift:   [phone]      Patient Information  Austin Schultz  80 y.o.  2/3/2023  9:02 AM by Carla Cox MD. Austin Schultz was admitted from SNF Rehab     Problem List          Patient Active Problem List     Diagnosis Date Noted    History of hemorrhagic stroke with residual hemiplegia ([facility] [address] 02/04/2023    Hemiplegia of left nondominant side as late effect of cerebrovascular disease ([address] 02/04/2023    Stroke-like episode 02/03/2023              Past Medical History:   Diagnosis Date    Atrial fibrillation ([address]      CVA (cerebral vascular accident) ([address]      GERD (gastroesophageal reflux disease)      Glaucoma      HTN (hypertension)      Hyperlipidemia           Core Measures:  CVA: Yes Yes  CHF:No No  PNA:No No     Activity:  Activity Level: Bed Rest  Number times ambulated in hallways past shift: 0  Number of times OOB to chair past shift: 0     Cardiac:   Cardiac Monitoring: No      Cardiac Rhythm:      Access:   Current line(s): PIV   Central Line? No Placement date  Reason Medically Necessary   PICC LINE? No Placement date Reason Medically Necessary      Genitourinary:   Urinary status: voiding, bladder scan PRN  Urinary Catheter?  No Placement Date  Reason Medically Necessary      Respiratory:   O2 Device: None (Room air)  Chronic home O2 use?: NO  Incentive spirometer at bedside: NO     GI:  Last Bowel Movement Date: 02/06/23  Current diet:  DIET ONE TIME MESSAGE  ADULT DIET Dysphagia - Pureed  Passing flatus: YES  Tolerating current diet: YES     Pain Management:   Patient states pain is manageable on current regimen: YES Skin:  Mikey Score: 15  Interventions: increase time out of bed, PT/OT consult, and nutritional support     Patient Safety:  Fall Score:    Interventions: bed/chair alarm, assistive device (walker, cane, etc), gripper socks, and pt to call before getting OOB  @Rollbelt  @dexterity to release roll belt  Yes/No ( must document dexterity  here by stating Yes or No here, otherwise this is a restraint and must follow restraint documentation policy.)     DVT prophylaxis:  DVT prophylaxis Med- No  DVT prophylaxis SCD or JOSELO- No      Wounds: (If Applicable)  Wounds- No  Location      Active Consults:  IP CONSULT TO NEUROLOGY  IP CONSULT TO CARDIOLOGY     Length of Stay:  Expected LOS: 2d 21h  Actual LOS: 4  Discharge Plan: Yes Pending placement                                                                                    Revision History

## 2023-02-13 NOTE — PROGRESS NOTES
Physician Progress Note      PATIENT:               Mika De La Vega  CSN #:                  [id number]  :                       1932  ADMIT DATE:       2/3/2023 9:02 AM  DISCH DATE:  RESPONDING  PROVIDER #:        Gertrude Grullon MD          QUERY TEXT:    80yoF pt admitted wit Hemorrhagic transformation of recent stroke and has severe malnutrition documented by RD on 2/10. Please further specify type of malnutrition with documentation in the medical record. The medical record reflects the following:  Risk Factors: advanced age, GERD, poor po intake per flowsheet, recent CVA w/  L sided  hemiplegia 3 wks ago, AMS  Clinical Indicators: UBS was    130#, current wt 133 lb, BMI 20.34 kg/mw  Malnutrition Status:  Severe malnutrition (02/10/23 1111)  Context:  Acute illness  Findings of the 6 clinical characteristics of malnutrition:  Energy Intake:  50% or less of est energy requirements for 5 or more days  Weight Loss:  Unable to assess  Body Fat Loss:  Mild body fat loss, Buccal region, Triceps  Muscle Mass Loss: Moderate muscle mass loss, Clavicles (pectoralis & deltoids), Hand (interosseous), Temples (temporalis)  Fluid Accumulation:  No significant fluid accumulation    Treatment:  Encourage intake of meals, RD recommendation oral nutrition supplement added. ASPEN Criteria:  https://aspenjournals. onlinelibrary. farley. com/doi/full/10.1177/3326469506583124    Thank you,  Kerri Lassiter RN, CDI  Options provided:  -- Moderate Malnutrition  -- Severe Malnutrition  -- Moderate Protein calorie malnutrition  -- Severe Protein calorie malnutrition  -- Other - I will add my own diagnosis  -- Disagree - Not applicable / Not valid  -- Disagree - Clinically unable to determine / Unknown  -- Refer to Clinical Documentation Reviewer    PROVIDER RESPONSE TEXT:    This patient has severe malnutrition.     Query created by: Shiva Vogt on 2023 10:40 AM      Electronically signed by:  Gertrude Grullon MD 2/13/2023 1:15 PM

## 2023-05-23 RX ORDER — LEVOTHYROXINE SODIUM 0.07 MG/1
75 TABLET ORAL
COMMUNITY

## 2023-05-23 RX ORDER — ASPIRIN 81 MG/1
81 TABLET, CHEWABLE ORAL DAILY
COMMUNITY

## 2023-05-23 RX ORDER — AMLODIPINE BESYLATE 5 MG/1
5 TABLET ORAL DAILY
COMMUNITY

## 2023-05-23 RX ORDER — SENNOSIDES 8.6 MG
650 CAPSULE ORAL EVERY 8 HOURS
COMMUNITY

## 2023-05-23 RX ORDER — LATANOPROST 50 UG/ML
1 SOLUTION/ DROPS OPHTHALMIC
COMMUNITY

## 2023-05-23 RX ORDER — TIMOLOL MALEATE 5 MG/ML
1 SOLUTION/ DROPS OPHTHALMIC 2 TIMES DAILY
COMMUNITY

## 2023-05-23 RX ORDER — OMEPRAZOLE 20 MG/1
20 TABLET, DELAYED RELEASE ORAL DAILY
COMMUNITY